# Patient Record
Sex: FEMALE | Race: WHITE | NOT HISPANIC OR LATINO | Employment: UNEMPLOYED | URBAN - METROPOLITAN AREA
[De-identification: names, ages, dates, MRNs, and addresses within clinical notes are randomized per-mention and may not be internally consistent; named-entity substitution may affect disease eponyms.]

---
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
## 2021-03-08 ENCOUNTER — OFFICE VISIT (OUTPATIENT)
Dept: URGENT CARE | Facility: CLINIC | Age: 21
End: 2021-03-08
Payer: MEDICAID

## 2021-03-08 VITALS
HEART RATE: 82 BPM | TEMPERATURE: 97 F | OXYGEN SATURATION: 99 % | RESPIRATION RATE: 16 BRPM | BODY MASS INDEX: 41.11 KG/M2 | DIASTOLIC BLOOD PRESSURE: 82 MMHG | SYSTOLIC BLOOD PRESSURE: 128 MMHG | WEIGHT: 232 LBS | HEIGHT: 63 IN

## 2021-03-08 DIAGNOSIS — M79.671 RIGHT FOOT PAIN: Primary | ICD-10-CM

## 2021-03-08 PROCEDURE — 73630 XR FOOT COMPLETE 3 VIEW RIGHT: ICD-10-PCS | Mod: RT,S$GLB,, | Performed by: RADIOLOGY

## 2021-03-08 PROCEDURE — 99214 PR OFFICE/OUTPT VISIT, EST, LEVL IV, 30-39 MIN: ICD-10-PCS | Mod: S$GLB,,, | Performed by: INTERNAL MEDICINE

## 2021-03-08 PROCEDURE — 73630 X-RAY EXAM OF FOOT: CPT | Mod: RT,S$GLB,, | Performed by: RADIOLOGY

## 2021-03-08 PROCEDURE — 99214 OFFICE O/P EST MOD 30 MIN: CPT | Mod: S$GLB,,, | Performed by: INTERNAL MEDICINE

## 2021-03-08 RX ORDER — DICLOFENAC SODIUM 10 MG/G
2 GEL TOPICAL 3 TIMES DAILY
Qty: 100 G | Refills: 0 | Status: SHIPPED | OUTPATIENT
Start: 2021-03-08 | End: 2021-03-16 | Stop reason: SDUPTHER

## 2021-03-08 RX ORDER — NAPROXEN 500 MG/1
500 TABLET ORAL 2 TIMES DAILY WITH MEALS
Qty: 14 TABLET | Refills: 0 | Status: SHIPPED | OUTPATIENT
Start: 2021-03-08 | End: 2021-03-15

## 2021-03-08 RX ORDER — KETOROLAC TROMETHAMINE 30 MG/ML
30 INJECTION, SOLUTION INTRAMUSCULAR; INTRAVENOUS
Status: COMPLETED | OUTPATIENT
Start: 2021-03-08 | End: 2021-03-08

## 2021-03-08 RX ADMIN — KETOROLAC TROMETHAMINE 30 MG: 30 INJECTION, SOLUTION INTRAMUSCULAR; INTRAVENOUS at 03:03

## 2021-07-03 ENCOUNTER — HOSPITAL ENCOUNTER (OUTPATIENT)
Facility: HOSPITAL | Age: 21
Discharge: HOME | End: 2021-07-04
Attending: EMERGENCY MEDICINE | Admitting: SURGERY
Payer: MEDICAID

## 2021-07-03 DIAGNOSIS — R11.10 VOMITING: ICD-10-CM

## 2021-07-03 DIAGNOSIS — K80.00 CALCULUS OF GALLBLADDER WITH ACUTE CHOLECYSTITIS WITHOUT OBSTRUCTION: Primary | ICD-10-CM

## 2021-07-03 DIAGNOSIS — R10.9 ABDOMINAL PAIN: ICD-10-CM

## 2021-07-03 LAB
ALBUMIN SERPL BCP-MCNC: 4.1 G/DL (ref 3.5–5.2)
ALP SERPL-CCNC: 82 U/L (ref 55–135)
ALT SERPL W/O P-5'-P-CCNC: 34 U/L (ref 10–44)
ANION GAP SERPL CALC-SCNC: 17 MMOL/L (ref 8–16)
AST SERPL-CCNC: 21 U/L (ref 10–40)
B-HCG UR QL: NEGATIVE
BACTERIA #/AREA URNS AUTO: ABNORMAL /HPF
BASOPHILS # BLD AUTO: 0.04 K/UL (ref 0–0.2)
BASOPHILS NFR BLD: 0.3 % (ref 0–1.9)
BILIRUB SERPL-MCNC: 0.4 MG/DL (ref 0.1–1)
BILIRUB UR QL STRIP: NEGATIVE
BUN SERPL-MCNC: 9 MG/DL (ref 6–20)
CALCIUM SERPL-MCNC: 9.6 MG/DL (ref 8.7–10.5)
CHLORIDE SERPL-SCNC: 108 MMOL/L (ref 95–110)
CLARITY UR REFRACT.AUTO: ABNORMAL
CO2 SERPL-SCNC: 15 MMOL/L (ref 23–29)
COLOR UR AUTO: YELLOW
CREAT SERPL-MCNC: 0.8 MG/DL (ref 0.5–1.4)
CTP QC/QA: YES
DIFFERENTIAL METHOD: ABNORMAL
EOSINOPHIL # BLD AUTO: 0.1 K/UL (ref 0–0.5)
EOSINOPHIL NFR BLD: 0.7 % (ref 0–8)
ERYTHROCYTE [DISTWIDTH] IN BLOOD BY AUTOMATED COUNT: 12.2 % (ref 11.5–14.5)
EST. GFR  (AFRICAN AMERICAN): >60 ML/MIN/1.73 M^2
EST. GFR  (NON AFRICAN AMERICAN): >60 ML/MIN/1.73 M^2
GLUCOSE SERPL-MCNC: 146 MG/DL (ref 70–110)
GLUCOSE UR QL STRIP: NEGATIVE
HCT VFR BLD AUTO: 40.2 % (ref 37–48.5)
HGB BLD-MCNC: 13.8 G/DL (ref 12–16)
HGB UR QL STRIP: ABNORMAL
HYALINE CASTS UR QL AUTO: 0 /LPF
IMM GRANULOCYTES # BLD AUTO: 0.09 K/UL (ref 0–0.04)
IMM GRANULOCYTES NFR BLD AUTO: 0.7 % (ref 0–0.5)
KETONES UR QL STRIP: ABNORMAL
LEUKOCYTE ESTERASE UR QL STRIP: ABNORMAL
LIPASE SERPL-CCNC: 13 U/L (ref 4–60)
LYMPHOCYTES # BLD AUTO: 3.7 K/UL (ref 1–4.8)
LYMPHOCYTES NFR BLD: 26.9 % (ref 18–48)
MCH RBC QN AUTO: 29.2 PG (ref 27–31)
MCHC RBC AUTO-ENTMCNC: 34.3 G/DL (ref 32–36)
MCV RBC AUTO: 85 FL (ref 82–98)
MICROSCOPIC COMMENT: ABNORMAL
MONOCYTES # BLD AUTO: 0.5 K/UL (ref 0.3–1)
MONOCYTES NFR BLD: 3.3 % (ref 4–15)
NEUTROPHILS # BLD AUTO: 9.4 K/UL (ref 1.8–7.7)
NEUTROPHILS NFR BLD: 68.1 % (ref 38–73)
NITRITE UR QL STRIP: NEGATIVE
NRBC BLD-RTO: 0 /100 WBC
PH UR STRIP: 6 [PH] (ref 5–8)
PLATELET # BLD AUTO: 283 K/UL (ref 150–450)
PMV BLD AUTO: 10.5 FL (ref 9.2–12.9)
POTASSIUM SERPL-SCNC: 3.6 MMOL/L (ref 3.5–5.1)
PROT SERPL-MCNC: 7.5 G/DL (ref 6–8.4)
PROT UR QL STRIP: ABNORMAL
RBC # BLD AUTO: 4.72 M/UL (ref 4–5.4)
RBC #/AREA URNS AUTO: 2 /HPF (ref 0–4)
SODIUM SERPL-SCNC: 140 MMOL/L (ref 136–145)
SP GR UR STRIP: 1.02 (ref 1–1.03)
SQUAMOUS #/AREA URNS AUTO: 30 /HPF
URN SPEC COLLECT METH UR: ABNORMAL
WBC # BLD AUTO: 13.71 K/UL (ref 3.9–12.7)
WBC #/AREA URNS AUTO: 6 /HPF (ref 0–5)

## 2021-07-03 PROCEDURE — 80053 COMPREHEN METABOLIC PANEL: CPT | Performed by: PHYSICIAN ASSISTANT

## 2021-07-03 PROCEDURE — 25000003 PHARM REV CODE 250: Performed by: PHYSICIAN ASSISTANT

## 2021-07-03 PROCEDURE — 93010 EKG 12-LEAD: ICD-10-PCS | Mod: ,,, | Performed by: INTERNAL MEDICINE

## 2021-07-03 PROCEDURE — 96361 HYDRATE IV INFUSION ADD-ON: CPT

## 2021-07-03 PROCEDURE — 81025 URINE PREGNANCY TEST: CPT | Performed by: PHYSICIAN ASSISTANT

## 2021-07-03 PROCEDURE — 96376 TX/PRO/DX INJ SAME DRUG ADON: CPT

## 2021-07-03 PROCEDURE — 96375 TX/PRO/DX INJ NEW DRUG ADDON: CPT

## 2021-07-03 PROCEDURE — 85025 COMPLETE CBC W/AUTO DIFF WBC: CPT | Performed by: PHYSICIAN ASSISTANT

## 2021-07-03 PROCEDURE — 87389 HIV-1 AG W/HIV-1&-2 AB AG IA: CPT | Performed by: PHYSICIAN ASSISTANT

## 2021-07-03 PROCEDURE — 86803 HEPATITIS C AB TEST: CPT | Performed by: PHYSICIAN ASSISTANT

## 2021-07-03 PROCEDURE — 96372 THER/PROPH/DIAG INJ SC/IM: CPT | Mod: 59

## 2021-07-03 PROCEDURE — 83690 ASSAY OF LIPASE: CPT | Performed by: PHYSICIAN ASSISTANT

## 2021-07-03 PROCEDURE — 99285 EMERGENCY DEPT VISIT HI MDM: CPT | Mod: 25

## 2021-07-03 PROCEDURE — 81001 URINALYSIS AUTO W/SCOPE: CPT | Performed by: PHYSICIAN ASSISTANT

## 2021-07-03 PROCEDURE — 99285 PR EMERGENCY DEPT VISIT,LEVEL V: ICD-10-PCS | Mod: ,,, | Performed by: EMERGENCY MEDICINE

## 2021-07-03 PROCEDURE — 93010 ELECTROCARDIOGRAM REPORT: CPT | Mod: ,,, | Performed by: INTERNAL MEDICINE

## 2021-07-03 PROCEDURE — 25500020 PHARM REV CODE 255: Performed by: PHYSICIAN ASSISTANT

## 2021-07-03 PROCEDURE — 63600175 PHARM REV CODE 636 W HCPCS: Performed by: PHYSICIAN ASSISTANT

## 2021-07-03 PROCEDURE — 99285 EMERGENCY DEPT VISIT HI MDM: CPT | Mod: ,,, | Performed by: EMERGENCY MEDICINE

## 2021-07-03 PROCEDURE — 93005 ELECTROCARDIOGRAM TRACING: CPT

## 2021-07-03 PROCEDURE — 96374 THER/PROPH/DIAG INJ IV PUSH: CPT | Mod: 59

## 2021-07-03 RX ORDER — DROPERIDOL 2.5 MG/ML
0.62 INJECTION, SOLUTION INTRAMUSCULAR; INTRAVENOUS
Status: COMPLETED | OUTPATIENT
Start: 2021-07-03 | End: 2021-07-03

## 2021-07-03 RX ORDER — METOCLOPRAMIDE HYDROCHLORIDE 5 MG/ML
10 INJECTION INTRAMUSCULAR; INTRAVENOUS
Status: COMPLETED | OUTPATIENT
Start: 2021-07-03 | End: 2021-07-03

## 2021-07-03 RX ORDER — DROPERIDOL 2.5 MG/ML
1.25 INJECTION, SOLUTION INTRAMUSCULAR; INTRAVENOUS
Status: DISCONTINUED | OUTPATIENT
Start: 2021-07-03 | End: 2021-07-03

## 2021-07-03 RX ORDER — DICYCLOMINE HYDROCHLORIDE 10 MG/ML
20 INJECTION INTRAMUSCULAR
Status: COMPLETED | OUTPATIENT
Start: 2021-07-03 | End: 2021-07-03

## 2021-07-03 RX ORDER — LORAZEPAM 2 MG/ML
1 INJECTION INTRAMUSCULAR
Status: COMPLETED | OUTPATIENT
Start: 2021-07-03 | End: 2021-07-03

## 2021-07-03 RX ORDER — KETOROLAC TROMETHAMINE 30 MG/ML
15 INJECTION, SOLUTION INTRAMUSCULAR; INTRAVENOUS
Status: COMPLETED | OUTPATIENT
Start: 2021-07-03 | End: 2021-07-03

## 2021-07-03 RX ADMIN — SODIUM CHLORIDE 1000 ML: 0.9 INJECTION, SOLUTION INTRAVENOUS at 01:07

## 2021-07-03 RX ADMIN — KETOROLAC TROMETHAMINE 15 MG: 30 INJECTION, SOLUTION INTRAMUSCULAR; INTRAVENOUS at 04:07

## 2021-07-03 RX ADMIN — DROPERIDOL 0.62 MG: 2.5 INJECTION, SOLUTION INTRAMUSCULAR; INTRAVENOUS at 06:07

## 2021-07-03 RX ADMIN — DROPERIDOL 0.62 MG: 2.5 INJECTION, SOLUTION INTRAMUSCULAR; INTRAVENOUS at 01:07

## 2021-07-03 RX ADMIN — LORAZEPAM 1 MG: 2 INJECTION INTRAMUSCULAR; INTRAVENOUS at 02:07

## 2021-07-03 RX ADMIN — METOCLOPRAMIDE 10 MG: 5 INJECTION, SOLUTION INTRAMUSCULAR; INTRAVENOUS at 09:07

## 2021-07-03 RX ADMIN — SODIUM CHLORIDE 1000 ML: 0.9 INJECTION, SOLUTION INTRAVENOUS at 05:07

## 2021-07-03 RX ADMIN — IOHEXOL 100 ML: 350 INJECTION, SOLUTION INTRAVENOUS at 08:07

## 2021-07-03 RX ADMIN — DICYCLOMINE HYDROCHLORIDE 20 MG: 20 INJECTION, SOLUTION INTRAMUSCULAR at 09:07

## 2021-07-04 ENCOUNTER — ANESTHESIA (OUTPATIENT)
Dept: SURGERY | Facility: HOSPITAL | Age: 21
End: 2021-07-04
Payer: MEDICAID

## 2021-07-04 ENCOUNTER — ANESTHESIA EVENT (OUTPATIENT)
Dept: SURGERY | Facility: HOSPITAL | Age: 21
End: 2021-07-04
Payer: MEDICAID

## 2021-07-04 VITALS
DIASTOLIC BLOOD PRESSURE: 75 MMHG | BODY MASS INDEX: 38.09 KG/M2 | TEMPERATURE: 98 F | SYSTOLIC BLOOD PRESSURE: 130 MMHG | HEIGHT: 63 IN | WEIGHT: 215 LBS | OXYGEN SATURATION: 95 % | HEART RATE: 68 BPM | RESPIRATION RATE: 20 BRPM

## 2021-07-04 PROBLEM — R11.10 VOMITING: Status: ACTIVE | Noted: 2021-07-04

## 2021-07-04 PROBLEM — K81.0 ACUTE CHOLECYSTITIS: Status: ACTIVE | Noted: 2021-07-04

## 2021-07-04 PROBLEM — K80.00 CHOLELITHIASIS WITH ACUTE CHOLECYSTITIS: Status: ACTIVE | Noted: 2021-07-04

## 2021-07-04 LAB
ALBUMIN SERPL BCP-MCNC: 4.1 G/DL (ref 3.5–5.2)
ALP SERPL-CCNC: 77 U/L (ref 55–135)
ALT SERPL W/O P-5'-P-CCNC: 28 U/L (ref 10–44)
ANION GAP SERPL CALC-SCNC: 11 MMOL/L (ref 8–16)
AST SERPL-CCNC: 18 U/L (ref 10–40)
BASOPHILS # BLD AUTO: 0.03 K/UL (ref 0–0.2)
BASOPHILS NFR BLD: 0.2 % (ref 0–1.9)
BILIRUB SERPL-MCNC: 0.5 MG/DL (ref 0.1–1)
BUN SERPL-MCNC: 8 MG/DL (ref 6–20)
CALCIUM SERPL-MCNC: 9.4 MG/DL (ref 8.7–10.5)
CHLORIDE SERPL-SCNC: 106 MMOL/L (ref 95–110)
CO2 SERPL-SCNC: 23 MMOL/L (ref 23–29)
CREAT SERPL-MCNC: 0.6 MG/DL (ref 0.5–1.4)
CTP QC/QA: YES
DIFFERENTIAL METHOD: ABNORMAL
EOSINOPHIL # BLD AUTO: 0 K/UL (ref 0–0.5)
EOSINOPHIL NFR BLD: 0.2 % (ref 0–8)
ERYTHROCYTE [DISTWIDTH] IN BLOOD BY AUTOMATED COUNT: 12.4 % (ref 11.5–14.5)
EST. GFR  (AFRICAN AMERICAN): >60 ML/MIN/1.73 M^2
EST. GFR  (NON AFRICAN AMERICAN): >60 ML/MIN/1.73 M^2
GLUCOSE SERPL-MCNC: 91 MG/DL (ref 70–110)
HCT VFR BLD AUTO: 40.9 % (ref 37–48.5)
HGB BLD-MCNC: 13.2 G/DL (ref 12–16)
IMM GRANULOCYTES # BLD AUTO: 0.06 K/UL (ref 0–0.04)
IMM GRANULOCYTES NFR BLD AUTO: 0.5 % (ref 0–0.5)
LYMPHOCYTES # BLD AUTO: 2.9 K/UL (ref 1–4.8)
LYMPHOCYTES NFR BLD: 24.2 % (ref 18–48)
MAGNESIUM SERPL-MCNC: 2 MG/DL (ref 1.6–2.6)
MCH RBC QN AUTO: 28.4 PG (ref 27–31)
MCHC RBC AUTO-ENTMCNC: 32.3 G/DL (ref 32–36)
MCV RBC AUTO: 88 FL (ref 82–98)
MONOCYTES # BLD AUTO: 0.6 K/UL (ref 0.3–1)
MONOCYTES NFR BLD: 4.7 % (ref 4–15)
NEUTROPHILS # BLD AUTO: 8.5 K/UL (ref 1.8–7.7)
NEUTROPHILS NFR BLD: 70.2 % (ref 38–73)
NRBC BLD-RTO: 0 /100 WBC
PHOSPHATE SERPL-MCNC: 2.3 MG/DL (ref 2.7–4.5)
PLATELET # BLD AUTO: 264 K/UL (ref 150–450)
PMV BLD AUTO: 10.6 FL (ref 9.2–12.9)
POTASSIUM SERPL-SCNC: 3.4 MMOL/L (ref 3.5–5.1)
PROT SERPL-MCNC: 7.6 G/DL (ref 6–8.4)
RBC # BLD AUTO: 4.65 M/UL (ref 4–5.4)
SARS-COV-2 RDRP RESP QL NAA+PROBE: NEGATIVE
SODIUM SERPL-SCNC: 140 MMOL/L (ref 136–145)
WBC # BLD AUTO: 12.13 K/UL (ref 3.9–12.7)

## 2021-07-04 PROCEDURE — 83735 ASSAY OF MAGNESIUM: CPT | Performed by: STUDENT IN AN ORGANIZED HEALTH CARE EDUCATION/TRAINING PROGRAM

## 2021-07-04 PROCEDURE — 88304 TISSUE EXAM BY PATHOLOGIST: CPT | Performed by: PATHOLOGY

## 2021-07-04 PROCEDURE — 25000003 PHARM REV CODE 250: Performed by: STUDENT IN AN ORGANIZED HEALTH CARE EDUCATION/TRAINING PROGRAM

## 2021-07-04 PROCEDURE — 36000708 HC OR TIME LEV III 1ST 15 MIN: Performed by: SURGERY

## 2021-07-04 PROCEDURE — 63600175 PHARM REV CODE 636 W HCPCS: Performed by: STUDENT IN AN ORGANIZED HEALTH CARE EDUCATION/TRAINING PROGRAM

## 2021-07-04 PROCEDURE — 25000003 PHARM REV CODE 250: Performed by: NURSE ANESTHETIST, CERTIFIED REGISTERED

## 2021-07-04 PROCEDURE — 63600175 PHARM REV CODE 636 W HCPCS: Performed by: ANESTHESIOLOGY

## 2021-07-04 PROCEDURE — 36000709 HC OR TIME LEV III EA ADD 15 MIN: Performed by: SURGERY

## 2021-07-04 PROCEDURE — 00790 ANES IPER UPR ABD NOS: CPT | Performed by: SURGERY

## 2021-07-04 PROCEDURE — 47562 PR LAP,CHOLECYSTECTOMY: ICD-10-PCS | Mod: ,,, | Performed by: SURGERY

## 2021-07-04 PROCEDURE — 96376 TX/PRO/DX INJ SAME DRUG ADON: CPT

## 2021-07-04 PROCEDURE — 37000008 HC ANESTHESIA 1ST 15 MINUTES: Performed by: SURGERY

## 2021-07-04 PROCEDURE — D9220A PRA ANESTHESIA: Mod: CRNA,,, | Performed by: NURSE ANESTHETIST, CERTIFIED REGISTERED

## 2021-07-04 PROCEDURE — 99219 PR INITIAL OBSERVATION CARE,LEVL II: ICD-10-PCS | Mod: 57,,, | Performed by: SURGERY

## 2021-07-04 PROCEDURE — 88304 TISSUE EXAM BY PATHOLOGIST: CPT | Mod: 26,,, | Performed by: PATHOLOGY

## 2021-07-04 PROCEDURE — 85025 COMPLETE CBC W/AUTO DIFF WBC: CPT | Performed by: STUDENT IN AN ORGANIZED HEALTH CARE EDUCATION/TRAINING PROGRAM

## 2021-07-04 PROCEDURE — 25000003 PHARM REV CODE 250: Performed by: SURGERY

## 2021-07-04 PROCEDURE — 47562 LAPAROSCOPIC CHOLECYSTECTOMY: CPT | Mod: ,,, | Performed by: SURGERY

## 2021-07-04 PROCEDURE — D9220A PRA ANESTHESIA: ICD-10-PCS | Mod: ANES,,, | Performed by: ANESTHESIOLOGY

## 2021-07-04 PROCEDURE — G0378 HOSPITAL OBSERVATION PER HR: HCPCS

## 2021-07-04 PROCEDURE — 96375 TX/PRO/DX INJ NEW DRUG ADDON: CPT

## 2021-07-04 PROCEDURE — 80053 COMPREHEN METABOLIC PANEL: CPT | Performed by: STUDENT IN AN ORGANIZED HEALTH CARE EDUCATION/TRAINING PROGRAM

## 2021-07-04 PROCEDURE — 36415 COLL VENOUS BLD VENIPUNCTURE: CPT | Performed by: STUDENT IN AN ORGANIZED HEALTH CARE EDUCATION/TRAINING PROGRAM

## 2021-07-04 PROCEDURE — U0002 COVID-19 LAB TEST NON-CDC: HCPCS | Performed by: PHYSICIAN ASSISTANT

## 2021-07-04 PROCEDURE — 96375 TX/PRO/DX INJ NEW DRUG ADDON: CPT | Mod: 59

## 2021-07-04 PROCEDURE — 88304 PR  SURG PATH,LEVEL III: ICD-10-PCS | Mod: 26,,, | Performed by: PATHOLOGY

## 2021-07-04 PROCEDURE — 27201423 OPTIME MED/SURG SUP & DEVICES STERILE SUPPLY: Performed by: SURGERY

## 2021-07-04 PROCEDURE — 99219 PR INITIAL OBSERVATION CARE,LEVL II: CPT | Mod: 57,,, | Performed by: SURGERY

## 2021-07-04 PROCEDURE — 71000033 HC RECOVERY, INTIAL HOUR: Performed by: SURGERY

## 2021-07-04 PROCEDURE — D9220A PRA ANESTHESIA: ICD-10-PCS | Mod: CRNA,,, | Performed by: NURSE ANESTHETIST, CERTIFIED REGISTERED

## 2021-07-04 PROCEDURE — 37000009 HC ANESTHESIA EA ADD 15 MINS: Performed by: SURGERY

## 2021-07-04 PROCEDURE — 63600175 PHARM REV CODE 636 W HCPCS: Performed by: NURSE ANESTHETIST, CERTIFIED REGISTERED

## 2021-07-04 PROCEDURE — D9220A PRA ANESTHESIA: Mod: ANES,,, | Performed by: ANESTHESIOLOGY

## 2021-07-04 PROCEDURE — 63600175 PHARM REV CODE 636 W HCPCS: Performed by: PHYSICIAN ASSISTANT

## 2021-07-04 PROCEDURE — 25000242 PHARM REV CODE 250 ALT 637 W/ HCPCS: Performed by: NURSE ANESTHETIST, CERTIFIED REGISTERED

## 2021-07-04 PROCEDURE — 84100 ASSAY OF PHOSPHORUS: CPT | Performed by: STUDENT IN AN ORGANIZED HEALTH CARE EDUCATION/TRAINING PROGRAM

## 2021-07-04 RX ORDER — ALBUTEROL SULFATE 90 UG/1
AEROSOL, METERED RESPIRATORY (INHALATION)
Status: DISCONTINUED | OUTPATIENT
Start: 2021-07-04 | End: 2021-07-04

## 2021-07-04 RX ORDER — BUPIVACAINE HYDROCHLORIDE 5 MG/ML
INJECTION, SOLUTION EPIDURAL; INTRACAUDAL
Status: DISCONTINUED | OUTPATIENT
Start: 2021-07-04 | End: 2021-07-04 | Stop reason: HOSPADM

## 2021-07-04 RX ORDER — MIDAZOLAM HYDROCHLORIDE 1 MG/ML
INJECTION, SOLUTION INTRAMUSCULAR; INTRAVENOUS
Status: DISCONTINUED | OUTPATIENT
Start: 2021-07-04 | End: 2021-07-04

## 2021-07-04 RX ORDER — LIDOCAINE HYDROCHLORIDE 20 MG/ML
INJECTION, SOLUTION EPIDURAL; INFILTRATION; INTRACAUDAL; PERINEURAL
Status: DISCONTINUED | OUTPATIENT
Start: 2021-07-04 | End: 2021-07-04

## 2021-07-04 RX ORDER — ROCURONIUM BROMIDE 10 MG/ML
INJECTION, SOLUTION INTRAVENOUS
Status: DISCONTINUED | OUTPATIENT
Start: 2021-07-04 | End: 2021-07-04

## 2021-07-04 RX ORDER — DEXMEDETOMIDINE HYDROCHLORIDE 100 UG/ML
INJECTION, SOLUTION INTRAVENOUS
Status: DISCONTINUED | OUTPATIENT
Start: 2021-07-04 | End: 2021-07-04

## 2021-07-04 RX ORDER — ONDANSETRON 2 MG/ML
INJECTION INTRAMUSCULAR; INTRAVENOUS
Status: DISCONTINUED | OUTPATIENT
Start: 2021-07-04 | End: 2021-07-04

## 2021-07-04 RX ORDER — SODIUM CHLORIDE 0.9 % (FLUSH) 0.9 %
10 SYRINGE (ML) INJECTION
Status: DISCONTINUED | OUTPATIENT
Start: 2021-07-04 | End: 2021-07-04 | Stop reason: HOSPADM

## 2021-07-04 RX ORDER — ONDANSETRON 4 MG/1
4 TABLET, FILM COATED ORAL EVERY 8 HOURS PRN
Qty: 10 TABLET | Refills: 0 | Status: SHIPPED | OUTPATIENT
Start: 2021-07-04 | End: 2021-08-19 | Stop reason: SDUPTHER

## 2021-07-04 RX ORDER — CEFAZOLIN SODIUM 1 G/3ML
INJECTION, POWDER, FOR SOLUTION INTRAMUSCULAR; INTRAVENOUS
Status: DISCONTINUED | OUTPATIENT
Start: 2021-07-04 | End: 2021-07-04

## 2021-07-04 RX ORDER — ONDANSETRON 8 MG/1
8 TABLET, ORALLY DISINTEGRATING ORAL EVERY 8 HOURS PRN
Status: DISCONTINUED | OUTPATIENT
Start: 2021-07-04 | End: 2021-07-04 | Stop reason: HOSPADM

## 2021-07-04 RX ORDER — PROPOFOL 10 MG/ML
VIAL (ML) INTRAVENOUS
Status: DISCONTINUED | OUTPATIENT
Start: 2021-07-04 | End: 2021-07-04

## 2021-07-04 RX ORDER — ACETAMINOPHEN 10 MG/ML
INJECTION, SOLUTION INTRAVENOUS
Status: DISCONTINUED | OUTPATIENT
Start: 2021-07-04 | End: 2021-07-04

## 2021-07-04 RX ORDER — MORPHINE SULFATE 4 MG/ML
4 INJECTION, SOLUTION INTRAMUSCULAR; INTRAVENOUS
Status: COMPLETED | OUTPATIENT
Start: 2021-07-04 | End: 2021-07-04

## 2021-07-04 RX ORDER — ENOXAPARIN SODIUM 100 MG/ML
40 INJECTION SUBCUTANEOUS EVERY 24 HOURS
Status: DISCONTINUED | OUTPATIENT
Start: 2021-07-04 | End: 2021-07-04 | Stop reason: HOSPADM

## 2021-07-04 RX ORDER — HYDROMORPHONE HYDROCHLORIDE 1 MG/ML
0.2 INJECTION, SOLUTION INTRAMUSCULAR; INTRAVENOUS; SUBCUTANEOUS EVERY 5 MIN PRN
Status: DISCONTINUED | OUTPATIENT
Start: 2021-07-04 | End: 2021-07-04 | Stop reason: HOSPADM

## 2021-07-04 RX ORDER — SODIUM CHLORIDE 0.9 % (FLUSH) 0.9 %
3 SYRINGE (ML) INJECTION
Status: DISCONTINUED | OUTPATIENT
Start: 2021-07-04 | End: 2021-07-04 | Stop reason: HOSPADM

## 2021-07-04 RX ORDER — ONDANSETRON 2 MG/ML
4 INJECTION INTRAMUSCULAR; INTRAVENOUS ONCE AS NEEDED
Status: DISCONTINUED | OUTPATIENT
Start: 2021-07-04 | End: 2021-07-04 | Stop reason: HOSPADM

## 2021-07-04 RX ORDER — OXYCODONE AND ACETAMINOPHEN 5; 325 MG/1; MG/1
1 TABLET ORAL EVERY 4 HOURS PRN
Status: DISCONTINUED | OUTPATIENT
Start: 2021-07-04 | End: 2021-07-04 | Stop reason: HOSPADM

## 2021-07-04 RX ORDER — FENTANYL CITRATE 50 UG/ML
25 INJECTION, SOLUTION INTRAMUSCULAR; INTRAVENOUS EVERY 5 MIN PRN
Status: COMPLETED | OUTPATIENT
Start: 2021-07-04 | End: 2021-07-04

## 2021-07-04 RX ORDER — OXYCODONE AND ACETAMINOPHEN 5; 325 MG/1; MG/1
1 TABLET ORAL EVERY 4 HOURS PRN
Qty: 25 TABLET | Refills: 0 | Status: SHIPPED | OUTPATIENT
Start: 2021-07-04 | End: 2021-07-19

## 2021-07-04 RX ORDER — HALOPERIDOL 5 MG/ML
0.5 INJECTION INTRAMUSCULAR EVERY 10 MIN PRN
Status: DISCONTINUED | OUTPATIENT
Start: 2021-07-04 | End: 2021-07-04 | Stop reason: HOSPADM

## 2021-07-04 RX ORDER — OXYCODONE AND ACETAMINOPHEN 10; 325 MG/1; MG/1
1 TABLET ORAL EVERY 4 HOURS PRN
Status: DISCONTINUED | OUTPATIENT
Start: 2021-07-04 | End: 2021-07-04 | Stop reason: HOSPADM

## 2021-07-04 RX ORDER — DEXAMETHASONE SODIUM PHOSPHATE 4 MG/ML
INJECTION, SOLUTION INTRA-ARTICULAR; INTRALESIONAL; INTRAMUSCULAR; INTRAVENOUS; SOFT TISSUE
Status: DISCONTINUED | OUTPATIENT
Start: 2021-07-04 | End: 2021-07-04

## 2021-07-04 RX ORDER — LIDOCAINE HYDROCHLORIDE 10 MG/ML
1 INJECTION, SOLUTION EPIDURAL; INFILTRATION; INTRACAUDAL; PERINEURAL ONCE
Status: DISCONTINUED | OUTPATIENT
Start: 2021-07-04 | End: 2021-07-04 | Stop reason: HOSPADM

## 2021-07-04 RX ORDER — HYDROMORPHONE HYDROCHLORIDE 1 MG/ML
0.5 INJECTION, SOLUTION INTRAMUSCULAR; INTRAVENOUS; SUBCUTANEOUS EVERY 4 HOURS PRN
Status: DISCONTINUED | OUTPATIENT
Start: 2021-07-04 | End: 2021-07-04

## 2021-07-04 RX ORDER — TALC
6 POWDER (GRAM) TOPICAL NIGHTLY PRN
Status: DISCONTINUED | OUTPATIENT
Start: 2021-07-04 | End: 2021-07-04

## 2021-07-04 RX ORDER — ACETAMINOPHEN 325 MG/1
650 TABLET ORAL EVERY 8 HOURS PRN
Status: DISCONTINUED | OUTPATIENT
Start: 2021-07-04 | End: 2021-07-04 | Stop reason: HOSPADM

## 2021-07-04 RX ORDER — FENTANYL CITRATE 50 UG/ML
INJECTION, SOLUTION INTRAMUSCULAR; INTRAVENOUS
Status: DISCONTINUED | OUTPATIENT
Start: 2021-07-04 | End: 2021-07-04

## 2021-07-04 RX ORDER — OXYCODONE AND ACETAMINOPHEN 5; 325 MG/1; MG/1
1 TABLET ORAL EVERY 4 HOURS PRN
Qty: 25 TABLET | Refills: 0 | Status: SHIPPED | OUTPATIENT
Start: 2021-07-04 | End: 2021-07-04 | Stop reason: SDUPTHER

## 2021-07-04 RX ORDER — SODIUM CHLORIDE, SODIUM LACTATE, POTASSIUM CHLORIDE, CALCIUM CHLORIDE 600; 310; 30; 20 MG/100ML; MG/100ML; MG/100ML; MG/100ML
INJECTION, SOLUTION INTRAVENOUS CONTINUOUS
Status: DISCONTINUED | OUTPATIENT
Start: 2021-07-04 | End: 2021-07-04

## 2021-07-04 RX ORDER — DIPHENHYDRAMINE HYDROCHLORIDE 50 MG/ML
25 INJECTION INTRAMUSCULAR; INTRAVENOUS EVERY 6 HOURS PRN
Status: DISCONTINUED | OUTPATIENT
Start: 2021-07-04 | End: 2021-07-04 | Stop reason: HOSPADM

## 2021-07-04 RX ADMIN — DEXMEDETOMIDINE HYDROCHLORIDE 8 MCG: 100 INJECTION, SOLUTION INTRAVENOUS at 12:07

## 2021-07-04 RX ADMIN — GLYCOPYRROLATE 0.1 MG: 0.2 INJECTION, SOLUTION INTRAMUSCULAR; INTRAVITREAL at 12:07

## 2021-07-04 RX ADMIN — SUGAMMADEX 200 MG: 100 INJECTION, SOLUTION INTRAVENOUS at 12:07

## 2021-07-04 RX ADMIN — MORPHINE SULFATE 4 MG: 4 INJECTION INTRAVENOUS at 12:07

## 2021-07-04 RX ADMIN — ONDANSETRON 4 MG: 2 INJECTION INTRAMUSCULAR; INTRAVENOUS at 12:07

## 2021-07-04 RX ADMIN — FENTANYL CITRATE 25 MCG: 50 INJECTION, SOLUTION INTRAMUSCULAR; INTRAVENOUS at 01:07

## 2021-07-04 RX ADMIN — OXYCODONE AND ACETAMINOPHEN 1 TABLET: 10; 325 TABLET ORAL at 12:07

## 2021-07-04 RX ADMIN — LIDOCAINE HYDROCHLORIDE 100 MG: 20 INJECTION, SOLUTION EPIDURAL; INFILTRATION; INTRACAUDAL at 11:07

## 2021-07-04 RX ADMIN — ROCURONIUM BROMIDE 40 MG: 10 INJECTION INTRAVENOUS at 11:07

## 2021-07-04 RX ADMIN — HYDROMORPHONE HYDROCHLORIDE 0.5 MG: 1 INJECTION, SOLUTION INTRAMUSCULAR; INTRAVENOUS; SUBCUTANEOUS at 09:07

## 2021-07-04 RX ADMIN — ONDANSETRON 8 MG: 8 TABLET, ORALLY DISINTEGRATING ORAL at 05:07

## 2021-07-04 RX ADMIN — FENTANYL CITRATE 25 MCG: 50 INJECTION, SOLUTION INTRAMUSCULAR; INTRAVENOUS at 12:07

## 2021-07-04 RX ADMIN — MIDAZOLAM 2 MG: 1 INJECTION INTRAMUSCULAR; INTRAVENOUS at 11:07

## 2021-07-04 RX ADMIN — PROPOFOL 200 MG: 10 INJECTION, EMULSION INTRAVENOUS at 11:07

## 2021-07-04 RX ADMIN — FENTANYL CITRATE 25 MCG: 50 INJECTION INTRAMUSCULAR; INTRAVENOUS at 12:07

## 2021-07-04 RX ADMIN — FENTANYL CITRATE 50 MCG: 50 INJECTION INTRAMUSCULAR; INTRAVENOUS at 11:07

## 2021-07-04 RX ADMIN — OXYCODONE AND ACETAMINOPHEN 1 TABLET: 10; 325 TABLET ORAL at 05:07

## 2021-07-04 RX ADMIN — ALBUTEROL SULFATE 4 PUFF: 108 INHALANT RESPIRATORY (INHALATION) at 12:07

## 2021-07-04 RX ADMIN — HYDROMORPHONE HYDROCHLORIDE 0.5 MG: 1 INJECTION, SOLUTION INTRAMUSCULAR; INTRAVENOUS; SUBCUTANEOUS at 05:07

## 2021-07-04 RX ADMIN — SODIUM CHLORIDE: 9 INJECTION, SOLUTION INTRAVENOUS at 11:07

## 2021-07-04 RX ADMIN — DEXAMETHASONE SODIUM PHOSPHATE 4 MG: 4 INJECTION INTRA-ARTICULAR; INTRALESIONAL; INTRAMUSCULAR; INTRAVENOUS; SOFT TISSUE at 12:07

## 2021-07-04 RX ADMIN — SODIUM CHLORIDE, SODIUM LACTATE, POTASSIUM CHLORIDE, AND CALCIUM CHLORIDE: .6; .31; .03; .02 INJECTION, SOLUTION INTRAVENOUS at 05:07

## 2021-07-04 RX ADMIN — CEFAZOLIN 2 G: 330 INJECTION, POWDER, FOR SOLUTION INTRAMUSCULAR; INTRAVENOUS at 12:07

## 2021-07-04 RX ADMIN — ACETAMINOPHEN 1000 MG: 10 INJECTION INTRAVENOUS at 12:07

## 2021-07-05 ENCOUNTER — NURSE TRIAGE (OUTPATIENT)
Dept: ADMINISTRATIVE | Facility: CLINIC | Age: 21
End: 2021-07-05

## 2021-07-05 LAB
HCV AB SERPL QL IA: NEGATIVE
HIV 1+2 AB+HIV1 P24 AG SERPL QL IA: NEGATIVE

## 2021-07-09 ENCOUNTER — TELEPHONE (OUTPATIENT)
Dept: SURGERY | Facility: CLINIC | Age: 21
End: 2021-07-09

## 2021-07-12 LAB
FINAL PATHOLOGIC DIAGNOSIS: NORMAL
Lab: NORMAL

## 2021-07-19 ENCOUNTER — OFFICE VISIT (OUTPATIENT)
Dept: SURGERY | Facility: CLINIC | Age: 21
End: 2021-07-19
Payer: MEDICAID

## 2021-07-19 VITALS
TEMPERATURE: 99 F | OXYGEN SATURATION: 99 % | WEIGHT: 221.13 LBS | BODY MASS INDEX: 39.18 KG/M2 | SYSTOLIC BLOOD PRESSURE: 132 MMHG | DIASTOLIC BLOOD PRESSURE: 78 MMHG | HEIGHT: 63 IN | HEART RATE: 115 BPM

## 2021-07-19 DIAGNOSIS — K80.00 CALCULUS OF GALLBLADDER WITH ACUTE CHOLECYSTITIS WITHOUT OBSTRUCTION: Primary | ICD-10-CM

## 2021-07-19 PROCEDURE — 99999 PR PBB SHADOW E&M-EST. PATIENT-LVL IV: ICD-10-PCS | Mod: PBBFAC,,, | Performed by: SURGERY

## 2021-07-19 PROCEDURE — 99024 PR POST-OP FOLLOW-UP VISIT: ICD-10-PCS | Mod: ,,, | Performed by: SURGERY

## 2021-07-19 PROCEDURE — 99999 PR PBB SHADOW E&M-EST. PATIENT-LVL IV: CPT | Mod: PBBFAC,,, | Performed by: SURGERY

## 2021-07-19 PROCEDURE — 99214 OFFICE O/P EST MOD 30 MIN: CPT | Mod: PBBFAC | Performed by: SURGERY

## 2021-07-19 PROCEDURE — 99024 POSTOP FOLLOW-UP VISIT: CPT | Mod: ,,, | Performed by: SURGERY

## 2021-07-19 RX ORDER — OXYCODONE AND ACETAMINOPHEN 5; 325 MG/1; MG/1
1 TABLET ORAL EVERY 4 HOURS PRN
Qty: 10 TABLET | Refills: 0 | Status: SHIPPED | OUTPATIENT
Start: 2021-07-19 | End: 2022-09-06

## 2021-07-26 ENCOUNTER — OFFICE VISIT (OUTPATIENT)
Dept: SURGERY | Facility: CLINIC | Age: 21
End: 2021-07-26
Payer: MEDICAID

## 2021-07-26 ENCOUNTER — TELEPHONE (OUTPATIENT)
Dept: SURGERY | Facility: CLINIC | Age: 21
End: 2021-07-26

## 2021-07-26 VITALS
HEIGHT: 63 IN | DIASTOLIC BLOOD PRESSURE: 86 MMHG | BODY MASS INDEX: 39.02 KG/M2 | OXYGEN SATURATION: 96 % | SYSTOLIC BLOOD PRESSURE: 125 MMHG | HEART RATE: 115 BPM | TEMPERATURE: 99 F | WEIGHT: 220.25 LBS

## 2021-07-26 DIAGNOSIS — K80.01 CALCULUS OF GALLBLADDER WITH ACUTE CHOLECYSTITIS AND OBSTRUCTION: Primary | ICD-10-CM

## 2021-07-26 PROCEDURE — 99214 OFFICE O/P EST MOD 30 MIN: CPT | Mod: PBBFAC | Performed by: SURGERY

## 2021-07-26 PROCEDURE — 99999 PR PBB SHADOW E&M-EST. PATIENT-LVL IV: ICD-10-PCS | Mod: PBBFAC,,, | Performed by: SURGERY

## 2021-07-26 PROCEDURE — 99999 PR PBB SHADOW E&M-EST. PATIENT-LVL IV: CPT | Mod: PBBFAC,,, | Performed by: SURGERY

## 2021-07-26 PROCEDURE — 99024 POSTOP FOLLOW-UP VISIT: CPT | Mod: ,,, | Performed by: SURGERY

## 2021-07-26 PROCEDURE — 99024 PR POST-OP FOLLOW-UP VISIT: ICD-10-PCS | Mod: ,,, | Performed by: SURGERY

## 2021-07-26 RX ORDER — CYCLOBENZAPRINE HCL 5 MG
5 TABLET ORAL 3 TIMES DAILY PRN
Qty: 30 TABLET | Refills: 0 | Status: SHIPPED | OUTPATIENT
Start: 2021-07-26 | End: 2021-08-19 | Stop reason: SDUPTHER

## 2022-04-29 ENCOUNTER — HOSPITAL ENCOUNTER (EMERGENCY)
Facility: HOSPITAL | Age: 22
Discharge: HOME | End: 2022-04-29
Attending: EMERGENCY MEDICINE
Payer: MEDICAID

## 2022-04-29 VITALS
TEMPERATURE: 98 F | HEIGHT: 63 IN | OXYGEN SATURATION: 98 % | SYSTOLIC BLOOD PRESSURE: 128 MMHG | RESPIRATION RATE: 20 BRPM | HEART RATE: 100 BPM | WEIGHT: 250 LBS | BODY MASS INDEX: 44.3 KG/M2 | DIASTOLIC BLOOD PRESSURE: 81 MMHG

## 2022-04-29 DIAGNOSIS — R19.7 BLOODY DIARRHEA: Primary | ICD-10-CM

## 2022-04-29 DIAGNOSIS — R00.0 TACHYCARDIA: ICD-10-CM

## 2022-04-29 LAB
ALBUMIN SERPL BCP-MCNC: 4.3 G/DL (ref 3.5–5.2)
ALP SERPL-CCNC: 77 U/L (ref 55–135)
ALT SERPL W/O P-5'-P-CCNC: 50 U/L (ref 10–44)
ANION GAP SERPL CALC-SCNC: 13 MMOL/L (ref 8–16)
AST SERPL-CCNC: 29 U/L (ref 10–40)
B-HCG UR QL: NEGATIVE
BASOPHILS # BLD AUTO: 0.04 K/UL (ref 0–0.2)
BASOPHILS NFR BLD: 0.3 % (ref 0–1.9)
BILIRUB SERPL-MCNC: 0.5 MG/DL (ref 0.1–1)
BUN SERPL-MCNC: 10 MG/DL (ref 6–20)
BUN SERPL-MCNC: 10 MG/DL (ref 6–30)
CALCIUM SERPL-MCNC: 9.7 MG/DL (ref 8.7–10.5)
CHLORIDE SERPL-SCNC: 104 MMOL/L (ref 95–110)
CHLORIDE SERPL-SCNC: 104 MMOL/L (ref 95–110)
CO2 SERPL-SCNC: 24 MMOL/L (ref 23–29)
CREAT SERPL-MCNC: 0.6 MG/DL (ref 0.5–1.4)
CREAT SERPL-MCNC: 0.7 MG/DL (ref 0.5–1.4)
CTP QC/QA: YES
DIFFERENTIAL METHOD: ABNORMAL
EOSINOPHIL # BLD AUTO: 0.2 K/UL (ref 0–0.5)
EOSINOPHIL NFR BLD: 1.9 % (ref 0–8)
ERYTHROCYTE [DISTWIDTH] IN BLOOD BY AUTOMATED COUNT: 12.1 % (ref 11.5–14.5)
EST. GFR  (AFRICAN AMERICAN): >60 ML/MIN/1.73 M^2
EST. GFR  (NON AFRICAN AMERICAN): >60 ML/MIN/1.73 M^2
GLUCOSE SERPL-MCNC: 87 MG/DL (ref 70–110)
GLUCOSE SERPL-MCNC: 91 MG/DL (ref 70–110)
HCT VFR BLD AUTO: 42.1 % (ref 37–48.5)
HCT VFR BLD CALC: 43 %PCV (ref 36–54)
HGB BLD-MCNC: 14.2 G/DL (ref 12–16)
IMM GRANULOCYTES # BLD AUTO: 0.03 K/UL (ref 0–0.04)
IMM GRANULOCYTES NFR BLD AUTO: 0.2 % (ref 0–0.5)
LIPASE SERPL-CCNC: 15 U/L (ref 4–60)
LYMPHOCYTES # BLD AUTO: 5.1 K/UL (ref 1–4.8)
LYMPHOCYTES NFR BLD: 41.1 % (ref 18–48)
MCH RBC QN AUTO: 29.8 PG (ref 27–31)
MCHC RBC AUTO-ENTMCNC: 33.7 G/DL (ref 32–36)
MCV RBC AUTO: 88 FL (ref 82–98)
MONOCYTES # BLD AUTO: 0.6 K/UL (ref 0.3–1)
MONOCYTES NFR BLD: 4.8 % (ref 4–15)
NEUTROPHILS # BLD AUTO: 6.4 K/UL (ref 1.8–7.7)
NEUTROPHILS NFR BLD: 51.7 % (ref 38–73)
NRBC BLD-RTO: 0 /100 WBC
PLATELET # BLD AUTO: 285 K/UL (ref 150–450)
PMV BLD AUTO: 10.8 FL (ref 9.2–12.9)
POC IONIZED CALCIUM: 1.14 MMOL/L (ref 1.06–1.42)
POC TCO2 (MEASURED): 24 MMOL/L (ref 23–29)
POTASSIUM BLD-SCNC: 3.9 MMOL/L (ref 3.5–5.1)
POTASSIUM SERPL-SCNC: 3.9 MMOL/L (ref 3.5–5.1)
PROT SERPL-MCNC: 7.8 G/DL (ref 6–8.4)
RBC # BLD AUTO: 4.76 M/UL (ref 4–5.4)
SAMPLE: NORMAL
SODIUM BLD-SCNC: 140 MMOL/L (ref 136–145)
SODIUM SERPL-SCNC: 141 MMOL/L (ref 136–145)
WBC # BLD AUTO: 12.33 K/UL (ref 3.9–12.7)

## 2022-04-29 PROCEDURE — 93005 ELECTROCARDIOGRAM TRACING: CPT

## 2022-04-29 PROCEDURE — 99284 EMERGENCY DEPT VISIT MOD MDM: CPT | Mod: 25

## 2022-04-29 PROCEDURE — 83690 ASSAY OF LIPASE: CPT | Performed by: EMERGENCY MEDICINE

## 2022-04-29 PROCEDURE — 96361 HYDRATE IV INFUSION ADD-ON: CPT

## 2022-04-29 PROCEDURE — 63600175 PHARM REV CODE 636 W HCPCS: Performed by: EMERGENCY MEDICINE

## 2022-04-29 PROCEDURE — 93010 EKG 12-LEAD: ICD-10-PCS | Mod: ,,, | Performed by: INTERNAL MEDICINE

## 2022-04-29 PROCEDURE — 85025 COMPLETE CBC W/AUTO DIFF WBC: CPT | Performed by: EMERGENCY MEDICINE

## 2022-04-29 PROCEDURE — 99284 PR EMERGENCY DEPT VISIT,LEVEL IV: ICD-10-PCS | Mod: ,,, | Performed by: EMERGENCY MEDICINE

## 2022-04-29 PROCEDURE — 96374 THER/PROPH/DIAG INJ IV PUSH: CPT

## 2022-04-29 PROCEDURE — 99284 EMERGENCY DEPT VISIT MOD MDM: CPT | Mod: ,,, | Performed by: EMERGENCY MEDICINE

## 2022-04-29 PROCEDURE — 81025 URINE PREGNANCY TEST: CPT | Performed by: EMERGENCY MEDICINE

## 2022-04-29 PROCEDURE — 25000003 PHARM REV CODE 250: Performed by: EMERGENCY MEDICINE

## 2022-04-29 PROCEDURE — 80053 COMPREHEN METABOLIC PANEL: CPT | Performed by: EMERGENCY MEDICINE

## 2022-04-29 PROCEDURE — 93010 ELECTROCARDIOGRAM REPORT: CPT | Mod: ,,, | Performed by: INTERNAL MEDICINE

## 2022-04-29 RX ORDER — DICYCLOMINE HYDROCHLORIDE 20 MG/1
20 TABLET ORAL 2 TIMES DAILY PRN
Qty: 15 TABLET | Refills: 0 | Status: SHIPPED | OUTPATIENT
Start: 2022-04-29 | End: 2022-05-29

## 2022-04-29 RX ORDER — CIPROFLOXACIN 500 MG/1
500 TABLET ORAL
Status: COMPLETED | OUTPATIENT
Start: 2022-04-29 | End: 2022-04-29

## 2022-04-29 RX ORDER — METRONIDAZOLE 500 MG/1
500 TABLET ORAL EVERY 8 HOURS
Status: DISCONTINUED | OUTPATIENT
Start: 2022-04-29 | End: 2022-04-29

## 2022-04-29 RX ORDER — ONDANSETRON 2 MG/ML
4 INJECTION INTRAMUSCULAR; INTRAVENOUS
Status: COMPLETED | OUTPATIENT
Start: 2022-04-29 | End: 2022-04-29

## 2022-04-29 RX ORDER — METRONIDAZOLE 500 MG/1
500 TABLET ORAL EVERY 12 HOURS
Qty: 14 TABLET | Refills: 0 | Status: SHIPPED | OUTPATIENT
Start: 2022-04-29 | End: 2022-05-06

## 2022-04-29 RX ORDER — METRONIDAZOLE 500 MG/1
500 TABLET ORAL
Status: COMPLETED | OUTPATIENT
Start: 2022-04-29 | End: 2022-04-29

## 2022-04-29 RX ORDER — CIPROFLOXACIN 500 MG/1
500 TABLET ORAL 2 TIMES DAILY
Qty: 14 TABLET | Refills: 0 | Status: SHIPPED | OUTPATIENT
Start: 2022-04-29 | End: 2022-05-06

## 2022-04-29 RX ADMIN — CIPROFLOXACIN 500 MG: 500 TABLET, FILM COATED ORAL at 02:04

## 2022-04-29 RX ADMIN — METRONIDAZOLE 500 MG: 500 TABLET ORAL at 03:04

## 2022-04-29 RX ADMIN — ONDANSETRON 4 MG: 2 INJECTION INTRAMUSCULAR; INTRAVENOUS at 03:04

## 2022-04-29 RX ADMIN — SODIUM CHLORIDE 1000 ML: 0.9 INJECTION, SOLUTION INTRAVENOUS at 01:04

## 2022-04-29 NOTE — DISCHARGE INSTRUCTIONS
Your labs, physical exam did not show any signs of immediately dangerous medical conditions.    It is possible that the blood in the stool is from inflammation and / or bacterial infection.    You were given a course of antibiotics for the next 7 days to treat possible bacterial infection.    If you develop significantly worsening bleeding, dizziness, weakness, trouble breathing, palpitations, stronger worsening abdominal pain, vomiting blood, or any other new, worsening worrisome symptoms please return to the emergency department for re-evaluation.    Please follow-up with your primary care doctor within 1 week for continued management of your symptoms.    Your also referred to the GI doctors as you may need a follow-up exam and or colonoscopy for the bleeding.    At home you can take Tylenol and/or Bentyl for symptom control.  You can take your home Zofran and/or Phenergan as needed for nausea control.

## 2022-04-29 NOTE — ED TRIAGE NOTES
"Davida Parsons, an 21 y.o. female presents to the ED from work with boyfriend.  C/o three episodes of diarrhea/bright red blood at work between 1800 and 0000 last night.  The third episode produced "clots".        Chief Complaint   Patient presents with    Rectal Bleeding     States 3 episodes of bright red rectal bleeding in diarrhea, states lower abdominal cramping that feels like period cramps. Denies weakness, dizziness.      Review of patient's allergies indicates:   Allergen Reactions    Latex, natural rubber Rash    Sulfa (sulfonamide antibiotics) Hives     Past Medical History:   Diagnosis Date    Mental disorder     anxiety and depression    Ovarian cyst     Trauma     patient states she has both desceased parents and suffers from depression and anxiety     "

## 2022-04-29 NOTE — ED PROVIDER NOTES
Encounter Date: 4/29/2022       History     Chief Complaint   Patient presents with    Rectal Bleeding     States 3 episodes of bright red rectal bleeding in diarrhea, states lower abdominal cramping that feels like period cramps. Denies weakness, dizziness.      HPI   21-year-old female with past medical history of PTSD, cholecystectomy, hypothyroidism, coming in with bloody stool that started this evening.  This morning she had an episode of nonbloody diarrhea with some mild abdominal cramping throughout the day.  She subsequently developed blood mixed with stool tonight.  She had 2 more bloody bowel movements 1 was just blood and then some small blood clots after that.  Continued with some some minor abdominal cramping.  No fevers, some nausea but states she has baseline nausea with her PTSD.  No vomiting.  No previous similar symptoms.  Not on blood thinners.  No rash.  No sick contacts with vomiting or diarrhea, no travel.  She does work at [employer] and did have [employer] for lunch.  There is no rectal pain while defecating but there is some rectal soreness after fact.    Review of patient's allergies indicates:   Allergen Reactions    Latex, natural rubber Rash    Sulfa (sulfonamide antibiotics) Hives     Past Medical History:   Diagnosis Date    Mental disorder     anxiety and depression    Ovarian cyst     Trauma     patient states she has both desceased parents and suffers from depression and anxiety     Past Surgical History:   Procedure Laterality Date    LAPAROSCOPIC CHOLECYSTECTOMY N/A 7/4/2021    Procedure: CHOLECYSTECTOMY, LAPAROSCOPIC;  Surgeon: Mauricio An MD;  Location: [facility] OR [address] [place] [address];  Service: General;  Laterality: N/A;    TONSILLECTOMY       Family History   Problem Relation Age of Onset    Cancer Father         skin    Hypertension Maternal Grandmother     Diabetes Paternal Grandfather     Amblyopia Neg Hx     Blindness Neg Hx     Cataracts Neg Hx     Glaucoma Neg Hx      Macular degeneration Neg Hx     Retinal detachment Neg Hx      Social History     Tobacco Use    Smoking status: Current Every Day Smoker     Packs/day: 0.50     Types: Cigarettes    Smokeless tobacco: Current User   Substance Use Topics    Alcohol use: Yes     Alcohol/week: 0.0 standard drinks     Comment: occationally    Drug use: Yes     Types: Marijuana     Comment: Ectasy     Review of Systems    Constitutional:  No Fever, No Chills,   Eyes: No Vision Changes  ENT/Mouth: No sore throat, No rhinorrhea  Cardiovascular:  No Chest Pain, No Palpitations  Respiratory:  No Cough, No SOB  Gastrointestinal:  No Nausea, No Vomiting, diarrhea with bloody diarrhea, positive abdominal cramping,  Genitourinary:  No  pain, No dysuria   Musculoskeletal:  No Arthralgias, No Back Pain, No Neck Pain, No recent trauma.  Skin:  No skin Lesions  Neuro:  No Weakness, No Numbness, No Paresthesias, No Dizziness, No Headache        Physical Exam     Initial Vitals [04/29/22 0036]   BP Pulse Resp Temp SpO2   (!) 179/107 (!) 137 20 98.3 °F (36.8 °C) 99 %      MAP       --         Physical Exam    Physical Exam:  CONSTITUTIONAL: Well developed, well nourished, in no acute distress.  HENT: Normocephalic, atraumatic    EYES: Sclerae anicteric   NECK: Supple, no thyroid enlargement  CARDIOVASCULAR:  Tachycardic and regular, without any murmurs, gallops, rubs.  RESPIRATORY: Speaking in full sentences. Breathing comfortably. Auscultation of the lungs revealed normal breath sounds b/l, no wheezing, no rales, no rhonchi.  ABDOMEN: Soft and nontender, no masses, no rebound or guarding   NEUROLOGIC: Alert, interacting normally. No facial droop.   MSK: Moving all four extremities.  Skin: Warm and dry. No visible rash on exposed areas of skin.    Psych:  Anxious appearing          ED Course   Procedures  Labs Reviewed   CBC W/ AUTO DIFFERENTIAL - Abnormal; Notable for the following components:       Result Value    Lymph # 5.1 (*)     All  other components within normal limits   COMPREHENSIVE METABOLIC PANEL - Abnormal; Notable for the following components:    ALT 50 (*)     All other components within normal limits   LIPASE   POCT URINE PREGNANCY   ISTAT PROCEDURE     EKG Readings: (Independently Interpreted)   Sinus tachycardia rate of 129 with no ischemic changes, some motion artifact, normal axis, intervals are unremarkable.     ECG Results          EKG 12-lead (Final result)  Result time 04/29/22 07:46:47    Final result by Interface, Lab In [facility] (04/29/22 07:46:47)                 Narrative:    Test Reason : R00.0,    Vent. Rate : 129 BPM     Atrial Rate : 129 BPM     P-R Int : 148 ms          QRS Dur : 094 ms      QT Int : 300 ms       P-R-T Axes : 066 064 037 degrees     QTc Int : 439 ms    Sinus tachycardia  Incomplete right bundle branch block  Borderline Abnormal ECG  When compared with ECG of 03-JUL-2021 13:30,  Vent. rate has increased BY  60 BPM  Incomplete right bundle branch block is now Present  Confirmed by Km RIDER MD ([id number]) on 4/29/2022 7:46:39 AM    Referred By: [email or web address]   SELF           Confirmed By:Km RIDER MD                            Imaging Results    None          Medications   sodium chloride 0.9% bolus 1,000 mL (0 mLs Intravenous Stopped 4/29/22 0252)   ciprofloxacin HCl tablet 500 mg (500 mg Oral Given 4/29/22 0252)   metroNIDAZOLE tablet 500 mg (500 mg Oral Given 4/29/22 0352)   ondansetron injection 4 mg (4 mg Intravenous Given 4/29/22 0353)     Medical Decision Making:   History:   Old Medical Records: I decided to obtain old medical records.  Old Records Summarized: records from clinic visits.  Independently Interpreted Test(s):   I have ordered and independently interpreted EKG Reading(s) - see prior notes  Clinical Tests:   Lab Tests: Reviewed and Ordered  Radiological Study: Ordered and Reviewed  Medical Tests: Reviewed and Ordered    21-year-old female with past history as noted coming in with 3 episodes  of bloody diarrhea in the context of having some initial nonbloody diarrhea.  On exam no acute distress, her abdomen is entirely soft nontender.  She is nontoxic.    Differential includes infectious diarrhea versus inflammatory diarrhea.  Low suspicion for dangerous colitis given her lack of abdominal tenderness.  Low suspicion for 0157:H7 E coli as she is well-appearing and nontoxic.    Will undertake labs look for any metabolic hematologic abnormalities, significant anemia.  Given IV fluids for tachycardia.  Pending rectal exam.             ED Course as of 04/29/22 1803 Fri Apr 29, 2022   0214 Rectal exam with no hemorrhoids or fissures seen.  Internal exam with empty vault with the secretions being mildly guaiac positive. [BA]   0214 I-STAT is benign, CBC with no increased W BC, no left shift.  Hemoglobin is normal.  Still getting IV fluids. [BA]      ED Course User Index  [BA] Van Carlton MD             Update:  Rest of labs are unremarkable.  She continues to be well-appearing and nontoxic.  Heart rate down to 100. Blood pressure stable.  She did have another bowel movement emergency department which was examined by me.  It is for brown diarrhea mixed in with what appears to be bright red blood.    Repeat abdominal exam continues to be benign and nontender.  Will cover with Cipro Flagyl possible infectious diarrhea.    Notified that she is hemodynamically stable, benign abdomen, normal hemoglobin, no indication for admission at this time.  Referral placed for GI but also recommended she follow-up with her primary care doctor.  ED return precautions for fever, worsening abdominal pain, dizziness, shortness of breath, increasing blood loss, or any other new, worsening worrisome symptoms.    Findings of ED work up explained to patient. Patient agrees with discharge plan and verbalizes understanding of return precautions.         Clinical Impression:   Final diagnoses:  [R00.0] Tachycardia  [R19.7] Bloody  diarrhea (Primary)          ED Disposition Condition    Discharge Stable        ED Prescriptions     Medication Sig Dispense Start Date End Date Auth. Provider    ciprofloxacin HCl (CIPRO) 500 MG tablet Take 1 tablet (500 mg total) by mouth 2 (two) times daily. for 7 days 14 tablet 4/29/2022 5/6/2022 Van Carlton MD    metroNIDAZOLE (FLAGYL) 500 MG tablet Take 1 tablet (500 mg total) by mouth every 12 (twelve) hours. for 7 days 14 tablet 4/29/2022 5/6/2022 Van Carlton MD    dicyclomine (BENTYL) 20 mg tablet Take 1 tablet (20 mg total) by mouth 2 (two) times daily as needed (abdominal cramping.). 15 tablet 4/29/2022 5/29/2022 Van Carlton MD        Follow-up Information     Follow up With Specialties Details Why Contact Info    Elbert Oliveira MD Family Medicine   [address] [place] [address]  [phone]             Van Carlton MD  04/29/22 8969

## 2022-04-29 NOTE — ED NOTES
I-STAT Chem-8+ Results:   Value Reference Range   Sodium 140 136-145 mmol/L   Potassium  3.9 3.5-5.1 mmol/L   Chloride 104  mmol/L   Ionized Calcium 1.14 1.06-1.42 mmol/L   CO2 (measured) 24 23-29 mmol/L   Glucose 91  mg/dL   BUN 10 6-30 mg/dL   Creatinine 0.6 0.5-1.4 mg/dL   Hematocrit 43 36-54%

## 2022-09-06 ENCOUNTER — HOSPITAL ENCOUNTER (EMERGENCY)
Facility: HOSPITAL | Age: 22
Discharge: HOME | End: 2022-09-06
Attending: EMERGENCY MEDICINE
Payer: MEDICAID

## 2022-09-06 VITALS
BODY MASS INDEX: 42.05 KG/M2 | DIASTOLIC BLOOD PRESSURE: 78 MMHG | SYSTOLIC BLOOD PRESSURE: 130 MMHG | WEIGHT: 245 LBS | HEART RATE: 88 BPM | OXYGEN SATURATION: 97 % | RESPIRATION RATE: 16 BRPM | TEMPERATURE: 98 F

## 2022-09-06 DIAGNOSIS — E86.0 DEHYDRATION: Primary | ICD-10-CM

## 2022-09-06 DIAGNOSIS — N92.0 MENORRHAGIA WITH REGULAR CYCLE: ICD-10-CM

## 2022-09-06 DIAGNOSIS — R55 SYNCOPE: ICD-10-CM

## 2022-09-06 LAB
ALBUMIN SERPL BCP-MCNC: 4 G/DL (ref 3.5–5.2)
ALP SERPL-CCNC: 82 U/L (ref 55–135)
ALT SERPL W/O P-5'-P-CCNC: 48 U/L (ref 10–44)
ANION GAP SERPL CALC-SCNC: 13 MMOL/L (ref 8–16)
AST SERPL-CCNC: 32 U/L (ref 10–40)
B-HCG UR QL: NEGATIVE
BASOPHILS # BLD AUTO: 0.03 K/UL (ref 0–0.2)
BASOPHILS NFR BLD: 0.3 % (ref 0–1.9)
BILIRUB SERPL-MCNC: 0.7 MG/DL (ref 0.1–1)
BILIRUB UR QL STRIP: NEGATIVE
BUN SERPL-MCNC: 9 MG/DL (ref 6–20)
CALCIUM SERPL-MCNC: 9.4 MG/DL (ref 8.7–10.5)
CHLORIDE SERPL-SCNC: 106 MMOL/L (ref 95–110)
CLARITY UR: ABNORMAL
CO2 SERPL-SCNC: 20 MMOL/L (ref 23–29)
COLOR UR: ABNORMAL
CREAT SERPL-MCNC: 0.7 MG/DL (ref 0.5–1.4)
DIFFERENTIAL METHOD: NORMAL
EOSINOPHIL # BLD AUTO: 0.1 K/UL (ref 0–0.5)
EOSINOPHIL NFR BLD: 1.3 % (ref 0–8)
ERYTHROCYTE [DISTWIDTH] IN BLOOD BY AUTOMATED COUNT: 12.2 % (ref 11.5–14.5)
EST. GFR  (NO RACE VARIABLE): >60 ML/MIN/1.73 M^2
GLUCOSE SERPL-MCNC: 91 MG/DL (ref 70–110)
GLUCOSE UR QL STRIP: NEGATIVE
HCT VFR BLD AUTO: 43.2 % (ref 37–48.5)
HGB BLD-MCNC: 15.1 G/DL (ref 12–16)
HGB UR QL STRIP: ABNORMAL
IMM GRANULOCYTES # BLD AUTO: 0.03 K/UL (ref 0–0.04)
IMM GRANULOCYTES NFR BLD AUTO: 0.3 % (ref 0–0.5)
KETONES UR QL STRIP: ABNORMAL
LEUKOCYTE ESTERASE UR QL STRIP: NEGATIVE
LYMPHOCYTES # BLD AUTO: 3.1 K/UL (ref 1–4.8)
LYMPHOCYTES NFR BLD: 30.3 % (ref 18–48)
MCH RBC QN AUTO: 30.4 PG (ref 27–31)
MCHC RBC AUTO-ENTMCNC: 35 G/DL (ref 32–36)
MCV RBC AUTO: 87 FL (ref 82–98)
MICROSCOPIC COMMENT: ABNORMAL
MONOCYTES # BLD AUTO: 0.5 K/UL (ref 0.3–1)
MONOCYTES NFR BLD: 4.8 % (ref 4–15)
NEUTROPHILS # BLD AUTO: 6.4 K/UL (ref 1.8–7.7)
NEUTROPHILS NFR BLD: 63 % (ref 38–73)
NITRITE UR QL STRIP: NEGATIVE
NRBC BLD-RTO: 0 /100 WBC
PH UR STRIP: 7 [PH] (ref 5–8)
PLATELET # BLD AUTO: 263 K/UL (ref 150–450)
PMV BLD AUTO: 10.5 FL (ref 9.2–12.9)
POCT GLUCOSE: 89 MG/DL (ref 70–110)
POTASSIUM SERPL-SCNC: 4.5 MMOL/L (ref 3.5–5.1)
PROT SERPL-MCNC: 7.2 G/DL (ref 6–8.4)
PROT UR QL STRIP: ABNORMAL
RBC # BLD AUTO: 4.97 M/UL (ref 4–5.4)
RBC #/AREA URNS HPF: >100 /HPF (ref 0–4)
SODIUM SERPL-SCNC: 139 MMOL/L (ref 136–145)
SP GR UR STRIP: 1.02 (ref 1–1.03)
SQUAMOUS #/AREA URNS HPF: 2 /HPF
TROPONIN I SERPL DL<=0.01 NG/ML-MCNC: <0.006 NG/ML (ref 0–0.03)
URN SPEC COLLECT METH UR: ABNORMAL
UROBILINOGEN UR STRIP-ACNC: NEGATIVE EU/DL
WBC # BLD AUTO: 10.09 K/UL (ref 3.9–12.7)
WBC #/AREA URNS HPF: 3 /HPF (ref 0–5)

## 2022-09-06 PROCEDURE — 93010 ELECTROCARDIOGRAM REPORT: CPT | Mod: ,,, | Performed by: INTERNAL MEDICINE

## 2022-09-06 PROCEDURE — 96361 HYDRATE IV INFUSION ADD-ON: CPT

## 2022-09-06 PROCEDURE — 80053 COMPREHEN METABOLIC PANEL: CPT | Performed by: PHYSICIAN ASSISTANT

## 2022-09-06 PROCEDURE — 96376 TX/PRO/DX INJ SAME DRUG ADON: CPT

## 2022-09-06 PROCEDURE — 81000 URINALYSIS NONAUTO W/SCOPE: CPT | Performed by: PHYSICIAN ASSISTANT

## 2022-09-06 PROCEDURE — 96374 THER/PROPH/DIAG INJ IV PUSH: CPT

## 2022-09-06 PROCEDURE — 85025 COMPLETE CBC W/AUTO DIFF WBC: CPT | Performed by: PHYSICIAN ASSISTANT

## 2022-09-06 PROCEDURE — 93005 ELECTROCARDIOGRAM TRACING: CPT

## 2022-09-06 PROCEDURE — 96375 TX/PRO/DX INJ NEW DRUG ADDON: CPT

## 2022-09-06 PROCEDURE — 63600175 PHARM REV CODE 636 W HCPCS: Performed by: EMERGENCY MEDICINE

## 2022-09-06 PROCEDURE — 84484 ASSAY OF TROPONIN QUANT: CPT | Performed by: PHYSICIAN ASSISTANT

## 2022-09-06 PROCEDURE — 99285 EMERGENCY DEPT VISIT HI MDM: CPT | Mod: 25

## 2022-09-06 PROCEDURE — 81025 URINE PREGNANCY TEST: CPT | Performed by: EMERGENCY MEDICINE

## 2022-09-06 PROCEDURE — 93010 EKG 12-LEAD: ICD-10-PCS | Mod: ,,, | Performed by: INTERNAL MEDICINE

## 2022-09-06 RX ORDER — ONDANSETRON 2 MG/ML
4 INJECTION INTRAMUSCULAR; INTRAVENOUS
Status: COMPLETED | OUTPATIENT
Start: 2022-09-06 | End: 2022-09-06

## 2022-09-06 RX ORDER — KETOROLAC TROMETHAMINE 30 MG/ML
15 INJECTION, SOLUTION INTRAMUSCULAR; INTRAVENOUS
Status: COMPLETED | OUTPATIENT
Start: 2022-09-06 | End: 2022-09-06

## 2022-09-06 RX ORDER — DIPHENHYDRAMINE HYDROCHLORIDE 50 MG/ML
25 INJECTION INTRAMUSCULAR; INTRAVENOUS
Status: COMPLETED | OUTPATIENT
Start: 2022-09-06 | End: 2022-09-06

## 2022-09-06 RX ORDER — FLUCONAZOLE 150 MG/1
150 TABLET ORAL
COMMUNITY
Start: 2022-04-27 | End: 2022-09-06

## 2022-09-06 RX ORDER — METOCLOPRAMIDE HYDROCHLORIDE 5 MG/ML
10 INJECTION INTRAMUSCULAR; INTRAVENOUS
Status: COMPLETED | OUTPATIENT
Start: 2022-09-06 | End: 2022-09-06

## 2022-09-06 RX ADMIN — ONDANSETRON 4 MG: 2 INJECTION INTRAMUSCULAR; INTRAVENOUS at 06:09

## 2022-09-06 RX ADMIN — METOCLOPRAMIDE 10 MG: 5 INJECTION, SOLUTION INTRAMUSCULAR; INTRAVENOUS at 11:09

## 2022-09-06 RX ADMIN — SODIUM CHLORIDE, SODIUM LACTATE, POTASSIUM CHLORIDE, AND CALCIUM CHLORIDE 1000 ML: .6; .31; .03; .02 INJECTION, SOLUTION INTRAVENOUS at 07:09

## 2022-09-06 RX ADMIN — ONDANSETRON 4 MG: 2 INJECTION INTRAMUSCULAR; INTRAVENOUS at 08:09

## 2022-09-06 RX ADMIN — DIPHENHYDRAMINE HYDROCHLORIDE 25 MG: 50 INJECTION, SOLUTION INTRAMUSCULAR; INTRAVENOUS at 11:09

## 2022-09-06 RX ADMIN — KETOROLAC TROMETHAMINE 15 MG: 30 INJECTION, SOLUTION INTRAMUSCULAR; INTRAVENOUS at 08:09

## 2022-09-06 NOTE — FIRST PROVIDER EVALUATION
" Emergency Department TeleTriage Encounter Note      CHIEF COMPLAINT    Chief Complaint   Patient presents with    Loss of Consciousness     Pt reports 5/6 syncopal episodes, x 3 days pt states hit head during syncopal episode 2 days ago, denies cp/sob, + HA x 2 days, + currently on menstrual cycle, reports heavier then normal bleeding      Emesis     Intermittent emesis x 2 days, pt walks with steady gait, pt has hx of PTSD and complains of " brain fog"  VAN neg in triage, walks with steady gait        VITAL SIGNS   Initial Vitals [09/06/22 1723]   BP Pulse Resp Temp SpO2   135/80 103 18 98.8 °F (37.1 °C) 98 %      MAP       --            ALLERGIES    Review of patient's allergies indicates:   Allergen Reactions    Latex, natural rubber Rash    Sulfa (sulfonamide antibiotics) Hives       PROVIDER TRIAGE NOTE  Patient presents to the ER with complaint of syncopal episode while in the shower 3 days ago.  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  She had 2 syncopal episodes last night while talking with her .  She is having vomiting x 2 days, 3 total episodes.  Patient reports headache starting 2 days ago and does reports hitting her head prior to headache beginning.  She has some improvement with tylenol.  Headache is rated 6/10.  She is having menstrual cramping.   Patient in no acute distress, VSS, will order labs, give IV fluids, pending ED provider evaluation.  Will defer need for head CT to examining provider     Initial orders will be placed and care will be transferred to an alternate provider when patient is roomed for a full evaluation. Any additional orders and the final disposition will be determined by that provider.         ORDERS  Labs Reviewed - No data to display    ED Orders (720h ago, onward)      None              Virtual Visit Note: The provider triage portion of this emergency department evaluation and documentation was performed via [employer], a " HIPAA-compliant telemedicine application, in concert with a tele-presenter in the room. A face to face patient evaluation with one of my colleagues will occur once the patient is placed in an emergency department room.      DISCLAIMER: This note was prepared with [employer] voice recognition transcription software. Garbled syntax, mangled pronouns, and other bizarre constructions may be attributed to that software system.

## 2022-09-06 NOTE — Clinical Note
"Davida Ovalle" Issa was seen and treated in our emergency department on 9/6/2022.  She may return to work on 09/08/2022.       If you have any questions or concerns, please don't hesitate to call.       RN    "

## 2022-09-07 NOTE — PHARMACY MED REC
"  [facility]           Pharmacy  Admission Medication History     The home medication history was taken by Ninfa Lainez.      Medication history obtained from Medications listed below were obtained from: Patient/family    Based on information gathered for medication list, you may go to "Admission" then "Reconcile Home Medications" tabs to review and/or act upon those items.     The home medication list has been updated by the Pharmacy department.   Please read ALL comments highlighted in yellow.   Please address this information as you see fit.    Feel free to contact us if you have any questions or require assistance.    The medications listed below were removed from the home medication list.  Please reorder if appropriate:    Patient reports NOT TAKING the following medication(s):  Zpak 250mg  Zyrtec 10mg  Voltaren 1% gel  Flonase nasal  Percocet 5-325mg  Phenergan 25mg  Inderal 20mg  Zoloft 50mg  Diflucan 150mg      Current Facility-Administered Medications on File Prior to Encounter   Medication Dose Route Frequency Provider Last Rate Last Admin    etonogestreL subdermal device 68 mg  68 mg   Robert Guo MD   68 mg at 08/07/20 1600     Current Outpatient Medications on File Prior to Encounter   Medication Sig Dispense Refill    albuterol (PROVENTIL/VENTOLIN HFA) 90 mcg/actuation inhaler INHALE 1 PUFF EVERY 4-6 HOURS (Patient taking differently: 1 puff every 4 to 6 hours as needed.) 18 g 0    cyclobenzaprine (FLEXERIL) 10 MG tablet Take 1 tablet (10 mg total) by mouth nightly. 30 tablet 0    diazePAM (VALIUM) 2 MG tablet Take 1 tablet (2 mg total) by mouth daily as needed for Anxiety. 30 tablet 0    levothyroxine (TIROSINT) 50 mcg Cap Take 50 mcg by mouth before breakfast. 30 capsule 2    loratadine (CLARITIN) 10 mg tablet TAKE 1 TABLET BY MOUTH EVERY DAY (Patient taking differently: Take 10 mg by mouth once daily.) 30 tablet 0    ondansetron (ZOFRAN-ODT) 4 MG TbDL TAKE 1 TABLET " BY MOUTH EVERY 4 HOURS AS NEEDED FOR NAUSEA (Patient taking differently: Take 4 mg by mouth every 4 (four) hours as needed (nausea).) 20 tablet 1    citalopram (CELEXA) 20 MG tablet Take 1 tablet (20 mg total) by mouth once daily. (Patient not taking: Reported on 9/6/2022) 30 tablet 1    FLUoxetine 10 MG capsule Take 10 mg by mouth once daily.      [DISCONTINUED] azithromycin (Z-GENEVIEVE) 250 MG tablet Take 1 tablet (250 mg total) by mouth once daily. 1 pack, take as directed 6 tablet 0    [DISCONTINUED] cetirizine (ZYRTEC) 10 MG tablet Take 1 tablet (10 mg total) by mouth once daily. 30 tablet 0    [DISCONTINUED] diclofenac sodium (VOLTAREN) 1 % Gel Apply 2 g topically 3 (three) times daily. 100 g 0    [DISCONTINUED] fluconazole (DIFLUCAN) 150 MG Tab Take 150 mg by mouth.      [DISCONTINUED] fluticasone propionate (FLONASE) 50 mcg/actuation nasal spray INHALE 1-2 SPRAYS IN EACH NOSTRIL DAILY 16 mL 1    [DISCONTINUED] ondansetron (ZOFRAN) 4 MG tablet Take 1 tablet (4 mg total) by mouth every 8 (eight) hours as needed for Nausea. 30 tablet 0    [DISCONTINUED] oxyCODONE-acetaminophen (PERCOCET) 5-325 mg per tablet Take 1 tablet by mouth every 4 (four) hours as needed for Pain. 10 tablet 0    [DISCONTINUED] promethazine (PHENERGAN) 25 MG tablet Take 1 tablet (25 mg total) by mouth every 6 (six) hours as needed for Nausea. (Patient not taking: Reported on 3/8/2021) 15 tablet 0    [DISCONTINUED] promethazine (PHENERGAN) 25 MG tablet Take 1 tablet (25 mg total) by mouth every 12 (twelve) hours as needed for Nausea. (Patient not taking: Reported on 5/7/2020) 30 tablet 1    [DISCONTINUED] propranoloL (INDERAL) 20 MG tablet Take 1 tablet (20 mg total) by mouth 2 (two) times daily. (Patient not taking: Reported on 5/7/2020) 60 tablet 0    [DISCONTINUED] sertraline (ZOLOFT) 50 MG tablet Take 1 tablet (50 mg total) by mouth once daily. (Patient not taking: Reported on 2/28/2019) 30 tablet 2       Please address this information as  you see fit.  Feel free to contact us if you have any questions or require assistance.    Ninfa Lainez  [phone]                .

## 2022-09-07 NOTE — ED PROVIDER NOTES
"Encounter Date: 9/6/2022       History     Chief Complaint   Patient presents with    Loss of Consciousness     Pt reports 5/6 syncopal episodes, x 3 days pt states hit head during syncopal episode 2 days ago, denies cp/sob, + HA x 2 days, + currently on menstrual cycle, reports heavier then normal bleeding      Emesis     Intermittent emesis x 2 days, pt walks with steady gait, pt has hx of PTSD and complains of " brain fog"  VAN neg in triage, walks with steady gait      Patient is a 22-year-old female who presents to the emergency department with recurrent syncopal episodes for the past 5 days.  She states she had a syncopal episode in the shower Saturday and awakened before she the ground.  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  On Monday night she had 2 more episodes of syncope and today this morning she had another episode of syncope.  It did not injure herself in any the other episodes of syncope.  The patient states she is on Nexplanon birth control for the past 5 years and normally has a menses of about 2 days of light spotting but she started having heavy vaginal bleeding on Friday.  She has some lower abdominal cramping along with the menses.  She states 2 of the episodes were witnessed by her  and there was no mention of seizure activity.    Review of patient's allergies indicates:   Allergen Reactions    Latex, natural rubber Rash    Sulfa (sulfonamide antibiotics) Hives     Past Medical History:   Diagnosis Date    Mental disorder     anxiety and depression    Ovarian cyst     Trauma     patient states she has both desceased parents and suffers from depression and anxiety     Past Surgical History:   Procedure Laterality Date    LAPAROSCOPIC CHOLECYSTECTOMY N/A 7/4/2021    Procedure: CHOLECYSTECTOMY, LAPAROSCOPIC;  Surgeon: Mauricio An MD;  Location: [facility] OR [address] [place] [address];  Service: General;  Laterality: N/A;    TONSILLECTOMY "       Family History   Problem Relation Age of Onset    Cancer Father         skin    Hypertension Maternal Grandmother     Diabetes Paternal Grandfather     Amblyopia Neg Hx     Blindness Neg Hx     Cataracts Neg Hx     Glaucoma Neg Hx     Macular degeneration Neg Hx     Retinal detachment Neg Hx      Social History     Tobacco Use    Smoking status: Every Day     Packs/day: 0.50     Types: Cigarettes    Smokeless tobacco: Current   Substance Use Topics    Alcohol use: Yes     Alcohol/week: 0.0 standard drinks     Comment: occationally    Drug use: Yes     Types: Marijuana     Comment: Ectasy     Review of Systems   Constitutional:  Negative for activity change, appetite change, fatigue and fever.   HENT:  Negative for sore throat.    Respiratory:  Negative for shortness of breath.    Cardiovascular:  Negative for chest pain.   Gastrointestinal:  Negative for nausea.   Genitourinary:  Negative for dysuria.   Musculoskeletal:  Negative for back pain.   Skin:  Negative for rash.   Neurological:  Negative for weakness.   Hematological:  Does not bruise/bleed easily.     Physical Exam     Initial Vitals [09/06/22 1723]   BP Pulse Resp Temp SpO2   135/80 103 18 98.8 °F (37.1 °C) 98 %      MAP       --         Physical Exam    Nursing note and vitals reviewed.  Constitutional: She appears well-developed and well-nourished.   HENT:   Head: Normocephalic and atraumatic.   Mouth/Throat: Oropharynx is clear and moist.   Eyes: Conjunctivae and EOM are normal. Pupils are equal, round, and reactive to light.   Neck: Neck supple.   Normal range of motion.  Cardiovascular:  Normal rate, regular rhythm, normal heart sounds and intact distal pulses.     Exam reveals no gallop and no friction rub.       No murmur heard.  Pulmonary/Chest: Breath sounds normal.   Abdominal: Abdomen is soft. Bowel sounds are normal. She exhibits no distension. There is no abdominal tenderness. There is no rebound and no guarding.   Musculoskeletal:          General: No tenderness or edema. Normal range of motion.      Cervical back: Normal range of motion and neck supple.     Lymphadenopathy:     She has no cervical adenopathy.   Neurological: She is alert and oriented to person, place, and time. She has normal strength and normal reflexes. GCS score is 15. GCS eye subscore is 4. GCS verbal subscore is 5. GCS motor subscore is 6.   Skin: Skin is warm and dry.   Psychiatric: She has a normal mood and affect. Her behavior is normal. Judgment and thought content normal.       ED Course   Procedures  Labs Reviewed   COMPREHENSIVE METABOLIC PANEL - Abnormal; Notable for the following components:       Result Value    CO2 20 (*)     ALT 48 (*)     All other components within normal limits   URINALYSIS, REFLEX TO URINE CULTURE - Abnormal; Notable for the following components:    Color, UA Orange (*)     Appearance, UA Hazy (*)     Protein, UA Trace (*)     Ketones, UA 2+ (*)     Occult Blood UA 3+ (*)     All other components within normal limits    Narrative:     Specimen Source->Urine   URINALYSIS MICROSCOPIC - Abnormal; Notable for the following components:    RBC, UA >100 (*)     All other components within normal limits    Narrative:     Specimen Source->Urine   CBC W/ AUTO DIFFERENTIAL   TROPONIN I   PREGNANCY TEST, URINE RAPID    Narrative:     Specimen Source->Urine   SARS-COV-2 RDRP GENE   POCT GLUCOSE   POCT GLUCOSE MONITORING CONTINUOUS          Imaging Results              CT Head Without Contrast (Final result)  Result time 09/06/22 21:05:32      Final result by Jaime Valle MD (09/06/22 21:05:32)                   Impression:      No acute intracranial process.      Electronically signed by: Jaime Valle  Date:    09/06/2022  Time:    21:05               Narrative:    EXAMINATION:  CT HEAD WITHOUT CONTRAST    CLINICAL HISTORY:  Syncope, recurrent;    TECHNIQUE:  Low dose axial CT images obtained throughout the head without intravenous contrast.  Sagittal and coronal reconstructions were performed.    COMPARISON:  03/26/2020    FINDINGS:  Intracranial compartment:    Ventricles and sulci are normal in size for age without evidence of hydrocephalus.  Cavum septum pellucidum.    No extra-axial blood or fluid collections.    The brain parenchyma appears normal. No parenchymal mass, hemorrhage, edema or major vascular distribution infarct.    Skull/extracranial contents (limited evaluation): No fracture. Mastoid air cells and paranasal sinuses are essentially clear.    No significant change.                                       Medications   diphenhydrAMINE injection 25 mg (has no administration in time range)   metoclopramide HCl injection 10 mg (has no administration in time range)   ondansetron injection 4 mg (4 mg Intravenous Given 9/6/22 1822)   lactated ringers bolus 1,000 mL (0 mLs Intravenous Stopped 9/6/22 2137)   ondansetron injection 4 mg (4 mg Intravenous Given 9/6/22 2054)   ketorolac injection 15 mg (15 mg Intravenous Given 9/6/22 2054)                 ED Course as of 09/06/22 2322   Tue Sep 06, 2022   2253 Although the orthostatic vital signs never showed hypotension or tachycardia, the HR did increase by 30 points from lying to standing. The patient was given a liter of LR. She will be discharged at this time.  She has had heavy bleeding this menses and I feel that she was likely dehydrated.  She is feeling better and will be discharged. [ST]   2321 The patient states she still has a HA and would like something else. She will be given reglan and benadryl and discharged. [ST]      ED Course User Index  [ST] Sobia Hernandez MD             Clinical Impression:   Final diagnoses:  [R55] Syncope  [E86.0] Dehydration (Primary)  [N92.0] Menorrhagia with regular cycle        ED Disposition Condition    Discharge Stable          ED Prescriptions    None       Follow-up Information       Follow up With Specialties Details Why Contact Info    Elbert VELÁSQUEZ  MD [facility] Schedule an appointment as soon as possible for a visit   [address] [facility] [id number] [phone]               Sobia Hernandez MD  09/06/22 [id number]       Sobia Hernandez MD  09/06/22 1869

## 2022-09-12 ENCOUNTER — HOSPITAL ENCOUNTER (EMERGENCY)
Facility: HOSPITAL | Age: 22
Discharge: HOME | End: 2022-09-13
Attending: EMERGENCY MEDICINE
Payer: MEDICAID

## 2022-09-12 DIAGNOSIS — R52 PAIN: ICD-10-CM

## 2022-09-12 DIAGNOSIS — R10.30 LOWER ABDOMINAL PAIN: Primary | ICD-10-CM

## 2022-09-12 LAB
ALBUMIN SERPL BCP-MCNC: 4.3 G/DL (ref 3.5–5.2)
ALP SERPL-CCNC: 79 U/L (ref 55–135)
ALT SERPL W/O P-5'-P-CCNC: 68 U/L (ref 10–44)
ANION GAP SERPL CALC-SCNC: 13 MMOL/L (ref 8–16)
AST SERPL-CCNC: 47 U/L (ref 10–40)
B-HCG UR QL: NEGATIVE
BACTERIA #/AREA URNS HPF: NORMAL /HPF
BASOPHILS # BLD AUTO: 0.03 K/UL (ref 0–0.2)
BASOPHILS NFR BLD: 0.3 % (ref 0–1.9)
BILIRUB SERPL-MCNC: 0.5 MG/DL (ref 0.1–1)
BILIRUB UR QL STRIP: NEGATIVE
BUN SERPL-MCNC: 11 MG/DL (ref 6–20)
CALCIUM SERPL-MCNC: 9.2 MG/DL (ref 8.7–10.5)
CHLORIDE SERPL-SCNC: 105 MMOL/L (ref 95–110)
CLARITY UR: CLEAR
CO2 SERPL-SCNC: 21 MMOL/L (ref 23–29)
COLOR UR: YELLOW
CREAT SERPL-MCNC: 0.8 MG/DL (ref 0.5–1.4)
CTP QC/QA: YES
DIFFERENTIAL METHOD: NORMAL
EOSINOPHIL # BLD AUTO: 0.1 K/UL (ref 0–0.5)
EOSINOPHIL NFR BLD: 1.2 % (ref 0–8)
ERYTHROCYTE [DISTWIDTH] IN BLOOD BY AUTOMATED COUNT: 12.1 % (ref 11.5–14.5)
EST. GFR  (NO RACE VARIABLE): >60 ML/MIN/1.73 M^2
GLUCOSE SERPL-MCNC: 96 MG/DL (ref 70–110)
GLUCOSE UR QL STRIP: NEGATIVE
HCT VFR BLD AUTO: 41.3 % (ref 37–48.5)
HGB BLD-MCNC: 13.9 G/DL (ref 12–16)
HGB UR QL STRIP: NEGATIVE
HYALINE CASTS #/AREA URNS LPF: 0 /LPF
IMM GRANULOCYTES # BLD AUTO: 0.03 K/UL (ref 0–0.04)
IMM GRANULOCYTES NFR BLD AUTO: 0.3 % (ref 0–0.5)
KETONES UR QL STRIP: ABNORMAL
LEUKOCYTE ESTERASE UR QL STRIP: NEGATIVE
LIPASE SERPL-CCNC: 15 U/L (ref 4–60)
LYMPHOCYTES # BLD AUTO: 4 K/UL (ref 1–4.8)
LYMPHOCYTES NFR BLD: 38.2 % (ref 18–48)
MCH RBC QN AUTO: 29.4 PG (ref 27–31)
MCHC RBC AUTO-ENTMCNC: 33.7 G/DL (ref 32–36)
MCV RBC AUTO: 87 FL (ref 82–98)
MICROSCOPIC COMMENT: NORMAL
MONOCYTES # BLD AUTO: 0.6 K/UL (ref 0.3–1)
MONOCYTES NFR BLD: 5.6 % (ref 4–15)
NEUTROPHILS # BLD AUTO: 5.7 K/UL (ref 1.8–7.7)
NEUTROPHILS NFR BLD: 54.4 % (ref 38–73)
NITRITE UR QL STRIP: NEGATIVE
NRBC BLD-RTO: 0 /100 WBC
PH UR STRIP: 6 [PH] (ref 5–8)
PLATELET # BLD AUTO: 260 K/UL (ref 150–450)
PMV BLD AUTO: 10.6 FL (ref 9.2–12.9)
POTASSIUM SERPL-SCNC: 5.9 MMOL/L (ref 3.5–5.1)
PROT SERPL-MCNC: 7.8 G/DL (ref 6–8.4)
PROT UR QL STRIP: ABNORMAL
RBC # BLD AUTO: 4.73 M/UL (ref 4–5.4)
RBC #/AREA URNS HPF: 1 /HPF (ref 0–4)
SODIUM SERPL-SCNC: 139 MMOL/L (ref 136–145)
SP GR UR STRIP: >1.03 (ref 1–1.03)
SQUAMOUS #/AREA URNS HPF: 5 /HPF
URN SPEC COLLECT METH UR: ABNORMAL
UROBILINOGEN UR STRIP-ACNC: ABNORMAL EU/DL
WBC # BLD AUTO: 10.53 K/UL (ref 3.9–12.7)
WBC #/AREA URNS HPF: 2 /HPF (ref 0–5)

## 2022-09-12 PROCEDURE — 80053 COMPREHEN METABOLIC PANEL: CPT | Performed by: PHYSICIAN ASSISTANT

## 2022-09-12 PROCEDURE — 83690 ASSAY OF LIPASE: CPT | Performed by: PHYSICIAN ASSISTANT

## 2022-09-12 PROCEDURE — 25000003 PHARM REV CODE 250: Performed by: PHYSICIAN ASSISTANT

## 2022-09-12 PROCEDURE — 85025 COMPLETE CBC W/AUTO DIFF WBC: CPT | Performed by: PHYSICIAN ASSISTANT

## 2022-09-12 PROCEDURE — 25500020 PHARM REV CODE 255: Performed by: EMERGENCY MEDICINE

## 2022-09-12 PROCEDURE — 96374 THER/PROPH/DIAG INJ IV PUSH: CPT

## 2022-09-12 PROCEDURE — 81025 URINE PREGNANCY TEST: CPT | Performed by: PHYSICIAN ASSISTANT

## 2022-09-12 PROCEDURE — 96375 TX/PRO/DX INJ NEW DRUG ADDON: CPT

## 2022-09-12 PROCEDURE — 81000 URINALYSIS NONAUTO W/SCOPE: CPT | Performed by: PHYSICIAN ASSISTANT

## 2022-09-12 PROCEDURE — 63600175 PHARM REV CODE 636 W HCPCS: Performed by: EMERGENCY MEDICINE

## 2022-09-12 PROCEDURE — 99285 EMERGENCY DEPT VISIT HI MDM: CPT | Mod: 25

## 2022-09-12 PROCEDURE — 96376 TX/PRO/DX INJ SAME DRUG ADON: CPT

## 2022-09-12 PROCEDURE — 63600175 PHARM REV CODE 636 W HCPCS: Performed by: PHYSICIAN ASSISTANT

## 2022-09-12 PROCEDURE — 96361 HYDRATE IV INFUSION ADD-ON: CPT

## 2022-09-12 RX ORDER — MORPHINE SULFATE 4 MG/ML
4 INJECTION, SOLUTION INTRAMUSCULAR; INTRAVENOUS
Status: COMPLETED | OUTPATIENT
Start: 2022-09-12 | End: 2022-09-12

## 2022-09-12 RX ORDER — KETOROLAC TROMETHAMINE 30 MG/ML
15 INJECTION, SOLUTION INTRAMUSCULAR; INTRAVENOUS
Status: DISCONTINUED | OUTPATIENT
Start: 2022-09-12 | End: 2022-09-12

## 2022-09-12 RX ORDER — METOCLOPRAMIDE HYDROCHLORIDE 5 MG/ML
5 INJECTION INTRAMUSCULAR; INTRAVENOUS
Status: COMPLETED | OUTPATIENT
Start: 2022-09-13 | End: 2022-09-13

## 2022-09-12 RX ORDER — ONDANSETRON 2 MG/ML
4 INJECTION INTRAMUSCULAR; INTRAVENOUS
Status: COMPLETED | OUTPATIENT
Start: 2022-09-12 | End: 2022-09-12

## 2022-09-12 RX ADMIN — MORPHINE SULFATE 4 MG: 4 INJECTION INTRAVENOUS at 08:09

## 2022-09-12 RX ADMIN — IOHEXOL 100 ML: 350 INJECTION, SOLUTION INTRAVENOUS at 10:09

## 2022-09-12 RX ADMIN — ONDANSETRON 4 MG: 2 INJECTION INTRAMUSCULAR; INTRAVENOUS at 08:09

## 2022-09-12 RX ADMIN — SODIUM CHLORIDE 1000 ML: 0.9 INJECTION, SOLUTION INTRAVENOUS at 08:09

## 2022-09-12 RX ADMIN — MORPHINE SULFATE 4 MG: 4 INJECTION INTRAVENOUS at 10:09

## 2022-09-12 NOTE — Clinical Note
"Davida Learyserenity Parsons was seen and treated in our emergency department on 9/12/2022.  She may return to work on 09/14/2022.       If you have any questions or concerns, please don't hesitate to call.      Josette Gabriel RN RN    "

## 2022-09-13 VITALS
BODY MASS INDEX: 40.76 KG/M2 | HEART RATE: 88 BPM | WEIGHT: 237.44 LBS | OXYGEN SATURATION: 98 % | RESPIRATION RATE: 16 BRPM | DIASTOLIC BLOOD PRESSURE: 84 MMHG | TEMPERATURE: 98 F | SYSTOLIC BLOOD PRESSURE: 128 MMHG

## 2022-09-13 PROCEDURE — 63600175 PHARM REV CODE 636 W HCPCS: Performed by: EMERGENCY MEDICINE

## 2022-09-13 PROCEDURE — 25000003 PHARM REV CODE 250: Performed by: EMERGENCY MEDICINE

## 2022-09-13 RX ORDER — ONDANSETRON 4 MG/1
4 TABLET, ORALLY DISINTEGRATING ORAL
Status: COMPLETED | OUTPATIENT
Start: 2022-09-13 | End: 2022-09-13

## 2022-09-13 RX ORDER — OXYCODONE HYDROCHLORIDE 5 MG/1
5 TABLET ORAL
Status: COMPLETED | OUTPATIENT
Start: 2022-09-13 | End: 2022-09-13

## 2022-09-13 RX ORDER — ONDANSETRON 4 MG/1
4 TABLET, FILM COATED ORAL EVERY 6 HOURS
Qty: 12 TABLET | Refills: 0 | Status: SHIPPED | OUTPATIENT
Start: 2022-09-13 | End: 2022-09-22 | Stop reason: SDUPTHER

## 2022-09-13 RX ADMIN — OXYCODONE HYDROCHLORIDE 5 MG: 5 TABLET ORAL at 01:09

## 2022-09-13 RX ADMIN — ONDANSETRON 4 MG: 4 TABLET, ORALLY DISINTEGRATING ORAL at 01:09

## 2022-09-13 RX ADMIN — METOCLOPRAMIDE 5 MG: 5 INJECTION, SOLUTION INTRAMUSCULAR; INTRAVENOUS at 12:09

## 2022-09-13 NOTE — ED NOTES
Patient received for discharge.  Patient is awake, alert, and oriented x 4.  Speech clear and appropriate.  RR regular and non labored.  Skin W/D/P.  Given written and verbal discharge instruction, with verbal understanding.  Patient given the opportunity to ask questions, with answers to meet needs.  No complaints or noted concerns.  Ambulatory with steady gait through ED to [facility].

## 2022-09-13 NOTE — FIRST PROVIDER EVALUATION
" Emergency Department TeleTriage Encounter Note      CHIEF COMPLAINT    Chief Complaint   Patient presents with    Abdominal Pain     Patient reports intermittent RLQ cramping pain that started yesterday. Patient states "comes in waves."  Pain does not move. N/V x 2 episodes. Diarrhea last 2 days. Was seen at , [facility]. Was told possible appendicitis. Denies fever. Denies dysuria but states urine has been dark.       VITAL SIGNS   Initial Vitals [09/12/22 1915]   BP Pulse Resp Temp SpO2   (!) 142/73 (!) 115 17 98.4 °F (36.9 °C) 98 %      MAP       --            ALLERGIES    Review of patient's allergies indicates:   Allergen Reactions    Latex, natural rubber Rash    Sulfa (sulfonamide antibiotics) Hives       PROVIDER TRIAGE NOTE  This is a teletriage evaluation of a 22 y.o. female presenting to the ED complaining of right lower quadrant abdominal pain and vomiting.  Saw urgent care prior to coming to ED who sent her here to rule out appendicitis.     Initial orders will be placed and care will be transferred to an alternate provider when patient is roomed for a full evaluation. Any additional orders and the final disposition will be determined by that provider.         ORDERS  Labs Reviewed   CBC W/ AUTO DIFFERENTIAL   COMPREHENSIVE METABOLIC PANEL   LIPASE   URINALYSIS, REFLEX TO URINE CULTURE   POCT URINE PREGNANCY       ED Orders (720h ago, onward)      Start Ordered     Status Ordering Provider    09/12/22 1945 09/12/22 1930  sodium chloride 0.9% bolus 1,000 mL  ED 1 Time         Ordered BARI LARKIN    09/12/22 1945 09/12/22 1930  ondansetron injection 4 mg  ED 1 Time         Ordered BARI LARKIN    09/12/22 1945 09/12/22 1930  ketorolac injection 15 mg  ED 1 Time         Ordered BARI LARKIN    09/12/22 1930 09/12/22 1930  Urinalysis, Reflex to Urine Culture Urine, Clean Catch  STAT         Ordered BARI LARKIN    09/12/22 1930 09/12/22 1930  POCT " urine pregnancy  Once         Ordered BARI LARKIN    09/12/22 1930 09/12/22 1930  CT Abdomen Pelvis With Contrast  1 time imaging         Ordered BARI LARKIN    09/12/22 1929 09/12/22 1930  Saline lock IV  Once         Ordered BARI LARKIN    09/12/22 1929 09/12/22 1930  CBC auto differential  STAT         Ordered BARI LARKIN    09/12/22 1929 09/12/22 1930  Comprehensive metabolic panel  STAT         Ordered BARI LARKIN    09/12/22 1929 09/12/22 1930  Lipase  STAT         Ordered BARI LARKIN              Virtual Visit Note: The provider triage portion of this emergency department evaluation and documentation was performed via [employer], a HIPAA-compliant telemedicine application, in concert with a tele-presenter in the room. A face to face patient evaluation with one of my colleagues will occur once the patient is placed in an emergency department room.      DISCLAIMER: This note was prepared with [employer] voice recognition transcription software. Garbled syntax, mangled pronouns, and other bizarre constructions may be attributed to that software system.

## 2022-09-13 NOTE — ED NOTES
Patient received in ER after pt had unresolved RLQ abdominal pain. Pain began yesterday, NVD began last two days. Pt reports difficulty keeping foods and liquids down, states no exact time as to when throwing up afterwards occurs. Pt is afebrile upon initial assessment. Pt states she still has her appendix, see note below regarding RLQ pain assessment. Pt aaox4 and speaks in complete sentences. Vss, pt resting in bed. IV site CDI, flushes without resistance. Pt denies pain with lying flat.      Fiance at bedside.      Pain:  Rated 7/10 at rest, pain 10/10 described as sharp when pressing on RLQ and confirms pain when releasing at the site.      Psychosocial:  Patient is calm and cooperative.  Patients insight and judgement are appropriate to situation.  Appears clean, well maintained, with clothing appropriate to environment.  No evidence of delusions, hallucinations, or psychosis.     Neuro:  Eyes open spontaneously.  Awake, alert, oriented x 4.  Speech clear and appropriate.  Tolerating saliva secretions well.  Able to follow commands, demonstrating ability to actively and appropriately communicate within context of current conversation.  Symmetrical facial muscles.  Moving all extremities well with no noted weakness.  Adequate muscle tone present.    Movement is purposeful.       Airway:  Bilateral chest rise and fall.  RR regular and non-labored.  Air entry patent and clear x 5 lobes of the lungs.  No crepitus or subcutaneous emphysema noted on palpation.       Circulatory:  Skin warm, dry, and pink.  Apical and radial pulses strong and regular.  Capillary refill/skin blanching less than 3 seconds to distal of 4 extremities.     Abdomen:  Abdomen soft and non-distended, overweight.  Positive normo-active bowel sounds x 4 quadrants.       Urinary:  Patient denies pain, frequency, or urgency.  Voids independently.  Reports urine appears tee/yellow in color.     Extremities:  No redness, heat, swelling,  deformity, or pain.     Skin:  Intact with no bruising/discolorations noted.

## 2022-09-13 NOTE — DISCHARGE INSTRUCTIONS
Your labs, urine studies, CT scan, and ultrasound do not show any signs of infection or anything that is an emergent cause of your pain. You can take Tylenol 1 gram every 8 hours, and ibuprofen 800 mg every 8 hours; this means you can take pain medicine once every 4 hours.  Please see your primary doctor for follow up.

## 2022-09-13 NOTE — ED NOTES
Pt reports some relief of pain.   States pain 4/10 in RLQ  Pt states she has had hx of cysts on ovaries in the past when she was 15

## 2022-09-13 NOTE — ED PROVIDER NOTES
"Encounter Date: 9/12/2022       History     Chief Complaint   Patient presents with    Abdominal Pain     Patient reports intermittent RLQ cramping pain that started yesterday. Patient states "comes in waves."  Pain does not move. N/V x 2 episodes. Diarrhea last 2 days. Was seen at , [facility]. Was told possible appendicitis. Denies fever. Denies dysuria but states urine has been dark.     HPI  22f with no reported pmh presenting with abdominal pain x 2 days in the right lower quadrant  associated with nausea, vomiting  worse with:  Movement  improved with:  Nothing noted  Denies sick contacts  Denies eating any new or spoiled foods  Endorses history of abdominal surgeries - lap tran 2021  Denies f/c/CP/SOB/urinary sxs/diarrhea    Review of patient's allergies indicates:   Allergen Reactions    Latex, natural rubber Rash    Sulfa (sulfonamide antibiotics) Hives     Past Medical History:   Diagnosis Date    Mental disorder     anxiety and depression    Ovarian cyst     Trauma     patient states she has both desceased parents and suffers from depression and anxiety     Past Surgical History:   Procedure Laterality Date    LAPAROSCOPIC CHOLECYSTECTOMY N/A 7/4/2021    Procedure: CHOLECYSTECTOMY, LAPAROSCOPIC;  Surgeon: Mauricio An MD;  Location: [facility] OR [address] [place] [address];  Service: General;  Laterality: N/A;    TONSILLECTOMY       Family History   Problem Relation Age of Onset    Cancer Father         skin    Hypertension Maternal Grandmother     Diabetes Paternal Grandfather     Amblyopia Neg Hx     Blindness Neg Hx     Cataracts Neg Hx     Glaucoma Neg Hx     Macular degeneration Neg Hx     Retinal detachment Neg Hx      Social History     Tobacco Use    Smoking status: Every Day     Packs/day: 0.50     Types: Cigarettes    Smokeless tobacco: Current   Substance Use Topics    Alcohol use: Yes     Alcohol/week: 0.0 standard drinks     Comment: occationally    Drug use: Yes     Types: Marijuana     Comment: Ectasy     Review " of Systems   Constitutional:  Negative for appetite change, chills, diaphoresis and fever.   HENT:  Negative for congestion, nosebleeds, sore throat, trouble swallowing and voice change.    Eyes:  Negative for photophobia, pain, redness and itching.   Respiratory:  Negative for cough, chest tightness and shortness of breath.    Cardiovascular:  Negative for chest pain and leg swelling.   Gastrointestinal:  Positive for abdominal pain, nausea and vomiting. Negative for constipation and diarrhea.   Genitourinary:  Negative for decreased urine volume, difficulty urinating, dysuria and frequency.   Musculoskeletal:  Negative for gait problem.   Skin:  Negative for color change, rash and wound.   Neurological:  Negative for dizziness, facial asymmetry, speech difficulty and headaches.   Psychiatric/Behavioral:  Negative for agitation, confusion and suicidal ideas.    All other systems reviewed and are negative.    Physical Exam     Initial Vitals [09/12/22 1915]   BP Pulse Resp Temp SpO2   (!) 142/73 (!) 115 17 98.4 °F (36.9 °C) 98 %      MAP       --         Physical Exam    Nursing note and vitals reviewed.  Constitutional:   EXAM  General: Awake, alert and oriented. No acute distress.     Head: normocephalic and atraumatic     Eyes: Conjunctivae are clear without exudates or hemorrhage. Sclera is non-icteric. EOM are intact. Eyelids are normal in appearance without swelling or lesions.     Ears: The external ear and ear canal are non-tender and without swelling. The canal is clear without discharge. Hearing intact.     Nose: Nares are patent bilaterally.     Neck: The neck is supple. Trachea is midline. Full ROM.     Cardiac:  Tachycardic rate.     Respiratory: No signs of respiratory distress. No audible wheezes.     Abdominal: Non-distended.  Right lower quadrant pain with palpation.  Guarding.  Non peritoneal.     Extremities: Upper and lower extremities are atraumatic in appearance without tenderness or deformity.  No swelling or erythema. Full range of motion.     Skin: Appropriate color for ethnicity.     Neurological: The patient is awake, alert and oriented to person, place, and time with normal speech.     Psychiatric: Appropriate mood and affect.     In light of current/ongoing global covid-19 pandemic, all my encounters w pt were with full ppe including but not limited to gown, gloves, n95, eye protection OR from >6 ft away.           ED Course   Procedures  Labs Reviewed   COMPREHENSIVE METABOLIC PANEL - Abnormal; Notable for the following components:       Result Value    Potassium 5.9 (*)     CO2 21 (*)     AST 47 (*)     ALT 68 (*)     All other components within normal limits   URINALYSIS, REFLEX TO URINE CULTURE - Abnormal; Notable for the following components:    Specific Gravity, UA >1.030 (*)     Protein, UA 1+ (*)     Ketones, UA Trace (*)     Urobilinogen, UA 2.0-3.0 (*)     All other components within normal limits    Narrative:     Specimen Source->Urine   CBC W/ AUTO DIFFERENTIAL   LIPASE   URINALYSIS MICROSCOPIC    Narrative:     Specimen Source->Urine   POCT URINE PREGNANCY          Imaging Results              US Pelvis Comp with Transvag NON-OB (xpd) (Final result)  Result time 09/13/22 01:08:35   Procedure changed from US Transvaginal Non OB     Final result by Drew Hernandez DO (09/13/22 01:08:35)                   Impression:      No sonographic abnormality.      Electronically signed by: Drew Hernandez  Date:    09/13/2022  Time:    01:08               Narrative:    EXAMINATION:  US PELVIS COMP WITH TRANSVAG NON-OB (XPD)    CLINICAL HISTORY:  Pain; Pain, unspecified    TECHNIQUE:  Transabdominal sonography of the pelvis was performed, followed by transvaginal sonography to better evaluate the uterus and ovaries.    COMPARISON:  None.    FINDINGS:  Uterus:    Size: 6.9 x 2.7 x 7.9 cm    Masses: None    Endometrium: Normal in this pre menopausal patient, measuring 4 mm.    Right ovary:    Size: 3.6  x 2.9 x 3.7 cm    Appearance: There is a dominant follicle measuring 1.9 x 1.4 x 2.1 cm.    Vascular flow: Normal.    Left ovary:    Size: 2.0 x 3.3 x 1.7 cm    Appearance: Normal    Vascular Flow: Normal.    Free Fluid:    There is mild free fluid in the right adnexa, likely physiologic.                                       CT Abdomen Pelvis With Contrast (Final result)  Result time 09/12/22 22:43:34      Final result by Drew Hernandez DO (09/12/22 22:43:34)                   Impression:      1. No acute abnormality of the abdomen or pelvis.  2. Mild nonspecific patchy ground-glass attenuation in the right lower lobe which may represent atelectasis, aspiration, versus an early infectious process.  3. Hepatomegaly and hepatic steatosis.      Electronically signed by: Drew Hernandez  Date:    09/12/2022  Time:    22:43               Narrative:    EXAMINATION:  CT ABDOMEN PELVIS WITH CONTRAST    CLINICAL HISTORY:  RLQ abdominal pain (Age >= 14y);    TECHNIQUE:  Axial CT images with sagittal and coronal reformats were obtained of the abdomen and pelvis from the hemidiaphragms through the symphysis pubis after the administration of 100mL Omnipaque 350.    COMPARISON:  CT of the abdomen and pelvis from 07/03/2021.    FINDINGS:  Lung Bases: There is mild patchy ground-glass attenuation in the right lower lobe.    Heart: Heart size is normal.  No pericardial effusion.    Liver: The liver is enlarged.  No focal hepatic lesions are seen.  There is diffuse hepatic steatosis.  The portal vasculature is patent.    Biliary tract: No intrahepatic or extrahepatic biliary ductal dilatation.    Gallbladder: Surgically absent.    Pancreas: Normal. No pancreatic ductal dilatation.    Spleen: Normal size without focal lesion.    Adrenals: Unremarkable.    Kidneys and urinary collecting systems: Normal.  No hydronephrosis or urolithiasis.    Lymph nodes: None enlarged.    Stomach and bowel: The stomach is normal.  Loops of small and  large bowel are normal in caliber without evidence for inflammation or obstruction.  The appendix is normal.    Peritoneum and mesentery: No ascites or free intraperitoneal air.  No abdominal fluid collection.    Vasculature: No aneurysm or significant atherosclerosis.    Urinary bladder: No wall thickening.    Reproductive organs: The uterus and adnexae are unremarkable.    Body wall: No abnormality.    Musculoskeletal: No aggressive osseous lesion.                                       Medications   sodium chloride 0.9% bolus 1,000 mL (0 mLs Intravenous Stopped 9/12/22 2151)   ondansetron injection 4 mg (4 mg Intravenous Given 9/12/22 2046)   morphine injection 4 mg (4 mg Intravenous Given 9/12/22 2048)   iohexoL (OMNIPAQUE 350) injection 100 mL (100 mLs Intravenous Given 9/12/22 2211)   morphine injection 4 mg (4 mg Intravenous Given 9/12/22 2205)   metoclopramide HCl injection 5 mg (5 mg Intravenous Given 9/13/22 0001)   oxyCODONE immediate release tablet 5 mg (5 mg Oral Given 9/13/22 0107)     Medical Decision Making:   Clinical Tests:   Lab Tests: Ordered and Reviewed  Radiological Study: Ordered and Reviewed  ED Management:  Patient in significant amount of pain, requiring morphine for pain control.  CT abdomen pelvis does not show any acute abnormalities except possible atelectasis versus infection in the right lung.  However, this is no ordinary the location of the patient's pain.  Will obtain transvaginal ultrasound to evaluate for ovarian pathology.  No leukocytosis or evidence of urinary tract infection. US is wnl. Strict return precautions discussed with patient expressing understanding of instructions, and all questions answered.                           Clinical Impression:   Final diagnoses:  [R52] Pain  [R52] Pain - RLQ pain, vomiting  [R10.30] Lower abdominal pain (Primary)      ED Disposition Condition    Discharge Stable          ED Prescriptions    None       Follow-up Information       Follow  up With Specialties Details Why Contact Info    Elbert Oliveira MD Family Medicine   [address] [facility] [address] [phone]               Ayana Andrea MD  09/13/22 0120

## 2022-10-10 ENCOUNTER — HOSPITAL ENCOUNTER (EMERGENCY)
Facility: HOSPITAL | Age: 22
Discharge: HOME | End: 2022-10-11
Attending: EMERGENCY MEDICINE
Payer: MEDICAID

## 2022-10-10 DIAGNOSIS — R11.2 NAUSEA VOMITING AND DIARRHEA: ICD-10-CM

## 2022-10-10 DIAGNOSIS — R19.7 NAUSEA VOMITING AND DIARRHEA: ICD-10-CM

## 2022-10-10 DIAGNOSIS — R10.13 EPIGASTRIC PAIN: Primary | ICD-10-CM

## 2022-10-10 DIAGNOSIS — R10.9 ABDOMINAL PAIN: ICD-10-CM

## 2022-10-10 PROCEDURE — 96375 TX/PRO/DX INJ NEW DRUG ADDON: CPT

## 2022-10-10 PROCEDURE — 99284 EMERGENCY DEPT VISIT MOD MDM: CPT | Mod: 25

## 2022-10-10 PROCEDURE — 96361 HYDRATE IV INFUSION ADD-ON: CPT

## 2022-10-10 PROCEDURE — 96374 THER/PROPH/DIAG INJ IV PUSH: CPT

## 2022-10-10 PROCEDURE — 81025 URINE PREGNANCY TEST: CPT | Performed by: PHYSICIAN ASSISTANT

## 2022-10-10 RX ORDER — PROCHLORPERAZINE EDISYLATE 5 MG/ML
10 INJECTION INTRAMUSCULAR; INTRAVENOUS
Status: COMPLETED | OUTPATIENT
Start: 2022-10-11 | End: 2022-10-11

## 2022-10-10 RX ORDER — ONDANSETRON 2 MG/ML
4 INJECTION INTRAMUSCULAR; INTRAVENOUS
Status: DISCONTINUED | OUTPATIENT
Start: 2022-10-10 | End: 2022-10-10

## 2022-10-11 VITALS
TEMPERATURE: 99 F | HEART RATE: 89 BPM | BODY MASS INDEX: 41.45 KG/M2 | OXYGEN SATURATION: 98 % | SYSTOLIC BLOOD PRESSURE: 130 MMHG | HEIGHT: 63 IN | RESPIRATION RATE: 19 BRPM | WEIGHT: 233.94 LBS | DIASTOLIC BLOOD PRESSURE: 72 MMHG

## 2022-10-11 LAB
ALBUMIN SERPL BCP-MCNC: 3.9 G/DL (ref 3.5–5.2)
ALP SERPL-CCNC: 79 U/L (ref 55–135)
ALT SERPL W/O P-5'-P-CCNC: 44 U/L (ref 10–44)
ANION GAP SERPL CALC-SCNC: 7 MMOL/L (ref 8–16)
AST SERPL-CCNC: 26 U/L (ref 10–40)
B-HCG UR QL: NEGATIVE
BASOPHILS # BLD AUTO: 0.01 K/UL (ref 0–0.2)
BASOPHILS NFR BLD: 0.1 % (ref 0–1.9)
BILIRUB SERPL-MCNC: 0.7 MG/DL (ref 0.1–1)
BILIRUB UR QL STRIP: NEGATIVE
BUN SERPL-MCNC: 9 MG/DL (ref 6–20)
CALCIUM SERPL-MCNC: 9.2 MG/DL (ref 8.7–10.5)
CHLORIDE SERPL-SCNC: 104 MMOL/L (ref 95–110)
CLARITY UR: ABNORMAL
CO2 SERPL-SCNC: 26 MMOL/L (ref 23–29)
COLOR UR: YELLOW
CREAT SERPL-MCNC: 0.7 MG/DL (ref 0.5–1.4)
CTP QC/QA: YES
DIFFERENTIAL METHOD: NORMAL
EOSINOPHIL # BLD AUTO: 0.1 K/UL (ref 0–0.5)
EOSINOPHIL NFR BLD: 0.8 % (ref 0–8)
ERYTHROCYTE [DISTWIDTH] IN BLOOD BY AUTOMATED COUNT: 12.1 % (ref 11.5–14.5)
EST. GFR  (NO RACE VARIABLE): >60 ML/MIN/1.73 M^2
GLUCOSE SERPL-MCNC: 94 MG/DL (ref 70–110)
GLUCOSE UR QL STRIP: NEGATIVE
HCT VFR BLD AUTO: 43.2 % (ref 37–48.5)
HGB BLD-MCNC: 14.3 G/DL (ref 12–16)
HGB UR QL STRIP: NEGATIVE
IMM GRANULOCYTES # BLD AUTO: 0.03 K/UL (ref 0–0.04)
IMM GRANULOCYTES NFR BLD AUTO: 0.4 % (ref 0–0.5)
KETONES UR QL STRIP: NEGATIVE
LEUKOCYTE ESTERASE UR QL STRIP: NEGATIVE
LIPASE SERPL-CCNC: 8 U/L (ref 4–60)
LYMPHOCYTES # BLD AUTO: 2 K/UL (ref 1–4.8)
LYMPHOCYTES NFR BLD: 24.2 % (ref 18–48)
MCH RBC QN AUTO: 29.1 PG (ref 27–31)
MCHC RBC AUTO-ENTMCNC: 33.1 G/DL (ref 32–36)
MCV RBC AUTO: 88 FL (ref 82–98)
MONOCYTES # BLD AUTO: 0.4 K/UL (ref 0.3–1)
MONOCYTES NFR BLD: 4.5 % (ref 4–15)
NEUTROPHILS # BLD AUTO: 5.9 K/UL (ref 1.8–7.7)
NEUTROPHILS NFR BLD: 70 % (ref 38–73)
NITRITE UR QL STRIP: NEGATIVE
NRBC BLD-RTO: 0 /100 WBC
PH UR STRIP: 6 [PH] (ref 5–8)
PLATELET # BLD AUTO: 245 K/UL (ref 150–450)
PMV BLD AUTO: 10.7 FL (ref 9.2–12.9)
POTASSIUM SERPL-SCNC: 3.5 MMOL/L (ref 3.5–5.1)
PROT SERPL-MCNC: 7.4 G/DL (ref 6–8.4)
PROT UR QL STRIP: ABNORMAL
RBC # BLD AUTO: 4.92 M/UL (ref 4–5.4)
SODIUM SERPL-SCNC: 137 MMOL/L (ref 136–145)
SP GR UR STRIP: 1.02 (ref 1–1.03)
URN SPEC COLLECT METH UR: ABNORMAL
UROBILINOGEN UR STRIP-ACNC: NEGATIVE EU/DL
WBC # BLD AUTO: 8.42 K/UL (ref 3.9–12.7)

## 2022-10-11 PROCEDURE — 83690 ASSAY OF LIPASE: CPT | Performed by: PHYSICIAN ASSISTANT

## 2022-10-11 PROCEDURE — 63600175 PHARM REV CODE 636 W HCPCS: Performed by: EMERGENCY MEDICINE

## 2022-10-11 PROCEDURE — 25000003 PHARM REV CODE 250: Performed by: EMERGENCY MEDICINE

## 2022-10-11 PROCEDURE — 80053 COMPREHEN METABOLIC PANEL: CPT | Performed by: PHYSICIAN ASSISTANT

## 2022-10-11 PROCEDURE — 81003 URINALYSIS AUTO W/O SCOPE: CPT | Performed by: PHYSICIAN ASSISTANT

## 2022-10-11 PROCEDURE — 85025 COMPLETE CBC W/AUTO DIFF WBC: CPT | Performed by: PHYSICIAN ASSISTANT

## 2022-10-11 RX ORDER — KETOROLAC TROMETHAMINE 30 MG/ML
15 INJECTION, SOLUTION INTRAMUSCULAR; INTRAVENOUS
Status: COMPLETED | OUTPATIENT
Start: 2022-10-11 | End: 2022-10-11

## 2022-10-11 RX ORDER — LIDOCAINE HYDROCHLORIDE 20 MG/ML
15 SOLUTION OROPHARYNGEAL ONCE
Status: COMPLETED | OUTPATIENT
Start: 2022-10-11 | End: 2022-10-11

## 2022-10-11 RX ORDER — MAG HYDROX/ALUMINUM HYD/SIMETH 200-200-20
30 SUSPENSION, ORAL (FINAL DOSE FORM) ORAL ONCE
Status: COMPLETED | OUTPATIENT
Start: 2022-10-11 | End: 2022-10-11

## 2022-10-11 RX ORDER — SUCRALFATE 1 G/10ML
1 SUSPENSION ORAL EVERY 6 HOURS PRN
Qty: 414 ML | Refills: 0 | Status: SHIPPED | OUTPATIENT
Start: 2022-10-11 | End: 2022-10-25 | Stop reason: SDUPTHER

## 2022-10-11 RX ORDER — METOCLOPRAMIDE 10 MG/1
10 TABLET ORAL EVERY 6 HOURS PRN
Qty: 14 TABLET | Refills: 0 | Status: SHIPPED | OUTPATIENT
Start: 2022-10-11 | End: 2022-11-23 | Stop reason: ALTCHOICE

## 2022-10-11 RX ADMIN — LIDOCAINE HYDROCHLORIDE 15 ML: 20 SOLUTION ORAL; TOPICAL at 01:10

## 2022-10-11 RX ADMIN — ALUMINUM HYDROXIDE, MAGNESIUM HYDROXIDE, AND SIMETHICONE 30 ML: 200; 200; 20 SUSPENSION ORAL at 01:10

## 2022-10-11 RX ADMIN — PROCHLORPERAZINE EDISYLATE 10 MG: 5 INJECTION INTRAMUSCULAR; INTRAVENOUS at 12:10

## 2022-10-11 RX ADMIN — KETOROLAC TROMETHAMINE 15 MG: 30 INJECTION, SOLUTION INTRAMUSCULAR at 01:10

## 2022-10-11 RX ADMIN — SODIUM CHLORIDE 1000 ML: 0.9 INJECTION, SOLUTION INTRAVENOUS at 12:10

## 2022-10-11 NOTE — DISCHARGE INSTRUCTIONS
Please make sure you are drinking plenty of fluids.  Please call your primary care physician in the morning for a follow-up appointment for further evaluation and management.  Please return with any new or worsening symptoms.

## 2022-10-11 NOTE — FIRST PROVIDER EVALUATION
Emergency Department TeleTriage Encounter Note      CHIEF COMPLAINT    Chief Complaint   Patient presents with    Abdominal Pain    Nausea    Vomiting     Pt c/o epigastric pain/n/v x one day.        VITAL SIGNS   Initial Vitals [10/10/22 2126]   BP Pulse Resp Temp SpO2   126/71 (!) 115 20 98 °F (36.7 °C) 96 %      MAP       --            ALLERGIES    Review of patient's allergies indicates:   Allergen Reactions    Latex, natural rubber Rash    Sulfa (sulfonamide antibiotics) Hives       PROVIDER TRIAGE NOTE  22-year-old female presenting with nausea vomiting and abdominal pain that began yesterday.  Appears well on exam, slightly tachycardic, otherwise no acute distress noted.      ORDERS  Labs Reviewed   CBC W/ AUTO DIFFERENTIAL   COMPREHENSIVE METABOLIC PANEL   LIPASE   URINALYSIS, REFLEX TO URINE CULTURE   POCT URINE PREGNANCY       ED Orders (720h ago, onward)      Start Ordered     Status Ordering Provider    10/10/22 2345 10/10/22 2341  lactated ringers bolus 1,000 mL  ED 1 Time         Ordered THAIS HESS    10/10/22 2345 10/10/22 2341  ondansetron injection 4 mg  ED 1 Time         Ordered THAIS HESS    10/10/22 2342 10/10/22 2341  Saline lock IV  Once         Ordered THAIS HESS    10/10/22 2342 10/10/22 2341  CBC Auto Differential  STAT         Ordered THAIS HESS.    10/10/22 2342 10/10/22 2341  Comprehensive Metabolic Panel  STAT         Ordered THAIS HESS.    10/10/22 2342 10/10/22 2341  Lipase  STAT         Ordered THAIS HESS    10/10/22 2342 10/10/22 2341  Urinalysis, Reflex to Urine Culture  STAT         Ordered THAIS HESS.    10/10/22 2342 10/10/22 2341  POCT urine pregnancy  Once         Ordered THAIS HESS              Virtual Visit Note: The provider triage portion of this emergency department evaluation and documentation was performed via [employer], a HIPAA-compliant telemedicine application, in concert with a tele-presenter in the room. NIKOLE  face to face patient evaluation with one of my colleagues will occur once the patient is placed in an emergency department room.      DISCLAIMER: This note was prepared with [employer] voice recognition transcription software. Garbled syntax, mangled pronouns, and other bizarre constructions may be attributed to that software system.

## 2022-10-11 NOTE — ED PROVIDER NOTES
Encounter Date: 10/10/2022       History     Chief Complaint   Patient presents with    Abdominal Pain    Nausea    Vomiting     Pt c/o epigastric pain/n/v x one day.      22-year-old female presents emergency department complaining of nausea, vomiting, epigastric abdominal pain.  Onset yesterday.  Notes nausea with several episodes of nonbloody, nonbilious emesis today.  States she has been unable to tolerate anything this afternoon and this evening.  Notes several loose bowel movements today as well, nonbloody.  Notes a constant, aching and throbbing and burning epigastric pain.  Worse with vomiting.  No alleviating factors reported.  No other symptoms reported.  Denies any headache, lightheadedness, dizziness, sore throat, cough, congestion, chest pain, shortness of breath, fever.    Review of patient's allergies indicates:   Allergen Reactions    Latex, natural rubber Rash    Sulfa (sulfonamide antibiotics) Hives     Past Medical History:   Diagnosis Date    Mental disorder     anxiety and depression    Ovarian cyst     Trauma     patient states she has both desceased parents and suffers from depression and anxiety     Past Surgical History:   Procedure Laterality Date    LAPAROSCOPIC CHOLECYSTECTOMY N/A 7/4/2021    Procedure: CHOLECYSTECTOMY, LAPAROSCOPIC;  Surgeon: Mauricio An MD;  Location: [facility] OR [address] [place] [address];  Service: General;  Laterality: N/A;    TONSILLECTOMY       Family History   Problem Relation Age of Onset    Cancer Father         skin    Hypertension Maternal Grandmother     Diabetes Paternal Grandfather     Amblyopia Neg Hx     Blindness Neg Hx     Cataracts Neg Hx     Glaucoma Neg Hx     Macular degeneration Neg Hx     Retinal detachment Neg Hx      Social History     Tobacco Use    Smoking status: Every Day     Packs/day: 0.50     Types: Cigarettes    Smokeless tobacco: Current   Substance Use Topics    Alcohol use: Yes     Alcohol/week: 0.0 standard drinks     Comment: occationally    Drug  use: Yes     Types: Marijuana     Comment: Ectasy     Review of Systems   Constitutional:  Negative for chills, fatigue and fever.   HENT:  Negative for congestion and sore throat.    Eyes:  Negative for photophobia and visual disturbance.   Respiratory:  Negative for cough and shortness of breath.    Cardiovascular:  Negative for chest pain and palpitations.   Gastrointestinal:  Positive for abdominal pain, diarrhea and vomiting.   Musculoskeletal:  Negative for back pain, neck pain and neck stiffness.   Neurological:  Negative for light-headedness, numbness and headaches.     Physical Exam     Initial Vitals [10/10/22 2126]   BP Pulse Resp Temp SpO2   126/71 (!) 115 20 98 °F (36.7 °C) 96 %      MAP       --         Physical Exam    Nursing note and vitals reviewed.  Constitutional: She appears well-developed and well-nourished. No distress.   HENT:   Head: Normocephalic and atraumatic.   Eyes: Conjunctivae and EOM are normal. Pupils are equal, round, and reactive to light.   Neck: Neck supple. No tracheal deviation present.   Normal range of motion.  Cardiovascular:  Normal rate and intact distal pulses.           Pulmonary/Chest: No respiratory distress.   Abdominal: Abdomen is soft. She exhibits no distension. There is abdominal tenderness (Epigastric). There is no rebound and no guarding.   Musculoskeletal:         General: No tenderness or edema. Normal range of motion.      Cervical back: Normal range of motion and neck supple.     Neurological: She is alert and oriented to person, place, and time. She has normal strength. No cranial nerve deficit. GCS score is 15. GCS eye subscore is 4. GCS verbal subscore is 5. GCS motor subscore is 6.   Skin: Skin is warm and dry.       ED Course   Procedures  Labs Reviewed   COMPREHENSIVE METABOLIC PANEL - Abnormal; Notable for the following components:       Result Value    Anion Gap 7 (*)     All other components within normal limits   CBC W/ AUTO DIFFERENTIAL   LIPASE    URINALYSIS, REFLEX TO URINE CULTURE   POCT URINE PREGNANCY              X-Rays:   Independently Interpreted Readings:   Other Readings:  Imaging interpreted by radiologist and visualized by me:  Imaging Results              X-Ray Abdomen Flat And Erect (Final result)  Result time 10/11/22 01:24:50      Final result by Drew Hernandez DO (10/11/22 01:24:50)                   Impression:      No acute abnormality.      Electronically signed by: Drew Hernandez  Date:    10/11/2022  Time:    01:24               Narrative:    EXAMINATION:  XR ABDOMEN FLAT AND ERECT    CLINICAL HISTORY:  Unspecified abdominal pain    TECHNIQUE:  Flat and erect AP views of the abdomen were performed.    COMPARISON:  None    FINDINGS:  There is a normal, nonobstructive bowel gas pattern.  There is no free air.  There is no abnormal calcification. The visualized osseous structures are unremarkable. The visualized lung bases are clear.  There are right upper quadrant surgical clips.                                      Medications   prochlorperazine injection Soln 10 mg (10 mg Intravenous Given 10/11/22 0000)   sodium chloride 0.9% bolus 1,000 mL (0 mLs Intravenous Stopped 10/11/22 0100)   ketorolac injection 15 mg (15 mg Intravenous Given 10/11/22 0126)   aluminum-magnesium hydroxide-simethicone 200-200-20 mg/5 mL suspension 30 mL (30 mLs Oral Given 10/11/22 0126)     And   LIDOcaine HCl 2% oral solution 15 mL (15 mLs Oral Given 10/11/22 0126)     Medical Decision Making:   Initial Assessment:   22-year-old female presents emergency department complaining of epigastric pain, nausea, vomiting, diarrhea  Differential Diagnosis:   Diverticulitis, cholecystitis, pancreatitis, appendicitis, obstruction, constipation UTI, pyelonephritis, kidney stone, gastroenteritis, pregnancy, threatened versus inevitable versus missed miscarriage, ectopic  Independently Interpreted Test(s):   I have ordered and independently interpreted X-rays - see prior  notes.  Clinical Tests:   Lab Tests: Reviewed       <> Summary of Lab: Benign  ED Management:  Patient given IV fluid, Compazine, Toradol, GI cocktail.  On re-evaluation, vital signs remained stable, patient tolerating p.o. and feeling much better.  Informed her of results as well as plan to discharge with prescriptions for Carafate and Reglan, instructed on home management, follow up with primary care physician, strict return precautions given.                          Clinical Impression:   Final diagnoses:  [R10.9] Abdominal pain  [R10.13] Epigastric pain (Primary)  [R11.2, R19.7] Nausea vomiting and diarrhea      ED Disposition Condition    Discharge Stable          ED Prescriptions       Medication Sig Dispense Start Date End Date Auth. Provider    metoclopramide HCl (REGLAN) 10 MG tablet Take 1 tablet (10 mg total) by mouth every 6 (six) hours as needed (Nausea). 14 tablet 10/11/2022 -- Anderson Short MD    sucralfate (CARAFATE) 100 mg/mL suspension Take 10 mLs (1 g total) by mouth every 6 (six) hours as needed (Abdominal pain). 414 mL 10/11/2022 -- Anderson Short MD          Follow-up Information       Follow up With Specialties Details Why Contact Info    Elbert Oliveira MD Family Medicine Schedule an appointment as soon as possible for a visit   [address] [place] [id number] [phone]               Anderson Short MD  10/11/22 4671

## 2022-11-23 ENCOUNTER — OFFICE VISIT (OUTPATIENT)
Dept: URGENT CARE | Facility: CLINIC | Age: 22
End: 2022-11-23
Payer: MEDICAID

## 2022-11-23 VITALS
OXYGEN SATURATION: 98 % | RESPIRATION RATE: 18 BRPM | DIASTOLIC BLOOD PRESSURE: 68 MMHG | BODY MASS INDEX: 41.99 KG/M2 | SYSTOLIC BLOOD PRESSURE: 110 MMHG | HEIGHT: 63 IN | TEMPERATURE: 98 F | WEIGHT: 237 LBS | HEART RATE: 80 BPM

## 2022-11-23 DIAGNOSIS — R11.0 NAUSEA: Primary | ICD-10-CM

## 2022-11-23 PROCEDURE — 1160F PR REVIEW ALL MEDS BY PRESCRIBER/CLIN PHARMACIST DOCUMENTED: ICD-10-PCS | Mod: CPTII,S$GLB,,

## 2022-11-23 PROCEDURE — 3078F PR MOST RECENT DIASTOLIC BLOOD PRESSURE < 80 MM HG: ICD-10-PCS | Mod: CPTII,S$GLB,,

## 2022-11-23 PROCEDURE — 1159F PR MEDICATION LIST DOCUMENTED IN MEDICAL RECORD: ICD-10-PCS | Mod: CPTII,S$GLB,,

## 2022-11-23 PROCEDURE — 3078F DIAST BP <80 MM HG: CPT | Mod: CPTII,S$GLB,,

## 2022-11-23 PROCEDURE — 3074F SYST BP LT 130 MM HG: CPT | Mod: CPTII,S$GLB,,

## 2022-11-23 PROCEDURE — 3074F PR MOST RECENT SYSTOLIC BLOOD PRESSURE < 130 MM HG: ICD-10-PCS | Mod: CPTII,S$GLB,,

## 2022-11-23 PROCEDURE — 3008F PR BODY MASS INDEX (BMI) DOCUMENTED: ICD-10-PCS | Mod: CPTII,S$GLB,,

## 2022-11-23 PROCEDURE — 99213 PR OFFICE/OUTPT VISIT, EST, LEVL III, 20-29 MIN: ICD-10-PCS | Mod: S$GLB,,,

## 2022-11-23 PROCEDURE — 1160F RVW MEDS BY RX/DR IN RCRD: CPT | Mod: CPTII,S$GLB,,

## 2022-11-23 PROCEDURE — 1159F MED LIST DOCD IN RCRD: CPT | Mod: CPTII,S$GLB,,

## 2022-11-23 PROCEDURE — 99213 OFFICE O/P EST LOW 20 MIN: CPT | Mod: S$GLB,,,

## 2022-11-23 PROCEDURE — 3008F BODY MASS INDEX DOCD: CPT | Mod: CPTII,S$GLB,,

## 2022-11-23 RX ORDER — ONDANSETRON 4 MG/1
4 TABLET, FILM COATED ORAL EVERY 6 HOURS PRN
Qty: 10 TABLET | Refills: 0 | Status: SHIPPED | OUTPATIENT
Start: 2022-11-23 | End: 2023-07-11 | Stop reason: CLARIF

## 2022-11-23 NOTE — PROGRESS NOTES
"Subjective:       Patient ID: Davida Parsons is a 22 y.o. female.    Vitals:  height is 5' 3" (1.6 m) and weight is 107.5 kg (237 lb). Her temperature is 98.1 °F (36.7 °C). Her blood pressure is 110/68 and her pulse is 80. Her respiration is 18 and oxygen saturation is 98%.     Chief Complaint: Medication Refill    Pt states she has a hx of nausea for PTSD/anxiety she suffers with that causes nausea episodes. She reports that she is out of her zofran 4mg that she takes for nausea and her doctor is out of town. She reports that her PCP refilled all other medications but forgot to fill zofran before he went out of town. Patient denies fever, body aches or chills, denies wheezing or shortness of breath, denies vomiting, diarrhea or abdominal pain, denies chest pain or dizziness.      Medication Refill  This is a new problem. The current episode started in the past 7 days. The problem occurs constantly. The problem has been gradually worsening. Associated symptoms include nausea. Associated symptoms comments: Nausea  .     Gastrointestinal:  Positive for nausea.     Objective:      Physical Exam   Constitutional: She is oriented to person, place, and time. She appears well-developed.   HENT:   Head: Normocephalic and atraumatic.   Ears:   Right Ear: External ear normal.   Left Ear: External ear normal.   Nose: Nose normal.   Mouth/Throat: Mucous membranes are normal.   Eyes: Conjunctivae and lids are normal.   Neck: Trachea normal. Neck supple.   Cardiovascular: Normal rate, regular rhythm and normal heart sounds.   Pulmonary/Chest: Effort normal and breath sounds normal. No respiratory distress.   Abdominal: Normal appearance and bowel sounds are normal. She exhibits no distension and no mass. Soft. There is no abdominal tenderness.   Musculoskeletal: Normal range of motion.         General: Normal range of motion.   Neurological: She is alert and oriented to person, place, and time. She has normal strength.   Skin: " Skin is warm, dry, intact, not diaphoretic and not pale.   Psychiatric: Her speech is normal and behavior is normal. Judgment and thought content normal.   Nursing note and vitals reviewed.      Assessment:       1. Nausea          Plan:         Nausea  -     ondansetron (ZOFRAN) 4 MG tablet; Take 1 tablet (4 mg total) by mouth every 6 (six) hours as needed for Nausea (nausea).  Dispense: 10 tablet; Refill: 0       Medical Decision Making:   Urgent Care Management:  Prescriptions sent to pharmacy for nausea. Discussed if she needs further refills she needs to F/U with PCP.

## 2022-11-23 NOTE — PROGRESS NOTES
Subjective:       Patient ID: Davida Parsons is a 22 y.o. female.    Chief Complaint: No chief complaint on file.    HPI  ROS     Objective:      Physical Exam    Assessment:       No diagnosis found.    Plan:                   No follow-ups on file.

## 2022-12-10 PROCEDURE — 99284 EMERGENCY DEPT VISIT MOD MDM: CPT

## 2022-12-11 ENCOUNTER — HOSPITAL ENCOUNTER (EMERGENCY)
Facility: HOSPITAL | Age: 22
Discharge: HOME | End: 2022-12-11
Attending: STUDENT IN AN ORGANIZED HEALTH CARE EDUCATION/TRAINING PROGRAM
Payer: MEDICAID

## 2022-12-11 VITALS
BODY MASS INDEX: 42.51 KG/M2 | SYSTOLIC BLOOD PRESSURE: 128 MMHG | RESPIRATION RATE: 17 BRPM | DIASTOLIC BLOOD PRESSURE: 82 MMHG | WEIGHT: 240 LBS | HEART RATE: 110 BPM | OXYGEN SATURATION: 100 % | TEMPERATURE: 98 F

## 2022-12-11 DIAGNOSIS — L03.316 CELLULITIS OF UMBILICUS: Primary | ICD-10-CM

## 2022-12-11 LAB
B-HCG UR QL: NEGATIVE
BILIRUB UR QL STRIP: NEGATIVE
CLARITY UR: CLEAR
COLOR UR: YELLOW
CTP QC/QA: YES
GLUCOSE UR QL STRIP: NEGATIVE
HGB UR QL STRIP: NEGATIVE
KETONES UR QL STRIP: ABNORMAL
LEUKOCYTE ESTERASE UR QL STRIP: NEGATIVE
NITRITE UR QL STRIP: NEGATIVE
PH UR STRIP: 6 [PH] (ref 5–8)
PROT UR QL STRIP: ABNORMAL
SP GR UR STRIP: 1.03 (ref 1–1.03)
URN SPEC COLLECT METH UR: ABNORMAL
UROBILINOGEN UR STRIP-ACNC: ABNORMAL EU/DL

## 2022-12-11 PROCEDURE — 81003 URINALYSIS AUTO W/O SCOPE: CPT | Performed by: STUDENT IN AN ORGANIZED HEALTH CARE EDUCATION/TRAINING PROGRAM

## 2022-12-11 PROCEDURE — 81025 URINE PREGNANCY TEST: CPT | Performed by: STUDENT IN AN ORGANIZED HEALTH CARE EDUCATION/TRAINING PROGRAM

## 2022-12-11 RX ORDER — CLINDAMYCIN HYDROCHLORIDE 150 MG/1
450 CAPSULE ORAL EVERY 8 HOURS
Qty: 45 CAPSULE | Refills: 0 | Status: SHIPPED | OUTPATIENT
Start: 2022-12-11 | End: 2022-12-16

## 2022-12-11 NOTE — DISCHARGE INSTRUCTIONS
Thank you for coming to our Emergency Department today. It is important to remember that some problems are difficult to diagnose and may not be found during your first visit. Be sure to follow up with your primary care doctor and review any labs/imaging that was performed with them. If you do not have a primary care doctor, you may contact the one listed on your discharge paperwork or you may also call the [facility] Appointment Desk at [phone] to schedule an appointment with one.     All medications may potentially have side effects and it is impossible to predict which medications may give you side effects. If you feel that you are having a negative effect of any medication you should immediately stop taking them and seek medical attention.    Return to the ER with any questions/concerns, new/concerning symptoms, worsening or failure to improve. Do not drive or make any important decisions for 24 hours if you have received any pain medications, sedatives or mood altering drugs during your ER visit.

## 2022-12-11 NOTE — ED PROVIDER NOTES
Encounter Date: 12/10/2022       History     Chief Complaint   Patient presents with    Abdominal Pain     Patient reports green discharge noticed inside belly button with foul smell. Has been seeing daily for 1 week when she goes to take shower. States discharge has only been inside belly button. Has not been enough to leak out. Abdominal pain located around umbilicus for 3-4 days. States she had gallbladder surgery in 2021. Denies fever.    Abscess     Reports vaginal abscess x 1 week     22-year-old female with past medical history of anxiety and depression presents with leakage of yellowish-green pus from her umbilicus for the past week.  Reports drainage as being foul-smelling.  She reports at times having minimal abdominal pain around the region.  Denies any prior episodes or trauma.  Denies any fevers or chills.  Denies any constipation or diarrhea.  Denies any pruritus.  Furthermore she wanted be evaluated for abscess has been draining on her left labia for the past week she denies any pain in the region and reports feeling nodule and wanted it evaluated.  Denies any vaginal discharge or vaginal bleeding.  Denies any dysuria or hematuria.  Denies any prior episodes.      Review of patient's allergies indicates:   Allergen Reactions    Latex, natural rubber Rash    Sulfa (sulfonamide antibiotics) Hives     Past Medical History:   Diagnosis Date    Mental disorder     anxiety and depression    Ovarian cyst     Trauma     patient states she has both desceased parents and suffers from depression and anxiety     Past Surgical History:   Procedure Laterality Date    LAPAROSCOPIC CHOLECYSTECTOMY N/A 7/4/2021    Procedure: CHOLECYSTECTOMY, LAPAROSCOPIC;  Surgeon: Mauricio An MD;  Location: [facility] OR [address] [place] [address];  Service: General;  Laterality: N/A;    TONSILLECTOMY       Family History   Problem Relation Age of Onset    Cancer Father         skin    Hypertension Maternal Grandmother     Diabetes Paternal Grandfather      Amblyopia Neg Hx     Blindness Neg Hx     Cataracts Neg Hx     Glaucoma Neg Hx     Macular degeneration Neg Hx     Retinal detachment Neg Hx      Social History     Tobacco Use    Smoking status: Every Day     Packs/day: 0.50     Types: Cigarettes    Smokeless tobacco: Current   Substance Use Topics    Alcohol use: Yes     Alcohol/week: 0.0 standard drinks     Comment: occationally    Drug use: Yes     Types: Marijuana     Comment: Ectasy     Review of Systems   Constitutional:  Negative for chills and fever.   HENT:  Negative for congestion and rhinorrhea.    Eyes:  Negative for pain.   Respiratory:  Negative for cough and shortness of breath.    Cardiovascular:  Negative for chest pain and leg swelling.   Gastrointestinal:  Positive for abdominal pain. Negative for nausea and vomiting.   Endocrine: Negative for polyuria.   Genitourinary:  Negative for dysuria and hematuria.   Musculoskeletal:  Negative for gait problem and neck pain.   Skin:  Negative for rash.   Allergic/Immunologic: Negative for immunocompromised state.   Neurological:  Negative for weakness and headaches.     Physical Exam     Initial Vitals [12/11/22 0016]   BP Pulse Resp Temp SpO2   128/82 110 17 98.3 °F (36.8 °C) 100 %      MAP       --         Physical Exam    Nursing note and vitals reviewed.  Constitutional: She appears well-developed and well-nourished. She is not diaphoretic. She is Obese . No distress.   HENT:   Head: Normocephalic and atraumatic.   Eyes: Conjunctivae and EOM are normal. Pupils are equal, round, and reactive to light.   Neck: No JVD present.   Normal range of motion.  Cardiovascular:  Normal rate and regular rhythm.           Pulmonary/Chest: Breath sounds normal. No respiratory distress.   Abdominal: Abdomen is soft. Bowel sounds are normal. She exhibits no distension. There is no abdominal tenderness. There is no rebound and no guarding.   Genitourinary:       Musculoskeletal:         General: No tenderness or  edema. Normal range of motion.      Cervical back: Normal range of motion.     Lymphadenopathy:     She has no cervical adenopathy.   Neurological: She is alert and oriented to person, place, and time.   Skin: Skin is warm. Capillary refill takes less than 2 seconds.   Psychiatric: She has a normal mood and affect.       ED Course   Procedures  Labs Reviewed   URINALYSIS - Abnormal; Notable for the following components:       Result Value    Protein, UA Trace (*)     Ketones, UA Trace (*)     Urobilinogen, UA 2.0-3.0 (*)     All other components within normal limits   POCT URINE PREGNANCY          Imaging Results    None          Medications - No data to display  Medical Decision Making:   Initial Assessment:   22-year-old female history of anxiety and depression presents with foul-smelling drainage for umbilicus and evaluation for possible abscess to the left labia.  Patient in no acute distress vitals within normal limits.  Abdomen without any erythema.  Attempted to assess umbilicus with Q-tip was not able to express any fluid or observe any erythema.  Small subcentimeter nodule in left upper labia likely for a previous abscess that has drained.  No surrounding erythema.  Given patient's description foul-smelling drainage from the umbilicus concern possible cellulitis will empirically treat with clindamycin and have patient follow-up with primary care doctor for reassessment.  Currently low suspicion for any intra-abdominal pathology as the cause of his symptoms.  And no systemic signs of infection.  Return precautions and anticipatory guidance given all questions answered.               This dictation has been generated using M-Modal Fluency Direct dictation; some phonetic errors may occur.          Clinical Impression:   Final diagnoses:  [L03.316] Cellulitis of umbilicus (Primary)      ED Disposition Condition    Discharge Stable          ED Prescriptions       Medication Sig Dispense Start Date End Date Auth.  Provider    clindamycin (CLEOCIN) 150 MG capsule Take 3 capsules (450 mg total) by mouth every 8 (eight) hours. for 5 days 45 capsule 12/11/2022 12/16/2022 Liss Leal MD          Follow-up Information       Follow up With Specialties Details Why Contact Info    Elbert Oliveira MD [facility] Medicine  For wound re-check [address] [facility] [address] [phone]      [facility] Emergency Dept Emergency Medicine  If symptoms worsen [address] [facility] [address] [phone]             Liss Leal MD  12/11/22 1952

## 2023-02-14 ENCOUNTER — HOSPITAL ENCOUNTER (EMERGENCY)
Facility: HOSPITAL | Age: 23
Discharge: HOME | End: 2023-02-14
Attending: EMERGENCY MEDICINE
Payer: MEDICAID

## 2023-02-14 VITALS
HEIGHT: 63 IN | HEART RATE: 110 BPM | SYSTOLIC BLOOD PRESSURE: 130 MMHG | RESPIRATION RATE: 20 BRPM | OXYGEN SATURATION: 98 % | DIASTOLIC BLOOD PRESSURE: 72 MMHG | BODY MASS INDEX: 41.24 KG/M2 | TEMPERATURE: 99 F

## 2023-02-14 DIAGNOSIS — R51.9 ACUTE NONINTRACTABLE HEADACHE, UNSPECIFIED HEADACHE TYPE: Primary | ICD-10-CM

## 2023-02-14 LAB
B-HCG UR QL: NEGATIVE
CTP QC/QA: YES

## 2023-02-14 PROCEDURE — 81025 URINE PREGNANCY TEST: CPT | Performed by: PHYSICIAN ASSISTANT

## 2023-02-14 PROCEDURE — 63600175 PHARM REV CODE 636 W HCPCS: Performed by: PHYSICIAN ASSISTANT

## 2023-02-14 PROCEDURE — 96375 TX/PRO/DX INJ NEW DRUG ADDON: CPT

## 2023-02-14 PROCEDURE — 99285 EMERGENCY DEPT VISIT HI MDM: CPT | Mod: 25

## 2023-02-14 PROCEDURE — 96361 HYDRATE IV INFUSION ADD-ON: CPT

## 2023-02-14 PROCEDURE — 25000003 PHARM REV CODE 250: Performed by: PHYSICIAN ASSISTANT

## 2023-02-14 PROCEDURE — 96374 THER/PROPH/DIAG INJ IV PUSH: CPT

## 2023-02-14 RX ORDER — BUTALBITAL, ACETAMINOPHEN AND CAFFEINE 50; 325; 40 MG/1; MG/1; MG/1
1 TABLET ORAL
Status: COMPLETED | OUTPATIENT
Start: 2023-02-14 | End: 2023-02-14

## 2023-02-14 RX ORDER — BUTALBITAL, ACETAMINOPHEN AND CAFFEINE 50; 325; 40 MG/1; MG/1; MG/1
1 TABLET ORAL EVERY 6 HOURS PRN
Qty: 30 TABLET | Refills: 0 | OUTPATIENT
Start: 2023-02-14 | End: 2023-03-19

## 2023-02-14 RX ORDER — IBUPROFEN 800 MG/1
800 TABLET ORAL EVERY 6 HOURS PRN
Qty: 20 TABLET | Refills: 0 | Status: SHIPPED | OUTPATIENT
Start: 2023-02-14 | End: 2023-04-13 | Stop reason: SDUPTHER

## 2023-02-14 RX ORDER — DIPHENHYDRAMINE HYDROCHLORIDE 50 MG/ML
50 INJECTION INTRAMUSCULAR; INTRAVENOUS
Status: COMPLETED | OUTPATIENT
Start: 2023-02-14 | End: 2023-02-14

## 2023-02-14 RX ORDER — METOCLOPRAMIDE HYDROCHLORIDE 5 MG/ML
10 INJECTION INTRAMUSCULAR; INTRAVENOUS
Status: COMPLETED | OUTPATIENT
Start: 2023-02-14 | End: 2023-02-14

## 2023-02-14 RX ORDER — KETOROLAC TROMETHAMINE 30 MG/ML
15 INJECTION, SOLUTION INTRAMUSCULAR; INTRAVENOUS
Status: COMPLETED | OUTPATIENT
Start: 2023-02-14 | End: 2023-02-14

## 2023-02-14 RX ADMIN — BUTALBITAL, ACETAMINOPHEN, AND CAFFEINE 1 TABLET: 50; 325; 40 TABLET ORAL at 03:02

## 2023-02-14 RX ADMIN — KETOROLAC TROMETHAMINE 15 MG: 30 INJECTION, SOLUTION INTRAMUSCULAR; INTRAVENOUS at 04:02

## 2023-02-14 RX ADMIN — DIPHENHYDRAMINE HYDROCHLORIDE 50 MG: 50 INJECTION INTRAMUSCULAR; INTRAVENOUS at 03:02

## 2023-02-14 RX ADMIN — METOCLOPRAMIDE 10 MG: 5 INJECTION, SOLUTION INTRAMUSCULAR; INTRAVENOUS at 03:02

## 2023-02-14 RX ADMIN — SODIUM CHLORIDE 1000 ML: 0.9 INJECTION, SOLUTION INTRAVENOUS at 03:02

## 2023-02-14 NOTE — DISCHARGE INSTRUCTIONS
Please follow-up with family doctor.  Stay hydrated at home.  Take medication as prescribed.  Return to the ER if your symptoms persist or worsen.

## 2023-02-14 NOTE — FIRST PROVIDER EVALUATION
Emergency Department TeleTriage Encounter Note      CHIEF COMPLAINT    Chief Complaint   Patient presents with    Headache     Right sided HA that began yesterday no relief from otc meds, denies any vision changes,+ nausea        VITAL SIGNS   Initial Vitals [02/14/23 1345]   BP Pulse Resp Temp SpO2   130/72 110 20 98.8 °F (37.1 °C) 98 %      MAP       --            ALLERGIES    Review of patient's allergies indicates:   Allergen Reactions    Latex, natural rubber Rash    Sulfa (sulfonamide antibiotics) Hives       PROVIDER TRIAGE NOTE  Patient reports a small intracranial hemorrhage in 2016 that resolved. She is presenting with a sudden onset severe right temporal headache that started yesterday. It has been waxing and waning, but has not resolved with OTC treatment. No head injury. No changes in vision, speech or hearing.       ORDERS  Labs Reviewed - No data to display    ED Orders (720h ago, onward)      None              Virtual Visit Note: The provider triage portion of this emergency department evaluation and documentation was performed via [employer], a HIPAA-compliant telemedicine application, in concert with a tele-presenter in the room. A face to face patient evaluation with one of my colleagues will occur once the patient is placed in an emergency department room.      DISCLAIMER: This note was prepared with [employer] voice recognition transcription software. Garbled syntax, mangled pronouns, and other bizarre constructions may be attributed to that software system.

## 2023-02-14 NOTE — Clinical Note
"Davida Learyia" Issa was seen and treated in our emergency department on 2/14/2023.  She may return to work on 02/15/2023.       If you have any questions or concerns, please don't hesitate to call.      Stormy Connors PA-C"
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
## 2023-02-15 ENCOUNTER — PATIENT OUTREACH (OUTPATIENT)
Dept: EMERGENCY MEDICINE | Facility: HOSPITAL | Age: 23
End: 2023-02-15
Payer: MEDICAID

## 2023-02-24 ENCOUNTER — HOSPITAL ENCOUNTER (EMERGENCY)
Facility: HOSPITAL | Age: 23
Discharge: HOME | End: 2023-02-24
Attending: STUDENT IN AN ORGANIZED HEALTH CARE EDUCATION/TRAINING PROGRAM
Payer: MEDICAID

## 2023-02-24 VITALS
RESPIRATION RATE: 20 BRPM | OXYGEN SATURATION: 97 % | TEMPERATURE: 99 F | DIASTOLIC BLOOD PRESSURE: 75 MMHG | HEART RATE: 103 BPM | SYSTOLIC BLOOD PRESSURE: 127 MMHG | BODY MASS INDEX: 37.2 KG/M2 | WEIGHT: 210 LBS

## 2023-02-24 DIAGNOSIS — R51.9 NEW ONSET OF HEADACHES: Primary | ICD-10-CM

## 2023-02-24 LAB
ALBUMIN SERPL BCP-MCNC: 4 G/DL (ref 3.5–5.2)
ALP SERPL-CCNC: 78 U/L (ref 55–135)
ALT SERPL W/O P-5'-P-CCNC: 34 U/L (ref 10–44)
ANION GAP SERPL CALC-SCNC: 10 MMOL/L (ref 8–16)
AST SERPL-CCNC: 21 U/L (ref 10–40)
B-HCG UR QL: NEGATIVE
BASOPHILS # BLD AUTO: 0.03 K/UL (ref 0–0.2)
BASOPHILS NFR BLD: 0.3 % (ref 0–1.9)
BILIRUB SERPL-MCNC: 0.2 MG/DL (ref 0.1–1)
BUN SERPL-MCNC: 7 MG/DL (ref 6–20)
CALCIUM SERPL-MCNC: 9.3 MG/DL (ref 8.7–10.5)
CHLORIDE SERPL-SCNC: 105 MMOL/L (ref 95–110)
CO2 SERPL-SCNC: 24 MMOL/L (ref 23–29)
CREAT SERPL-MCNC: 0.6 MG/DL (ref 0.5–1.4)
CTP QC/QA: YES
DIFFERENTIAL METHOD: NORMAL
EOSINOPHIL # BLD AUTO: 0.1 K/UL (ref 0–0.5)
EOSINOPHIL NFR BLD: 1.3 % (ref 0–8)
ERYTHROCYTE [DISTWIDTH] IN BLOOD BY AUTOMATED COUNT: 12.3 % (ref 11.5–14.5)
EST. GFR  (NO RACE VARIABLE): >60 ML/MIN/1.73 M^2
GLUCOSE SERPL-MCNC: 83 MG/DL (ref 70–110)
HCT VFR BLD AUTO: 41.1 % (ref 37–48.5)
HGB BLD-MCNC: 13.9 G/DL (ref 12–16)
IMM GRANULOCYTES # BLD AUTO: 0.03 K/UL (ref 0–0.04)
IMM GRANULOCYTES NFR BLD AUTO: 0.3 % (ref 0–0.5)
LYMPHOCYTES # BLD AUTO: 3.3 K/UL (ref 1–4.8)
LYMPHOCYTES NFR BLD: 31.4 % (ref 18–48)
MCH RBC QN AUTO: 29.1 PG (ref 27–31)
MCHC RBC AUTO-ENTMCNC: 33.8 G/DL (ref 32–36)
MCV RBC AUTO: 86 FL (ref 82–98)
MONOCYTES # BLD AUTO: 0.6 K/UL (ref 0.3–1)
MONOCYTES NFR BLD: 5.9 % (ref 4–15)
NEUTROPHILS # BLD AUTO: 6.4 K/UL (ref 1.8–7.7)
NEUTROPHILS NFR BLD: 60.8 % (ref 38–73)
NRBC BLD-RTO: 0 /100 WBC
PLATELET # BLD AUTO: 282 K/UL (ref 150–450)
PMV BLD AUTO: 10.4 FL (ref 9.2–12.9)
POTASSIUM SERPL-SCNC: 4 MMOL/L (ref 3.5–5.1)
PROT SERPL-MCNC: 7.3 G/DL (ref 6–8.4)
RBC # BLD AUTO: 4.78 M/UL (ref 4–5.4)
SODIUM SERPL-SCNC: 139 MMOL/L (ref 136–145)
WBC # BLD AUTO: 10.58 K/UL (ref 3.9–12.7)

## 2023-02-24 PROCEDURE — 96375 TX/PRO/DX INJ NEW DRUG ADDON: CPT

## 2023-02-24 PROCEDURE — 25000003 PHARM REV CODE 250

## 2023-02-24 PROCEDURE — 85025 COMPLETE CBC W/AUTO DIFF WBC: CPT | Performed by: STUDENT IN AN ORGANIZED HEALTH CARE EDUCATION/TRAINING PROGRAM

## 2023-02-24 PROCEDURE — 96374 THER/PROPH/DIAG INJ IV PUSH: CPT

## 2023-02-24 PROCEDURE — 63600175 PHARM REV CODE 636 W HCPCS

## 2023-02-24 PROCEDURE — 81025 URINE PREGNANCY TEST: CPT

## 2023-02-24 PROCEDURE — 99284 EMERGENCY DEPT VISIT MOD MDM: CPT | Mod: 25

## 2023-02-24 PROCEDURE — 80053 COMPREHEN METABOLIC PANEL: CPT | Performed by: STUDENT IN AN ORGANIZED HEALTH CARE EDUCATION/TRAINING PROGRAM

## 2023-02-24 RX ORDER — PROCHLORPERAZINE EDISYLATE 5 MG/ML
10 INJECTION INTRAMUSCULAR; INTRAVENOUS
Status: COMPLETED | OUTPATIENT
Start: 2023-02-24 | End: 2023-02-24

## 2023-02-24 RX ORDER — DIPHENHYDRAMINE HYDROCHLORIDE 50 MG/ML
25 INJECTION INTRAMUSCULAR; INTRAVENOUS
Status: COMPLETED | OUTPATIENT
Start: 2023-02-24 | End: 2023-02-24

## 2023-02-24 RX ORDER — SODIUM CHLORIDE 9 MG/ML
500 INJECTION, SOLUTION INTRAVENOUS
Status: COMPLETED | OUTPATIENT
Start: 2023-02-24 | End: 2023-02-24

## 2023-02-24 RX ADMIN — DIPHENHYDRAMINE HYDROCHLORIDE 25 MG: 50 INJECTION INTRAMUSCULAR; INTRAVENOUS at 01:02

## 2023-02-24 RX ADMIN — PROCHLORPERAZINE EDISYLATE 10 MG: 5 INJECTION INTRAMUSCULAR; INTRAVENOUS at 01:02

## 2023-02-24 RX ADMIN — SODIUM CHLORIDE 500 ML: 0.9 INJECTION, SOLUTION INTRAVENOUS at 01:02

## 2023-02-24 NOTE — Clinical Note
"Davida"Ahsan Parsons was seen and treated in our emergency department on 2/24/2023.  She may return to work on 02/27/2023.       If you have any questions or concerns, please don't hesitate to call.      Elaine Yusuf PA-C"
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
## 2023-02-24 NOTE — ED NOTES
Present to ED with headache and dizziness x 2 day. Report visual changes. + nausea. Denies vomiting. Denies sick contact. Denies photophobia. Took Fioricet with no relief. Denies HX of migraines.

## 2023-03-06 ENCOUNTER — OFFICE VISIT (OUTPATIENT)
Dept: OBSTETRICS AND GYNECOLOGY | Facility: CLINIC | Age: 23
End: 2023-03-06
Payer: MEDICAID

## 2023-03-06 VITALS — SYSTOLIC BLOOD PRESSURE: 122 MMHG | DIASTOLIC BLOOD PRESSURE: 84 MMHG

## 2023-03-06 DIAGNOSIS — Z97.5 BREAKTHROUGH BLEEDING ON NEXPLANON: Primary | ICD-10-CM

## 2023-03-06 DIAGNOSIS — Z11.3 SCREEN FOR STD (SEXUALLY TRANSMITTED DISEASE): ICD-10-CM

## 2023-03-06 DIAGNOSIS — N92.1 BREAKTHROUGH BLEEDING ON NEXPLANON: Primary | ICD-10-CM

## 2023-03-06 DIAGNOSIS — Z01.419 ROUTINE GYNECOLOGICAL EXAMINATION: ICD-10-CM

## 2023-03-06 PROCEDURE — 99213 OFFICE O/P EST LOW 20 MIN: CPT | Mod: PBBFAC | Performed by: OBSTETRICS & GYNECOLOGY

## 2023-03-06 PROCEDURE — 87591 N.GONORRHOEAE DNA AMP PROB: CPT | Performed by: OBSTETRICS & GYNECOLOGY

## 2023-03-06 PROCEDURE — 1159F MED LIST DOCD IN RCRD: CPT | Mod: CPTII,,, | Performed by: OBSTETRICS & GYNECOLOGY

## 2023-03-06 PROCEDURE — 99999 PR PBB SHADOW E&M-EST. PATIENT-LVL III: ICD-10-PCS | Mod: PBBFAC,,, | Performed by: OBSTETRICS & GYNECOLOGY

## 2023-03-06 PROCEDURE — 3079F DIAST BP 80-89 MM HG: CPT | Mod: CPTII,,, | Performed by: OBSTETRICS & GYNECOLOGY

## 2023-03-06 PROCEDURE — 3074F PR MOST RECENT SYSTOLIC BLOOD PRESSURE < 130 MM HG: ICD-10-PCS | Mod: CPTII,,, | Performed by: OBSTETRICS & GYNECOLOGY

## 2023-03-06 PROCEDURE — 1159F PR MEDICATION LIST DOCUMENTED IN MEDICAL RECORD: ICD-10-PCS | Mod: CPTII,,, | Performed by: OBSTETRICS & GYNECOLOGY

## 2023-03-06 PROCEDURE — 99395 PREV VISIT EST AGE 18-39: CPT | Mod: S$PBB,,, | Performed by: OBSTETRICS & GYNECOLOGY

## 2023-03-06 PROCEDURE — 3074F SYST BP LT 130 MM HG: CPT | Mod: CPTII,,, | Performed by: OBSTETRICS & GYNECOLOGY

## 2023-03-06 PROCEDURE — 3079F PR MOST RECENT DIASTOLIC BLOOD PRESSURE 80-89 MM HG: ICD-10-PCS | Mod: CPTII,,, | Performed by: OBSTETRICS & GYNECOLOGY

## 2023-03-06 PROCEDURE — 99395 PR PREVENTIVE VISIT,EST,18-39: ICD-10-PCS | Mod: S$PBB,,, | Performed by: OBSTETRICS & GYNECOLOGY

## 2023-03-06 PROCEDURE — 99999 PR PBB SHADOW E&M-EST. PATIENT-LVL III: CPT | Mod: PBBFAC,,, | Performed by: OBSTETRICS & GYNECOLOGY

## 2023-03-06 RX ORDER — ESTRADIOL 2 MG/1
2 TABLET ORAL DAILY
Qty: 21 TABLET | Refills: 0 | Status: SHIPPED | OUTPATIENT
Start: 2023-03-06 | End: 2023-06-20

## 2023-03-06 NOTE — PROGRESS NOTES
Subjective:       Patient ID: Davida Parsons is a 22 y.o. female.    Chief Complaint:  Vaginal Bleeding      History of Present Illness  HPI  Annual Exam-Premenopausal  Patient presents for annual exam. The patient has no complaints today. The patient is sexually active. GYN screening history: last pap: approximate date 2021 and was normal. The patient wears seatbelts: yes. The patient participates in regular exercise: no. Has the patient ever been transfused or tattooed?: yes. The patient reports that there is not domestic violence in her life.    Pt c/o 1 month spotting with her nexplanon.  Device placed 8/2020.    GYN & OB History  No LMP recorded. Patient has had an implant.   Date of Last Pap: No result found    OB History   No obstetric history on file.       Review of Systems  Review of Systems   Constitutional: Negative.    Respiratory: Negative.     Cardiovascular: Negative.    Gastrointestinal: Negative.    Endocrine: Negative.    Genitourinary: Negative.    Musculoskeletal: Negative.    Neurological: Negative.    Hematological: Negative.    Psychiatric/Behavioral: Negative.     Breast: negative.          Objective:    Physical Exam:   Constitutional: She is oriented to person, place, and time. She appears well-developed and well-nourished. No distress.    HENT:   Head: Normocephalic and atraumatic.       Pulmonary/Chest: Effort normal. No respiratory distress.        Abdominal: Soft. She exhibits no distension and no mass. There is no abdominal tenderness. There is no rebound and no guarding.     Genitourinary:    Vagina, uterus, right adnexa and left adnexa normal.      Pelvic exam was performed with patient supine.   The external female genitalia was normal.   No external genitalia lesions identified,Genitalia hair distrobution normal .   Labial bartholins normal.There is no rash, tenderness, lesion or injury on the right labia. There is no rash, tenderness, lesion or injury on the left labia. Cervix  is normal. Right adnexum displays no mass, no tenderness and no fullness. Left adnexum displays no mass, no tenderness and no fullness. Vagina exhibits no lesion. No erythema,  no vaginal discharge, tenderness, bleeding, rectocele, cystocele or unspecified prolapse of vaginal walls in the vagina.    No foreign body in the vagina.      No signs of injury in the vagina.   Vagina was moist.Cervix exhibits no motion tenderness, no lesion, no discharge, no friability, no lesion, no tenderness and no polyp. Uterus consistancy normal. and Uerus contour normal  Uterus is not deviated, not enlarged, not fixed, not tender, not hosting fibroids and no uterine prolapse. Normal urethral meatus.Urethral Meatus exhibits: urethral lesion and prolapsedUrethra findings: no urethral mass, no tenderness and no urethral scarringBladder findings: no bladder distention and no bladder tenderness          Musculoskeletal: Normal range of motion and moves all extremeties. No tenderness.       Neurological: She is alert and oriented to person, place, and time. No cranial nerve deficit. Coordination normal.    Skin: She is not diaphoretic.    Psychiatric: She has a normal mood and affect. Her behavior is normal. Judgment and thought content normal.        Assessment:        1. Breakthrough bleeding on Nexplanon    2. Screen for STD (sexually transmitted disease)    3. Routine gynecological examination               Plan:       Rx estrace 2 mg x 21 d  GC/CT collected  F/u 8/2023 for nexplanon removal and re-insertion

## 2023-03-08 LAB
C TRACH DNA SPEC QL NAA+PROBE: NOT DETECTED
N GONORRHOEA DNA SPEC QL NAA+PROBE: NOT DETECTED

## 2023-03-19 ENCOUNTER — HOSPITAL ENCOUNTER (EMERGENCY)
Facility: HOSPITAL | Age: 23
Discharge: HOME | End: 2023-03-19
Attending: EMERGENCY MEDICINE
Payer: MEDICAID

## 2023-03-19 VITALS
OXYGEN SATURATION: 99 % | RESPIRATION RATE: 18 BRPM | HEART RATE: 100 BPM | TEMPERATURE: 98 F | DIASTOLIC BLOOD PRESSURE: 74 MMHG | SYSTOLIC BLOOD PRESSURE: 138 MMHG

## 2023-03-19 DIAGNOSIS — R51.9 NONINTRACTABLE HEADACHE, UNSPECIFIED CHRONICITY PATTERN, UNSPECIFIED HEADACHE TYPE: Primary | ICD-10-CM

## 2023-03-19 LAB
B-HCG UR QL: NEGATIVE
BACTERIA #/AREA URNS HPF: ABNORMAL /HPF
BILIRUB UR QL STRIP: NEGATIVE
CLARITY UR: CLEAR
COLOR UR: COLORLESS
CTP QC/QA: YES
GLUCOSE UR QL STRIP: NEGATIVE
HGB UR QL STRIP: ABNORMAL
KETONES UR QL STRIP: NEGATIVE
LEUKOCYTE ESTERASE UR QL STRIP: NEGATIVE
MICROSCOPIC COMMENT: ABNORMAL
NITRITE UR QL STRIP: NEGATIVE
PH UR STRIP: 7 [PH] (ref 5–8)
PROT UR QL STRIP: NEGATIVE
RBC #/AREA URNS HPF: 6 /HPF (ref 0–4)
SP GR UR STRIP: 1.01 (ref 1–1.03)
SQUAMOUS #/AREA URNS HPF: 3 /HPF
URN SPEC COLLECT METH UR: ABNORMAL
UROBILINOGEN UR STRIP-ACNC: NEGATIVE EU/DL
WBC #/AREA URNS HPF: 1 /HPF (ref 0–5)

## 2023-03-19 PROCEDURE — 81025 URINE PREGNANCY TEST: CPT | Performed by: NURSE PRACTITIONER

## 2023-03-19 PROCEDURE — 99284 EMERGENCY DEPT VISIT MOD MDM: CPT | Mod: 25

## 2023-03-19 PROCEDURE — 81000 URINALYSIS NONAUTO W/SCOPE: CPT | Performed by: NURSE PRACTITIONER

## 2023-03-19 PROCEDURE — 63600175 PHARM REV CODE 636 W HCPCS: Performed by: NURSE PRACTITIONER

## 2023-03-19 PROCEDURE — 96375 TX/PRO/DX INJ NEW DRUG ADDON: CPT

## 2023-03-19 PROCEDURE — 96374 THER/PROPH/DIAG INJ IV PUSH: CPT

## 2023-03-19 RX ORDER — BUTALBITAL, ACETAMINOPHEN AND CAFFEINE 50; 325; 40 MG/1; MG/1; MG/1
1 TABLET ORAL EVERY 4 HOURS PRN
Qty: 12 TABLET | Refills: 0 | Status: SHIPPED | OUTPATIENT
Start: 2023-03-19 | End: 2023-03-22

## 2023-03-19 RX ORDER — DIPHENHYDRAMINE HYDROCHLORIDE 50 MG/ML
12.5 INJECTION INTRAMUSCULAR; INTRAVENOUS
Status: COMPLETED | OUTPATIENT
Start: 2023-03-19 | End: 2023-03-19

## 2023-03-19 RX ORDER — BUTALBITAL, ACETAMINOPHEN AND CAFFEINE 50; 325; 40 MG/1; MG/1; MG/1
1 TABLET ORAL EVERY 4 HOURS PRN
Qty: 12 TABLET | Refills: 0 | Status: SHIPPED | OUTPATIENT
Start: 2023-03-19 | End: 2023-03-19 | Stop reason: SDUPTHER

## 2023-03-19 RX ORDER — PROCHLORPERAZINE EDISYLATE 5 MG/ML
10 INJECTION INTRAMUSCULAR; INTRAVENOUS
Status: COMPLETED | OUTPATIENT
Start: 2023-03-19 | End: 2023-03-19

## 2023-03-19 RX ORDER — KETOROLAC TROMETHAMINE 30 MG/ML
15 INJECTION, SOLUTION INTRAMUSCULAR; INTRAVENOUS
Status: COMPLETED | OUTPATIENT
Start: 2023-03-19 | End: 2023-03-19

## 2023-03-19 RX ADMIN — DIPHENHYDRAMINE HYDROCHLORIDE 12.5 MG: 50 INJECTION INTRAMUSCULAR; INTRAVENOUS at 10:03

## 2023-03-19 RX ADMIN — PROCHLORPERAZINE EDISYLATE 10 MG: 5 INJECTION INTRAMUSCULAR; INTRAVENOUS at 10:03

## 2023-03-19 RX ADMIN — KETOROLAC TROMETHAMINE 15 MG: 30 INJECTION, SOLUTION INTRAMUSCULAR at 10:03

## 2023-03-19 NOTE — DISCHARGE INSTRUCTIONS

## 2023-03-19 NOTE — Clinical Note
"Davida Learyserenity Parsons was seen and treated in our emergency department on 3/19/2023.  She may return to work on 03/22/2023.       If you have any questions or concerns, please don't hesitate to call.      Noemy Rucker NP"

## 2023-03-19 NOTE — ED PROVIDER NOTES
Encounter Date: 3/19/2023    SCRIBE #1 NOTE: I, Margarette Mohamud, am scribing for, and in the presence of,  Noemy Rucker NP. I have scribed the following portions of the note - Other sections scribed: HPI, ROS, Physical Exam.     History     Chief Complaint   Patient presents with    Headache     Headache with nausea since last night. No fever reported. Presents awake, alert. Gait WNL. No distress noted.     Davida Parsons is a 22 y.o. female who  has a past medical history of Mental disorder, Ovarian cyst, and Trauma.    The patient presents to the ED for  headache that began last night. Her associated symptoms include light sensitivity. She denies having fever, nausea, vomiting, diarrhea or double vision. She took Fioricet without relief. She shares she is a chronic marijuana user and smokes heavily    The history is provided by the patient. No  was used.   Review of patient's allergies indicates:   Allergen Reactions    Latex, natural rubber Rash    Sulfa (sulfonamide antibiotics) Hives     Past Medical History:   Diagnosis Date    Mental disorder     anxiety and depression    Ovarian cyst     Trauma     patient states she has both desceased parents and suffers from depression and anxiety     Past Surgical History:   Procedure Laterality Date    LAPAROSCOPIC CHOLECYSTECTOMY N/A 7/4/2021    Procedure: CHOLECYSTECTOMY, LAPAROSCOPIC;  Surgeon: Mauricio An MD;  Location: [facility] OR [address] [place] [address];  Service: General;  Laterality: N/A;    TONSILLECTOMY       Family History   Problem Relation Age of Onset    Cancer Father         skin    Hypertension Maternal Grandmother     Diabetes Paternal Grandfather     Amblyopia Neg Hx     Blindness Neg Hx     Cataracts Neg Hx     Glaucoma Neg Hx     Macular degeneration Neg Hx     Retinal detachment Neg Hx      Social History     Tobacco Use    Smoking status: Every Day     Packs/day: 0.50     Types: Cigarettes    Smokeless tobacco: Current   Substance Use Topics     Alcohol use: Yes     Alcohol/week: 0.0 standard drinks     Comment: occationally    Drug use: Yes     Types: Marijuana     Comment: Ectasy     Review of Systems   Constitutional:  Negative for fever.   Eyes:  Positive for photophobia. Negative for visual disturbance.   Gastrointestinal:  Negative for diarrhea, nausea and vomiting.   Neurological:  Positive for headaches.   All other systems reviewed and are negative.    Physical Exam     Initial Vitals [03/19/23 0937]   BP Pulse Resp Temp SpO2   138/74 100 18 98 °F (36.7 °C) 99 %      MAP       --         Physical Exam    Nursing note and vitals reviewed.  Constitutional: She appears well-developed and well-nourished. She is cooperative.  Non-toxic appearance. No distress.   HENT:   Head: Normocephalic and atraumatic.   Mouth/Throat: Oropharynx is clear and moist.   No temporal tenderness   Eyes: Conjunctivae and EOM are normal. Pupils are equal, round, and reactive to light.   Neck: Neck supple. No thyromegaly present.   Normal range of motion.   Full passive range of motion without pain.     Cardiovascular:  Normal rate, regular rhythm, normal heart sounds and normal pulses.           Pulmonary/Chest: Effort normal and breath sounds normal. No respiratory distress.   Abdominal: Abdomen is soft. Bowel sounds are normal. She exhibits no distension. There is no abdominal tenderness.   Musculoskeletal:         General: Normal range of motion.      Cervical back: Full passive range of motion without pain, normal range of motion and neck supple. No edema, erythema or rigidity. No spinous process tenderness or muscular tenderness. Normal range of motion.     Neurological: She is alert and oriented to person, place, and time. She has normal strength. No cranial nerve deficit or sensory deficit.   Skin: Skin is warm, dry and intact. No rash noted.   Psychiatric: She has a normal mood and affect. Her speech is normal and behavior is normal. Judgment and thought content  normal.       ED Course   Procedures  Labs Reviewed   URINALYSIS, REFLEX TO URINE CULTURE - Abnormal; Notable for the following components:       Result Value    Color, UA Colorless (*)     Occult Blood UA 3+ (*)     All other components within normal limits    Narrative:     Specimen Source->Urine   URINALYSIS MICROSCOPIC - Abnormal; Notable for the following components:    RBC, UA 6 (*)     All other components within normal limits    Narrative:     Specimen Source->Urine   POCT URINE PREGNANCY          Imaging Results    None          Medications   prochlorperazine injection Soln 10 mg (10 mg Intravenous Given 3/19/23 1037)   diphenhydrAMINE injection 12.5 mg (12.5 mg Intravenous Given 3/19/23 1037)   ketorolac injection 15 mg (15 mg Intravenous Given 3/19/23 1059)     Medical Decision Making:   Initial Assessment:   A 22-year-old female presents to the ED for headache.   Differential Diagnosis:   Differential Diagnosis includes, but is not limited to:  Ischemic stroke, hemorrhagic stroke, subarachnoid hemorrhage/ruptured aneurysm, intracranial lesion/mass, meningitis/encephalitis, epidural hematoma, subdural hematoma, pseudotumor cerebri, venous sinus thrombosis, CO poisoning, hypertensive encephalopathy, MI/ACS, head trauma/contusion, concussion, sinus headache, dehydration, anxiety, medication non-compliance, primary headache (tension/cluster/migraine).   Clinical Tests:   Lab Tests: Ordered and Reviewed        Scribe Attestation:   Scribe #1: I performed the above scribed service and the documentation accurately describes the services I performed. I attest to the accuracy of the note.      ED Course as of 03/19/23 1126   Sun Mar 19, 2023   1056 Pt states she is feeling improvement in her condition after the Compazine and Benadryl. Still reports a slight headache. Toradol ordered.  [DT]   1057 UA with some blood noted. Pt states she is on her cycle at this time.  [DT]   1125 Pt reports improvement in condition  after the Toradol was given. Strict return precautions given. Pt requesting additional Fioricet as she states she has 3 left. [DT]      ED Course User Index  [DT] Noemy Rucker NP               I, Noemy Rucker NP, personally performed the services described in this documentation.All medical record entries made by the scribe were at my direction and in my presence.I have reviewed the chart and agree that the record reflects my personal performance and is accurate and complete.    Clinical Impression:   Final diagnoses:  [R51.9] Nonintractable headache, unspecified chronicity pattern, unspecified headache type (Primary)        ED Disposition Condition    Discharge Stable          ED Prescriptions       Medication Sig Dispense Start Date End Date Auth. Provider    butalbital-acetaminophen-caffeine -40 mg (FIORICET, ESGIC) -40 mg per tablet  (Status: Discontinued) Take 1 tablet by mouth every 4 (four) hours as needed for Pain or Headaches. 12 tablet 3/19/2023 3/19/2023 Noemy Rucker NP    butalbital-acetaminophen-caffeine -40 mg (FIORICET, ESGIC) -40 mg per tablet Take 1 tablet by mouth every 4 (four) hours as needed for Pain or Headaches. 12 tablet 3/19/2023 3/22/2023 Noemy Rucker NP          Follow-up Information       Follow up With Specialties Details Why Contact Info    Elbert Oliveira MD Family Medicine Schedule an appointment as soon as possible for a visit in 2 days  [address] [facility] [address] [phone]               Noemy Rucker NP  03/19/23 7191

## 2023-03-28 ENCOUNTER — HOSPITAL ENCOUNTER (EMERGENCY)
Facility: HOSPITAL | Age: 23
Discharge: HOME | End: 2023-03-29
Attending: STUDENT IN AN ORGANIZED HEALTH CARE EDUCATION/TRAINING PROGRAM
Payer: MEDICAID

## 2023-03-28 DIAGNOSIS — G43.819 OTHER MIGRAINE WITHOUT STATUS MIGRAINOSUS, INTRACTABLE: Primary | ICD-10-CM

## 2023-03-28 LAB
B-HCG UR QL: NEGATIVE
BASOPHILS # BLD AUTO: 0.02 K/UL (ref 0–0.2)
BASOPHILS NFR BLD: 0.2 % (ref 0–1.9)
BILIRUB UR QL STRIP: NEGATIVE
CLARITY UR: CLEAR
COLOR UR: YELLOW
CTP QC/QA: YES
DIFFERENTIAL METHOD: ABNORMAL
EOSINOPHIL # BLD AUTO: 0.1 K/UL (ref 0–0.5)
EOSINOPHIL NFR BLD: 0.9 % (ref 0–8)
ERYTHROCYTE [DISTWIDTH] IN BLOOD BY AUTOMATED COUNT: 12.2 % (ref 11.5–14.5)
GLUCOSE UR QL STRIP: NEGATIVE
HCT VFR BLD AUTO: 39.1 % (ref 37–48.5)
HGB BLD-MCNC: 13.4 G/DL (ref 12–16)
HGB UR QL STRIP: NEGATIVE
IMM GRANULOCYTES # BLD AUTO: 0.03 K/UL (ref 0–0.04)
IMM GRANULOCYTES NFR BLD AUTO: 0.2 % (ref 0–0.5)
KETONES UR QL STRIP: NEGATIVE
LEUKOCYTE ESTERASE UR QL STRIP: NEGATIVE
LYMPHOCYTES # BLD AUTO: 3.4 K/UL (ref 1–4.8)
LYMPHOCYTES NFR BLD: 27.4 % (ref 18–48)
MCH RBC QN AUTO: 29.7 PG (ref 27–31)
MCHC RBC AUTO-ENTMCNC: 34.3 G/DL (ref 32–36)
MCV RBC AUTO: 87 FL (ref 82–98)
MONOCYTES # BLD AUTO: 0.8 K/UL (ref 0.3–1)
MONOCYTES NFR BLD: 6.2 % (ref 4–15)
NEUTROPHILS # BLD AUTO: 8.2 K/UL (ref 1.8–7.7)
NEUTROPHILS NFR BLD: 65.1 % (ref 38–73)
NITRITE UR QL STRIP: NEGATIVE
NRBC BLD-RTO: 0 /100 WBC
PH UR STRIP: 6 [PH] (ref 5–8)
PLATELET # BLD AUTO: 238 K/UL (ref 150–450)
PMV BLD AUTO: 10.2 FL (ref 9.2–12.9)
PROT UR QL STRIP: ABNORMAL
RBC # BLD AUTO: 4.51 M/UL (ref 4–5.4)
SP GR UR STRIP: 1.02 (ref 1–1.03)
URN SPEC COLLECT METH UR: ABNORMAL
UROBILINOGEN UR STRIP-ACNC: NEGATIVE EU/DL
WBC # BLD AUTO: 12.51 K/UL (ref 3.9–12.7)

## 2023-03-28 PROCEDURE — 96374 THER/PROPH/DIAG INJ IV PUSH: CPT

## 2023-03-28 PROCEDURE — 25000003 PHARM REV CODE 250: Performed by: STUDENT IN AN ORGANIZED HEALTH CARE EDUCATION/TRAINING PROGRAM

## 2023-03-28 PROCEDURE — 80053 COMPREHEN METABOLIC PANEL: CPT | Performed by: STUDENT IN AN ORGANIZED HEALTH CARE EDUCATION/TRAINING PROGRAM

## 2023-03-28 PROCEDURE — 81003 URINALYSIS AUTO W/O SCOPE: CPT | Performed by: STUDENT IN AN ORGANIZED HEALTH CARE EDUCATION/TRAINING PROGRAM

## 2023-03-28 PROCEDURE — 85025 COMPLETE CBC W/AUTO DIFF WBC: CPT | Performed by: STUDENT IN AN ORGANIZED HEALTH CARE EDUCATION/TRAINING PROGRAM

## 2023-03-28 PROCEDURE — 96361 HYDRATE IV INFUSION ADD-ON: CPT

## 2023-03-28 PROCEDURE — 63600175 PHARM REV CODE 636 W HCPCS: Performed by: STUDENT IN AN ORGANIZED HEALTH CARE EDUCATION/TRAINING PROGRAM

## 2023-03-28 PROCEDURE — 96375 TX/PRO/DX INJ NEW DRUG ADDON: CPT

## 2023-03-28 PROCEDURE — 81025 URINE PREGNANCY TEST: CPT | Performed by: NURSE PRACTITIONER

## 2023-03-28 PROCEDURE — 99284 EMERGENCY DEPT VISIT MOD MDM: CPT | Mod: 25

## 2023-03-28 RX ORDER — DIPHENHYDRAMINE HCL 25 MG
25 CAPSULE ORAL
Status: COMPLETED | OUTPATIENT
Start: 2023-03-28 | End: 2023-03-28

## 2023-03-28 RX ORDER — ACETAMINOPHEN 500 MG
1000 TABLET ORAL
Status: COMPLETED | OUTPATIENT
Start: 2023-03-28 | End: 2023-03-28

## 2023-03-28 RX ORDER — PROCHLORPERAZINE EDISYLATE 5 MG/ML
10 INJECTION INTRAMUSCULAR; INTRAVENOUS
Status: COMPLETED | OUTPATIENT
Start: 2023-03-28 | End: 2023-03-28

## 2023-03-28 RX ORDER — KETOROLAC TROMETHAMINE 30 MG/ML
15 INJECTION, SOLUTION INTRAMUSCULAR; INTRAVENOUS
Status: COMPLETED | OUTPATIENT
Start: 2023-03-28 | End: 2023-03-28

## 2023-03-28 RX ADMIN — DIPHENHYDRAMINE HYDROCHLORIDE 25 MG: 25 CAPSULE ORAL at 11:03

## 2023-03-28 RX ADMIN — ACETAMINOPHEN 1000 MG: 500 TABLET ORAL at 11:03

## 2023-03-28 RX ADMIN — PROCHLORPERAZINE EDISYLATE 10 MG: 5 INJECTION INTRAMUSCULAR; INTRAVENOUS at 11:03

## 2023-03-28 RX ADMIN — SODIUM CHLORIDE 1000 ML: 0.9 INJECTION, SOLUTION INTRAVENOUS at 11:03

## 2023-03-28 RX ADMIN — KETOROLAC TROMETHAMINE 15 MG: 30 INJECTION, SOLUTION INTRAMUSCULAR at 11:03

## 2023-03-29 VITALS
DIASTOLIC BLOOD PRESSURE: 78 MMHG | HEART RATE: 92 BPM | BODY MASS INDEX: 41.69 KG/M2 | RESPIRATION RATE: 16 BRPM | OXYGEN SATURATION: 98 % | TEMPERATURE: 98 F | SYSTOLIC BLOOD PRESSURE: 116 MMHG | WEIGHT: 235.31 LBS

## 2023-03-29 LAB
ALBUMIN SERPL BCP-MCNC: 4 G/DL (ref 3.5–5.2)
ALP SERPL-CCNC: 68 U/L (ref 55–135)
ALT SERPL W/O P-5'-P-CCNC: 36 U/L (ref 10–44)
ANION GAP SERPL CALC-SCNC: 12 MMOL/L (ref 8–16)
AST SERPL-CCNC: 24 U/L (ref 10–40)
BILIRUB SERPL-MCNC: 0.4 MG/DL (ref 0.1–1)
BUN SERPL-MCNC: 9 MG/DL (ref 6–20)
CALCIUM SERPL-MCNC: 8.7 MG/DL (ref 8.7–10.5)
CHLORIDE SERPL-SCNC: 105 MMOL/L (ref 95–110)
CO2 SERPL-SCNC: 24 MMOL/L (ref 23–29)
CREAT SERPL-MCNC: 0.8 MG/DL (ref 0.5–1.4)
EST. GFR  (NO RACE VARIABLE): >60 ML/MIN/1.73 M^2
GLUCOSE SERPL-MCNC: 100 MG/DL (ref 70–110)
POTASSIUM SERPL-SCNC: 3.9 MMOL/L (ref 3.5–5.1)
PROT SERPL-MCNC: 7.1 G/DL (ref 6–8.4)
SODIUM SERPL-SCNC: 141 MMOL/L (ref 136–145)

## 2023-03-29 PROCEDURE — 63600175 PHARM REV CODE 636 W HCPCS: Performed by: STUDENT IN AN ORGANIZED HEALTH CARE EDUCATION/TRAINING PROGRAM

## 2023-03-29 RX ADMIN — DEXAMETHASONE 10 MG: 4 TABLET ORAL at 01:03

## 2023-03-29 NOTE — DISCHARGE INSTRUCTIONS
Thank you for coming to our Emergency Department today. It is important to remember that some problems are difficult to diagnose and may not be found during your first visit. Be sure to follow up with your primary care doctor and review any labs/imaging that was performed with them. If you do not have a primary care doctor, you may contact the one listed on your discharge paperwork or you may also call the [facility] Appointment Desk at [phone] to schedule an appointment with one.     All medications may potentially have side effects and it is impossible to predict which medications may give you side effects. If you feel that you are having a negative effect of any medication you should immediately stop taking them and seek medical attention.    Return to the ER with any questions/concerns, new/concerning symptoms, worsening or failure to improve. Do not drive or make any important decisions for 24 hours if you have received any pain medications, sedatives or mood altering drugs during your ER visit.

## 2023-03-29 NOTE — ED NOTES
Pt reports to ED with c/o frontal HA, nausea, and dizziness that began around 1830. Pt has hx of migraines.     Adult Physical Assessment  LOC: Davida Parsons, 22 y.o. female verified via two identifiers.  The patient is awake, alert, oriented and speaking appropriately at this time.  APPEARANCE: Patient resting comfortably and appears to be in no acute distress at this time. Patient is clean and well groomed, patient's clothing is properly fastened.  SKIN:The skin is warm and dry, color consistent with ethnicity, patient has normal skin turgor and moist mucus membranes, skin intact, no breakdown or brusing noted.  MUSCULOSKELETAL: Patient moving all extremities well, no obvious swelling or deformities noted.  RESPIRATORY: Airway is open and patent, respirations are spontaneous, patient has a normal effort and rate, no accessory muscle use noted.  CARDIAC: Patient has a normal rate and rhythm, no periphreal edema noted in any extremity, capillary refill < 3 seconds in all extremities  ABDOMEN: Soft and non tender to palpation, no abdominal distention noted. Bowel sounds present in all four quadrants. Pt c/o nausea.   NEUROLOGIC: Eyes open spontaneously, behavior appropriate to situation, follows commands, facial expression symmetrical, bilateral hand grasp equal and even, purposeful motor response noted, normal sensation in all extremities when touched with a finger. Pt c/o frontal HA and dizziness.

## 2023-03-29 NOTE — FIRST PROVIDER EVALUATION
" Emergency Department TeleTriage Encounter Note      CHIEF COMPLAINT    Chief Complaint   Patient presents with    Headache     Reports frontal lobe headache that started around 8:30 pm. +Nausea, dizziness. Denies history of migraines. Reports hazy vision. Patient appears very uncomfortable.        VITAL SIGNS   Initial Vitals [03/28/23 2209]   BP Pulse Resp Temp SpO2   124/68 (!) 115 18 98 °F (36.7 °C) 98 %      MAP       --            ALLERGIES    Review of patient's allergies indicates:   Allergen Reactions    Latex, natural rubber Rash    Sulfa (sulfonamide antibiotics) Hives       PROVIDER TRIAGE NOTE  This is a teletriage evaluation of a 22 y.o. female presenting to the ED with c/o "incapacitating headache. " X 1-2 hours.  Tried Fioricet without improvement.      ROS: (-) fever, (-) rash  PE:  appears uncomfortable    Initial orders will be placed and care will be transferred to an alternate provider when patient is roomed for a full evaluation. Any additional orders and the final disposition will be determined by that provider.         ORDERS  Labs Reviewed   POCT URINE PREGNANCY       ED Orders (720h ago, onward)      Start Ordered     Status Ordering Provider    03/28/23 2229 03/28/23 2228  POCT urine pregnancy  Once         Ordered TRESSA VALDIVIA              Virtual Visit Note: The provider triage portion of this emergency department evaluation and documentation was performed via [employer], a HIPAA-compliant telemedicine application, in concert with a tele-presenter in the room. A face to face patient evaluation with one of my colleagues will occur once the patient is placed in an emergency department room.      DISCLAIMER: This note was prepared with M*[employer]pike[employer] voice recognition transcription software. Garbled syntax, mangled pronouns, and other bizarre constructions may be attributed to that software system.    "

## 2023-03-29 NOTE — ED PROVIDER NOTES
Encounter Date: 3/28/2023       History     Chief Complaint   Patient presents with    Headache     Reports frontal lobe headache that started around 8:30 pm. +Nausea, dizziness. Denies history of migraines. Reports hazy vision. Patient appears very uncomfortable.      22-year-old female history of migraine headaches presents with headache, nausea have photophobia.  Patient reports symptoms began around 830.  Attempted to take Tylenol without any improvement so came to the emergency department for evaluation.  Denies any fevers or chills.  Denies any weakness numbness or tingling.  Currently states pain is in the frontal headache and like in nature and at times dull.  Rates pain 10/10 at this time.  Endorses nausea but no episodes of vomiting denies any abdominal pain dysuria or hematuria.      Review of patient's allergies indicates:   Allergen Reactions    Latex, natural rubber Rash    Sulfa (sulfonamide antibiotics) Hives     Past Medical History:   Diagnosis Date    Mental disorder     anxiety and depression    Ovarian cyst     Trauma     patient states she has both desceased parents and suffers from depression and anxiety     Past Surgical History:   Procedure Laterality Date    LAPAROSCOPIC CHOLECYSTECTOMY N/A 7/4/2021    Procedure: CHOLECYSTECTOMY, LAPAROSCOPIC;  Surgeon: Mauricio An MD;  Location: [facility] OR [address] [place] [address];  Service: General;  Laterality: N/A;    TONSILLECTOMY       Family History   Problem Relation Age of Onset    Cancer Father         skin    Hypertension Maternal Grandmother     Diabetes Paternal Grandfather     Amblyopia Neg Hx     Blindness Neg Hx     Cataracts Neg Hx     Glaucoma Neg Hx     Macular degeneration Neg Hx     Retinal detachment Neg Hx      Social History     Tobacco Use    Smoking status: Every Day     Packs/day: 0.50     Types: Cigarettes    Smokeless tobacco: Current   Substance Use Topics    Alcohol use: Yes     Alcohol/week: 0.0 standard drinks      Comment: occationally    Drug use: Yes     Types: Marijuana     Comment: Ectasy     Review of Systems   Constitutional:  Negative for chills and fever.   HENT:  Negative for congestion and rhinorrhea.    Eyes:  Positive for photophobia. Negative for pain.   Respiratory:  Negative for cough and shortness of breath.    Cardiovascular:  Negative for chest pain and leg swelling.   Gastrointestinal:  Positive for nausea. Negative for abdominal pain and vomiting.   Endocrine: Negative for polyuria.   Genitourinary:  Negative for dysuria and hematuria.   Musculoskeletal:  Negative for gait problem and neck pain.   Skin:  Negative for rash.   Allergic/Immunologic: Negative for immunocompromised state.   Neurological:  Positive for headaches. Negative for weakness.     Physical Exam     Initial Vitals [03/28/23 2209]   BP Pulse Resp Temp SpO2   124/68 (!) 115 18 98 °F (36.7 °C) 98 %      MAP       --         Physical Exam    Nursing note and vitals reviewed.  Constitutional: She appears well-developed and well-nourished. She is not diaphoretic. No distress.   HENT:   Head: Normocephalic and atraumatic.   Eyes: Conjunctivae and EOM are normal. Pupils are equal, round, and reactive to light.   Neck:   Normal range of motion.  Cardiovascular:  Regular rhythm.           Pulmonary/Chest: Breath sounds normal. No respiratory distress.   Abdominal: Abdomen is soft. Bowel sounds are normal. She exhibits no distension. There is no abdominal tenderness. There is no rebound and no guarding.   Musculoskeletal:         General: No tenderness. Normal range of motion.      Cervical back: Normal range of motion.     Lymphadenopathy:     She has no cervical adenopathy.   Neurological: She is alert and oriented to person, place, and time.   Moves all extremities and carries on conversation. CN- II: PERRL; III/IV/VI: EOMI w/out evidence of nystagmus; V: no deficits appreciated to light touch bilateral face; VII: no facial weakness, no facial  asymmetry. Eyebrow raise symmetric. Smile symmetric; IX/X: palate midline, and raises symmetrically; XI: shoulder shrug 5/5 bilaterally; XII: tongue is midline w/out asymmetry. Strength 5/5 to bilateral upper and lower extremities, sensation intact to light touch.    Skin: Skin is warm. Capillary refill takes less than 2 seconds.   Psychiatric: She has a normal mood and affect. Her behavior is normal.       ED Course   Procedures  Labs Reviewed   CBC W/ AUTO DIFFERENTIAL - Abnormal; Notable for the following components:       Result Value    Gran # (ANC) 8.2 (*)     All other components within normal limits   URINALYSIS, REFLEX TO URINE CULTURE - Abnormal; Notable for the following components:    Protein, UA Trace (*)     All other components within normal limits    Narrative:     Specimen Source->Urine   COMPREHENSIVE METABOLIC PANEL   POCT URINE PREGNANCY          Imaging Results    None          Medications   sodium chloride 0.9% bolus 1,000 mL 1,000 mL (0 mLs Intravenous Stopped 3/29/23 0004)   diphenhydrAMINE capsule 25 mg (25 mg Oral Given 3/28/23 2313)   prochlorperazine injection Soln 10 mg (10 mg Intravenous Given 3/28/23 2313)   acetaminophen tablet 1,000 mg (1,000 mg Oral Given 3/28/23 2313)   ketorolac injection 15 mg (15 mg Intravenous Given 3/28/23 2314)   dexAMETHasone tablet 10 mg (10 mg Oral Given 3/29/23 0126)     Medical Decision Making:   History:   Old Medical Records: I decided to obtain old medical records.  Initial Assessment:   This patient presents with a headache most consistent with migrainous headache.. Differential diagnosis includes migraine versus tension type headache. No headache red flags. Neurologic exam without evidence of meningismus, focal neurologic findings. Presentation not consistent with acute intracranial bleed to include SAH (lack of risk factors, headache history). Presentation not consistent with acute CNS infection to include meningitis or brain abscess, Temporal  arteritis unlikely, as is acute angle closure glaucoma given history and physical findings. Presentation not consistent with other acute, emergent causes of headache at this time. Plan to treat symptomatically with pain medication. No indication for imaging/LP at this time.        Clinical Tests:   Lab Tests: Ordered and Reviewed           ED Course as of 03/29/23 0405   Wed Mar 29, 2023   0012 Chem 14 negative for hypo-or hyper natremia, kalemia, chloridemia, or other electrolyte abnormalities; BUN and creatinine were within normal limits indicating normal kidney function, ALT and AST were within normal limits indicating normal liver function.  CBC without significant leukocytosis, anemia, or platelet abnormalities.  UA wnl  [AS]   0107 Patient reports complete resolution of her headache after medications.  Will give a dose of dexamethasone p.o..  Pt is currently stable for discharge. I see no indication of an emergent process beyond that addressed during our encounter but have duly counseled the patient/family regarding the need for prompt follow-up as well as the indications that should prompt immediate return to the emergency room should new or worrisome developments occur. The patient/family has been provided with verbal and printed direction regarding our final diagnosis(es) as well as instructions regarding use of OTC and/or Rx medications intended to manage the patient's aforementioned conditions. The patient/family verbalized an understanding. The patient/family is asked if there are any questions or concerns. We discuss the case, until all issues are addressed to the patient/family's satisfaction. Patient/family understands and is agreeable to the plan.   [AS]      ED Course User Index  [AS] Liss Leal MD          This dictation has been generated using M-Modal Fluency Direct dictation; some phonetic errors may occur.         Clinical Impression:   Final diagnoses:  [G43.819] Other migraine without  status migrainosus, intractable (Primary)        ED Disposition Condition    Discharge Stable          ED Prescriptions    None       Follow-up Information       Follow up With Specialties Details Why Contact Info    Elbert Oliveira MD Family Medicine Call in 1 day for reassesment [address] [facility] [address] [phone]      [facility] - Emergency Dept Emergency Medicine  If symptoms worsen [address] [facility] [address] [phone]             Liss Leal MD  03/29/23 7380

## 2023-03-30 ENCOUNTER — PATIENT OUTREACH (OUTPATIENT)
Dept: EMERGENCY MEDICINE | Facility: HOSPITAL | Age: 23
End: 2023-03-30
Payer: MEDICAID

## 2023-03-30 NOTE — PROGRESS NOTES
Oanh Aguilera  ED Navigator  Emergency Department    Project: [facility] ED Navigator  Role: Community Health Worker    Date: 03/30/2023  Patient Name: Davida Parsons  MRN: [id number]  PCP: Elbert Oliveira MD    Assessment:     Davida Parsons is a 22 y.o. female who has presented to ED for headache. Patient has visited the ED 4 times in the past 3 months. Patient did not contact PCP.     ED Navigator Initial Assessment    ED Navigator Enrollment Documentation  Consent to Services  Does patient consent to completing the assessment?: Yes  Contact  Method of Initial Contact: Phone  Transportation  Does the patient have issues with Transportation?: No  Does the patient have transportation to and from healthcare appointments?: Yes  Insurance Coverage  Do you have coverage/adequate coverage?: Yes  Type/kind of coverage: HEALTHY BLUE [place]  Is patient able to afford co-pays/deductibles?: Yes  Is patient able to afford HME or supplies?: Yes  Does patient have an established [facility] PCP?: Yes  Able to access?: Yes  Does the patient have a lack of adequate coverage?: No  Specialist Appointment  Did the patient come to the ED to see a specialist?: No  Does the patient have a pending specialist referral?: Yes  Does the patient have a specialist appointment made?: No  PCP Follow Up Appointment  Has the patient had an appointment with a primary care provider in the past year?: Yes  Approximate date: 3/16/23  Provider: Elbert Oliveira MD  Does the patient have a follow up appontment with a PCP?: No  When was the last time you saw your PCP?: 3/16/23  Why does the patient not have a follow up scheduled?: Other (see comments) (Comment: pt just saw PCP on 3/16.)  Medications  Is patient able to afford medication?: Yes  Is patient unable to get medication due to lack of transportation?: No  Psychological  Does the patient have psycho-social concerns?: Yes  What concerns does the patient have?: Anxiety and/or Depression  Food  Does the  patient have concerns about food?: No  Communication/Education  Does the patient have limited English proficiency/English not primary language?: No  Does patient have low literacy and/or low health literacy?: Yes  Does patient have concerns with care?: No  Does patient have dissatisfaction with care?: No  Other Financial Concerns  Does the patient have immediate financial distress?: No  Does the patient have general financial concerns?: No  Other Social Barriers/Concerns  Does the patient have any additional barriers or concerns?: None  Primary Barrier  Barriers identified: Cognitive barrier (health literacy, language and communication, etc.)  Root Cause of ED Utilization: Patient Knowledge/Low Health Literacy  Plan to address Patient Knowledge/Low Health Literacy: Provided information for [facility] 24/7 Nurse triage line [phone] or 1-[facility] ([phone])  Next steps: Provided Education  Was education/educational materials provided surrounding PCP services/creating a medical home?: Yes Was education verbal or written?: Written     Was education/educational materials provided surrounding low cost, healthy foods?: Yes Was education verbal or written?: Written     Was education/educational materials provided surrounding other items? If so, use comment to explain.: Yes Was education verbal or written?: Written   Plan: Provided information for [facility] 24/7 Nurse triage line, [phone] or 1-[facility] ([phone])  Expected Date of Follow Up 1: 4/20/23         Social History     Socioeconomic History    Marital status: Single   Tobacco Use    Smoking status: Every Day     Packs/day: 0.50     Types: Cigarettes    Smokeless tobacco: Current   Substance and Sexual Activity    Alcohol use: Yes     Alcohol/week: 0.0 standard drinks     Comment: occationally    Drug use: Yes     Types: Marijuana     Comment: Ectasy    Sexual activity: Yes     Birth control/protection: Condom, Implant     Social  Determinants of Health     Financial Resource Strain: Low Risk     Difficulty of Paying Living Expenses: Not hard at all   Food Insecurity: No Food Insecurity    Worried About Running Out of Food in the Last Year: Never true    Ran Out of Food in the Last Year: Never true   Transportation Needs: No Transportation Needs    Lack of Transportation (Medical): No    Lack of Transportation (Non-Medical): No   Stress: Stress Concern Present    Feeling of Stress : To some extent   Social Connections: Unknown    Frequency of Communication with Friends and Family: Three times a week    Frequency of Social Gatherings with Friends and Family: Once a week    Marital Status: Never    Housing Stability: Unknown    Unable to Pay for Housing in the Last Year: No    Unstable Housing in the Last Year: No       Plan:   Patient agreed with enrollment and F/U calls. Patient would like list of Neurologists in-network with insurance to get a referral from PCP. She asked if she should return to the ED if headaches return before neuro appt. Advised pt to possibly F/U with PCP or call 24/7 [facility] Nurse line to get instructions on next step before returning to ED. Sending smoking cessation info since she is a tobacco user. Patient declined resources for transportation, food, housing, utilities, and anxiety/stress/depression. Emailed list of neuro from [employer] and smoking cessation info to patient along with MCIP documents (Right Care Right Place form, [place] Virtual Visit Flyer, [facility] PCP scheduling assistance, [facility] on call RN #, and Heart Healthy Diet education). Will F/U with pt on 4/20/23.

## 2023-04-08 ENCOUNTER — NURSE TRIAGE (OUTPATIENT)
Dept: ADMINISTRATIVE | Facility: CLINIC | Age: 23
End: 2023-04-08
Payer: MEDICAID

## 2023-04-09 NOTE — TELEPHONE ENCOUNTER
Reason for Disposition   Patient sounds very sick or weak to the triager    Additional Information   Negative: Shock suspected (e.g., cold/pale/clammy skin, too weak to stand, low BP, rapid pulse)   Negative: Passed out (i.e., lost consciousness, collapsed and was not responding)   Negative: Sounds like a life-threatening emergency to the triager   Negative: Menstrual cramps is main concern   Negative: Abdominal (stomach) pain is main concern   Negative: Vulvar (external genital area) pain or symptoms (e.g., dryness, sores, redness, blisters, bumps) is main concern   Negative: Rectal pain is main concern   Negative: Hip pain is main concern   Negative: Urination pain is main concern (pain with passing urine)   Negative: [1] Pelvic pain AND [2] pregnant < 20 weeks   Negative: [1] Pelvic pain AND [2] pregnant 20 or more weeks   Negative: Postpartum (from 0 to 6 weeks after delivery)   Negative: Pain associated with known hernia or new-onset hernia suspected (reducible bulge in groin/abdomen)   Negative: Followed an abdomen (stomach) injury   Negative: Followed a genital area injury (e.g., vagina, vulva)   Negative: [1] SEVERE pelvic pain (e.g., excruciating) AND [2] vomiting   Negative: [1] SEVERE pelvic pain AND [2] present > 1 hour   Negative: SEVERE vaginal bleeding (e.g., soaking 2 pads or tampons per hour and present 2 or more hours)    Protocols used: Pelvic Pain - Female-A-AH  Pt states she is having pain on R side. Pt states she was bleeding heavy- pt not spotting for 24 hours. pt states dr told her if the med he prescribed didn't work go to ED/UC. Pt reports she took tramadol given by dr - not working. dizzy. denies preg. Pt states her pain level 6-7. Vx2 today. Rec ED as pt states she is feeling sick/weak. Pt agrees.

## 2023-04-17 ENCOUNTER — HOSPITAL ENCOUNTER (EMERGENCY)
Facility: HOSPITAL | Age: 23
Discharge: HOME | End: 2023-04-17
Attending: EMERGENCY MEDICINE
Payer: MEDICAID

## 2023-04-17 ENCOUNTER — OFFICE VISIT (OUTPATIENT)
Dept: OBSTETRICS AND GYNECOLOGY | Facility: CLINIC | Age: 23
End: 2023-04-17
Payer: MEDICAID

## 2023-04-17 VITALS
HEIGHT: 63 IN | DIASTOLIC BLOOD PRESSURE: 75 MMHG | BODY MASS INDEX: 42.17 KG/M2 | WEIGHT: 238 LBS | RESPIRATION RATE: 20 BRPM | HEART RATE: 106 BPM | TEMPERATURE: 98 F | OXYGEN SATURATION: 100 % | SYSTOLIC BLOOD PRESSURE: 137 MMHG

## 2023-04-17 VITALS
SYSTOLIC BLOOD PRESSURE: 120 MMHG | WEIGHT: 247.38 LBS | BODY MASS INDEX: 43.82 KG/M2 | DIASTOLIC BLOOD PRESSURE: 86 MMHG

## 2023-04-17 DIAGNOSIS — K59.00 CONSTIPATION, UNSPECIFIED CONSTIPATION TYPE: ICD-10-CM

## 2023-04-17 DIAGNOSIS — R11.2 NAUSEA AND VOMITING: ICD-10-CM

## 2023-04-17 DIAGNOSIS — R10.9 ABDOMINAL PAIN, UNSPECIFIED ABDOMINAL LOCATION: Primary | ICD-10-CM

## 2023-04-17 DIAGNOSIS — R00.0 TACHYCARDIA: ICD-10-CM

## 2023-04-17 DIAGNOSIS — Z71.1 WORRIED WELL: Primary | ICD-10-CM

## 2023-04-17 LAB
ALBUMIN SERPL BCP-MCNC: 3.8 G/DL (ref 3.5–5.2)
ALP SERPL-CCNC: 71 U/L (ref 55–135)
ALT SERPL W/O P-5'-P-CCNC: 39 U/L (ref 10–44)
ANION GAP SERPL CALC-SCNC: 12 MMOL/L (ref 8–16)
AST SERPL-CCNC: 26 U/L (ref 10–40)
B-HCG UR QL: NEGATIVE
BASOPHILS # BLD AUTO: 0.02 K/UL (ref 0–0.2)
BASOPHILS NFR BLD: 0.2 % (ref 0–1.9)
BILIRUB SERPL-MCNC: 0.3 MG/DL (ref 0.1–1)
BILIRUB UR QL STRIP: NEGATIVE
BUN SERPL-MCNC: 13 MG/DL (ref 6–20)
CALCIUM SERPL-MCNC: 9.1 MG/DL (ref 8.7–10.5)
CHLORIDE SERPL-SCNC: 104 MMOL/L (ref 95–110)
CLARITY UR: CLEAR
CO2 SERPL-SCNC: 22 MMOL/L (ref 23–29)
COLOR UR: YELLOW
CREAT SERPL-MCNC: 0.7 MG/DL (ref 0.5–1.4)
CTP QC/QA: YES
DIFFERENTIAL METHOD: ABNORMAL
EOSINOPHIL # BLD AUTO: 0.2 K/UL (ref 0–0.5)
EOSINOPHIL NFR BLD: 1.5 % (ref 0–8)
ERYTHROCYTE [DISTWIDTH] IN BLOOD BY AUTOMATED COUNT: 12.1 % (ref 11.5–14.5)
EST. GFR  (NO RACE VARIABLE): >60 ML/MIN/1.73 M^2
GLUCOSE SERPL-MCNC: 88 MG/DL (ref 70–110)
GLUCOSE UR QL STRIP: NEGATIVE
HCT VFR BLD AUTO: 40.8 % (ref 37–48.5)
HGB BLD-MCNC: 14 G/DL (ref 12–16)
HGB UR QL STRIP: NEGATIVE
IMM GRANULOCYTES # BLD AUTO: 0.04 K/UL (ref 0–0.04)
IMM GRANULOCYTES NFR BLD AUTO: 0.3 % (ref 0–0.5)
KETONES UR QL STRIP: NEGATIVE
LEUKOCYTE ESTERASE UR QL STRIP: NEGATIVE
LIPASE SERPL-CCNC: 12 U/L (ref 4–60)
LYMPHOCYTES # BLD AUTO: 3.9 K/UL (ref 1–4.8)
LYMPHOCYTES NFR BLD: 30.1 % (ref 18–48)
MCH RBC QN AUTO: 29.2 PG (ref 27–31)
MCHC RBC AUTO-ENTMCNC: 34.3 G/DL (ref 32–36)
MCV RBC AUTO: 85 FL (ref 82–98)
MONOCYTES # BLD AUTO: 0.6 K/UL (ref 0.3–1)
MONOCYTES NFR BLD: 4.9 % (ref 4–15)
NEUTROPHILS # BLD AUTO: 8.1 K/UL (ref 1.8–7.7)
NEUTROPHILS NFR BLD: 63 % (ref 38–73)
NITRITE UR QL STRIP: NEGATIVE
NRBC BLD-RTO: 0 /100 WBC
PH UR STRIP: 7 [PH] (ref 5–8)
PLATELET # BLD AUTO: 273 K/UL (ref 150–450)
PMV BLD AUTO: 10 FL (ref 9.2–12.9)
POTASSIUM SERPL-SCNC: 4.2 MMOL/L (ref 3.5–5.1)
PROT SERPL-MCNC: 7.3 G/DL (ref 6–8.4)
PROT UR QL STRIP: NEGATIVE
RBC # BLD AUTO: 4.79 M/UL (ref 4–5.4)
SODIUM SERPL-SCNC: 138 MMOL/L (ref 136–145)
SP GR UR STRIP: 1.02 (ref 1–1.03)
URN SPEC COLLECT METH UR: NORMAL
UROBILINOGEN UR STRIP-ACNC: NEGATIVE EU/DL
WBC # BLD AUTO: 12.84 K/UL (ref 3.9–12.7)

## 2023-04-17 PROCEDURE — 99212 PR OFFICE/OUTPT VISIT, EST, LEVL II, 10-19 MIN: ICD-10-PCS | Mod: S$PBB,,,

## 2023-04-17 PROCEDURE — 96365 THER/PROPH/DIAG IV INF INIT: CPT | Mod: 59

## 2023-04-17 PROCEDURE — 25000003 PHARM REV CODE 250: Performed by: EMERGENCY MEDICINE

## 2023-04-17 PROCEDURE — 93010 ELECTROCARDIOGRAM REPORT: CPT | Mod: 76,,, | Performed by: INTERNAL MEDICINE

## 2023-04-17 PROCEDURE — 81003 URINALYSIS AUTO W/O SCOPE: CPT | Performed by: EMERGENCY MEDICINE

## 2023-04-17 PROCEDURE — 3074F PR MOST RECENT SYSTOLIC BLOOD PRESSURE < 130 MM HG: ICD-10-PCS | Mod: CPTII,,,

## 2023-04-17 PROCEDURE — 99213 OFFICE O/P EST LOW 20 MIN: CPT | Mod: PBBFAC

## 2023-04-17 PROCEDURE — 3008F BODY MASS INDEX DOCD: CPT | Mod: CPTII,,,

## 2023-04-17 PROCEDURE — 25500020 PHARM REV CODE 255

## 2023-04-17 PROCEDURE — 25000003 PHARM REV CODE 250

## 2023-04-17 PROCEDURE — 99212 OFFICE O/P EST SF 10 MIN: CPT | Mod: S$PBB,,,

## 2023-04-17 PROCEDURE — 63600175 PHARM REV CODE 636 W HCPCS

## 2023-04-17 PROCEDURE — 3079F PR MOST RECENT DIASTOLIC BLOOD PRESSURE 80-89 MM HG: ICD-10-PCS | Mod: CPTII,,,

## 2023-04-17 PROCEDURE — 3074F SYST BP LT 130 MM HG: CPT | Mod: CPTII,,,

## 2023-04-17 PROCEDURE — 80053 COMPREHEN METABOLIC PANEL: CPT | Performed by: EMERGENCY MEDICINE

## 2023-04-17 PROCEDURE — 96376 TX/PRO/DX INJ SAME DRUG ADON: CPT

## 2023-04-17 PROCEDURE — 96375 TX/PRO/DX INJ NEW DRUG ADDON: CPT

## 2023-04-17 PROCEDURE — 93010 ELECTROCARDIOGRAM REPORT: CPT | Mod: ,,, | Performed by: INTERNAL MEDICINE

## 2023-04-17 PROCEDURE — 1159F PR MEDICATION LIST DOCUMENTED IN MEDICAL RECORD: ICD-10-PCS | Mod: CPTII,,,

## 2023-04-17 PROCEDURE — 3008F PR BODY MASS INDEX (BMI) DOCUMENTED: ICD-10-PCS | Mod: CPTII,,,

## 2023-04-17 PROCEDURE — 83690 ASSAY OF LIPASE: CPT

## 2023-04-17 PROCEDURE — 93005 ELECTROCARDIOGRAM TRACING: CPT

## 2023-04-17 PROCEDURE — 85025 COMPLETE CBC W/AUTO DIFF WBC: CPT | Performed by: EMERGENCY MEDICINE

## 2023-04-17 PROCEDURE — 99999 PR PBB SHADOW E&M-EST. PATIENT-LVL III: ICD-10-PCS | Mod: PBBFAC,,,

## 2023-04-17 PROCEDURE — 1159F MED LIST DOCD IN RCRD: CPT | Mod: CPTII,,,

## 2023-04-17 PROCEDURE — 81025 URINE PREGNANCY TEST: CPT | Performed by: EMERGENCY MEDICINE

## 2023-04-17 PROCEDURE — 3079F DIAST BP 80-89 MM HG: CPT | Mod: CPTII,,,

## 2023-04-17 PROCEDURE — 96361 HYDRATE IV INFUSION ADD-ON: CPT

## 2023-04-17 PROCEDURE — 3044F HG A1C LEVEL LT 7.0%: CPT | Mod: CPTII,,,

## 2023-04-17 PROCEDURE — 99285 EMERGENCY DEPT VISIT HI MDM: CPT | Mod: 25,27

## 2023-04-17 PROCEDURE — 99999 PR PBB SHADOW E&M-EST. PATIENT-LVL III: CPT | Mod: PBBFAC,,,

## 2023-04-17 PROCEDURE — 3044F PR MOST RECENT HEMOGLOBIN A1C LEVEL <7.0%: ICD-10-PCS | Mod: CPTII,,,

## 2023-04-17 PROCEDURE — 93010 EKG 12-LEAD: ICD-10-PCS | Mod: ,,, | Performed by: INTERNAL MEDICINE

## 2023-04-17 RX ORDER — ONDANSETRON 4 MG/1
4 TABLET, ORALLY DISINTEGRATING ORAL 3 TIMES DAILY
COMMUNITY
Start: 2023-02-05 | End: 2023-06-20

## 2023-04-17 RX ORDER — ONDANSETRON 4 MG/1
4 TABLET, ORALLY DISINTEGRATING ORAL EVERY 6 HOURS PRN
Qty: 20 TABLET | Refills: 0 | Status: SHIPPED | OUTPATIENT
Start: 2023-04-17 | End: 2023-06-15 | Stop reason: SDUPTHER

## 2023-04-17 RX ORDER — POLYETHYLENE GLYCOL 3350 17 G/17G
17 POWDER, FOR SOLUTION ORAL DAILY
Qty: 14 EACH | Refills: 0 | Status: SHIPPED | OUTPATIENT
Start: 2023-04-17

## 2023-04-17 RX ORDER — HALOPERIDOL 5 MG/ML
5 INJECTION INTRAMUSCULAR
Status: COMPLETED | OUTPATIENT
Start: 2023-04-17 | End: 2023-04-17

## 2023-04-17 RX ORDER — ONDANSETRON 2 MG/ML
4 INJECTION INTRAMUSCULAR; INTRAVENOUS
Status: COMPLETED | OUTPATIENT
Start: 2023-04-17 | End: 2023-04-17

## 2023-04-17 RX ORDER — PROCHLORPERAZINE EDISYLATE 5 MG/ML
10 INJECTION INTRAMUSCULAR; INTRAVENOUS
Status: DISCONTINUED | OUTPATIENT
Start: 2023-04-17 | End: 2023-04-17

## 2023-04-17 RX ORDER — PROMETHAZINE HYDROCHLORIDE 25 MG/1
25 SUPPOSITORY RECTAL EVERY 6 HOURS PRN
Qty: 10 SUPPOSITORY | Refills: 0 | Status: SHIPPED | OUTPATIENT
Start: 2023-04-17 | End: 2023-06-20

## 2023-04-17 RX ORDER — KETOROLAC TROMETHAMINE 30 MG/ML
15 INJECTION, SOLUTION INTRAMUSCULAR; INTRAVENOUS
Status: DISCONTINUED | OUTPATIENT
Start: 2023-04-17 | End: 2023-04-17 | Stop reason: HOSPADM

## 2023-04-17 RX ORDER — MORPHINE SULFATE 4 MG/ML
4 INJECTION, SOLUTION INTRAMUSCULAR; INTRAVENOUS
Status: COMPLETED | OUTPATIENT
Start: 2023-04-17 | End: 2023-04-17

## 2023-04-17 RX ORDER — DIPHENHYDRAMINE HYDROCHLORIDE 50 MG/ML
25 INJECTION INTRAMUSCULAR; INTRAVENOUS
Status: DISCONTINUED | OUTPATIENT
Start: 2023-04-17 | End: 2023-04-17

## 2023-04-17 RX ORDER — DOCUSATE SODIUM 100 MG/1
100 CAPSULE, LIQUID FILLED ORAL 2 TIMES DAILY
Qty: 30 CAPSULE | Refills: 0 | Status: SHIPPED | OUTPATIENT
Start: 2023-04-17 | End: 2023-06-20

## 2023-04-17 RX ORDER — TRAMADOL HYDROCHLORIDE 50 MG/1
50 TABLET ORAL DAILY PRN
COMMUNITY
Start: 2023-04-07

## 2023-04-17 RX ORDER — PROMETHAZINE HYDROCHLORIDE 25 MG/1
25 TABLET ORAL EVERY 6 HOURS PRN
Qty: 15 TABLET | Refills: 0 | Status: SHIPPED | OUTPATIENT
Start: 2023-04-17 | End: 2023-05-01 | Stop reason: SDUPTHER

## 2023-04-17 RX ADMIN — HALOPERIDOL LACTATE 5 MG: 5 INJECTION, SOLUTION INTRAMUSCULAR at 08:04

## 2023-04-17 RX ADMIN — IOHEXOL 100 ML: 350 INJECTION, SOLUTION INTRAVENOUS at 05:04

## 2023-04-17 RX ADMIN — SODIUM CHLORIDE 1000 ML: 9 INJECTION, SOLUTION INTRAVENOUS at 04:04

## 2023-04-17 RX ADMIN — ONDANSETRON 4 MG: 2 INJECTION INTRAMUSCULAR; INTRAVENOUS at 08:04

## 2023-04-17 RX ADMIN — MORPHINE SULFATE 4 MG: 4 INJECTION, SOLUTION INTRAMUSCULAR; INTRAVENOUS at 04:04

## 2023-04-17 RX ADMIN — PROMETHAZINE HYDROCHLORIDE 25 MG: 25 INJECTION INTRAMUSCULAR; INTRAVENOUS at 06:04

## 2023-04-17 RX ADMIN — MORPHINE SULFATE 4 MG: 4 INJECTION, SOLUTION INTRAMUSCULAR; INTRAVENOUS at 06:04

## 2023-04-17 RX ADMIN — SODIUM CHLORIDE 1000 ML: 9 INJECTION, SOLUTION INTRAVENOUS at 07:04

## 2023-04-17 RX ADMIN — ONDANSETRON 4 MG: 2 INJECTION INTRAMUSCULAR; INTRAVENOUS at 04:04

## 2023-04-17 NOTE — PROGRESS NOTES
Subjective:      Patient ID: Davida Parsons is a 22 y.o. female.    Chief Complaint:  Other (Cyst on ovary referred by pcp found on u/s)      History of Present Illness  HPI  Abdominal Pain  Patient presents to clinic today after being seen at urgent care 4 days ago (4/13/2023) and was told she had a cyst on her ovaries. Pt states she started having abdominal pains rated 10/10 2 weeks ago. She has been taking Ibuprofen 800, tramadol, Zofran, and Flexeril and relief only lasts about 2 hours. She states she is noticing her urine stream decreasing but denies any UTI symptoms.   US reviewed    Pelvic US 4/13/2023:  Simple Cyst to Right Ovary measuring 2.4x 1.3x 1.9 cm    Abdominal US 4/13/2023:  Fatty Liver, Left Kidney Stone (echogenic foci to left kidney) measuring 13.1x 7x 5.2cm    GYN & OB History  No LMP recorded. Patient has had an implant.   Date of Last Pap: No result found    OB History   No obstetric history on file.       Review of Systems  Review of Systems   Constitutional:  Negative for activity change and appetite change.   Respiratory:  Negative for shortness of breath.    Cardiovascular:  Negative for chest pain.   Gastrointestinal:  Positive for abdominal pain and nausea. Negative for blood in stool, constipation and diarrhea.   Genitourinary:  Negative for bladder incontinence, decreased libido, dysmenorrhea, dyspareunia, dysuria, flank pain, frequency, genital sores, hematuria, hot flashes, menorrhagia, menstrual problem, pelvic pain, urgency, vaginal bleeding, vaginal discharge, vaginal pain, urinary incontinence, postcoital bleeding, postmenopausal bleeding, vaginal dryness and vaginal odor.   Integumentary:  Negative for breast tenderness.   Neurological:  Negative for headaches.   Breast: Negative for breast self exam and tenderness       Objective:     Physical Exam:   Constitutional: She is oriented to person, place, and time. She appears well-developed.    HENT:   Head: Normocephalic and  atraumatic.    Eyes: EOM are normal.     Cardiovascular:  Normal rate.             Pulmonary/Chest: Effort normal.        Abdominal: Soft. There is no abdominal tenderness.             Musculoskeletal: Normal range of motion.       Neurological: She is oriented to person, place, and time.    Skin: Skin is warm.    Psychiatric: She has a normal mood and affect.       Assessment:     1. Worried well              Plan:     1. Worried well  We discussed pt's ultrasound results.  Ovarian cyst is really small 2cm and likely not the cause of her abdominal pains  I think her pain is more consistent with the Kidney stones  Pt instructed to increase her fluid intake and continue NSAIDs until the stone passes.  ER precautions given.    Follow up as needed

## 2023-04-17 NOTE — ED PROVIDER NOTES
Encounter Date: 4/17/2023       History     Chief Complaint   Patient presents with    Abdominal Pain     Pt sent to ED by outpatient due to kidney stones to right kidney. Pt reports urinary frequency. Denies cp, sob, v/d     22-year-old female with past medical history of hypothyroidism and cholecystectomy presents to ED complaining of a 2 week history of right upper quadrant abdominal pain with associated nausea.  She reports a stabbing sensation that is 10/10 intensity and constant.  She denies any emesis today.  She presented to her OBGYN today for her symptoms where she had an ultrasound that revealed Simple Cyst to Right Ovary measuring 2.4x 1.3x 1.9 cm.  She was advised to present to the ER for possible kidney stone.  She was prescribed tramadol (last dose at 12:00 p.m. today) and 800 mg ibuprofen (last dose yesterday) with no relief.  Her last bowel movement was this morning.  She is still passing flatulence.  Her last menstrual period was at the end of March.  She denies any hematemesis, diarrhea, fever, chills, chest pain, shortness of breath, dysuria, hematuria, vaginal bleeding, vaginal discharge.  No other symptoms reported.    The history is provided by the patient. No  was used.   Review of patient's allergies indicates:   Allergen Reactions    Latex, natural rubber Rash    Sulfa (sulfonamide antibiotics) Hives     Past Medical History:   Diagnosis Date    Mental disorder     anxiety and depression    Ovarian cyst     Trauma     patient states she has both desceased parents and suffers from depression and anxiety     Past Surgical History:   Procedure Laterality Date    LAPAROSCOPIC CHOLECYSTECTOMY N/A 7/4/2021    Procedure: CHOLECYSTECTOMY, LAPAROSCOPIC;  Surgeon: Mauricio An MD;  Location: [facility] OR [address] [place] [address];  Service: General;  Laterality: N/A;    TONSILLECTOMY       Family History   Problem Relation Age of Onset    Cancer Father         skin    Hypertension Maternal  Grandmother     Diabetes Paternal Grandfather     Amblyopia Neg Hx     Blindness Neg Hx     Cataracts Neg Hx     Glaucoma Neg Hx     Macular degeneration Neg Hx     Retinal detachment Neg Hx      Social History     Tobacco Use    Smoking status: Every Day     Packs/day: 0.50     Types: Cigarettes    Smokeless tobacco: Current   Substance Use Topics    Alcohol use: Yes     Alcohol/week: 0.0 standard drinks     Comment: occationally    Drug use: Yes     Types: Marijuana     Comment: Ectasy     Review of Systems   Constitutional:  Negative for chills and fever.   HENT:  Negative for congestion, ear pain, rhinorrhea and sore throat.    Eyes:  Negative for redness.   Respiratory:  Negative for cough and shortness of breath.    Cardiovascular:  Negative for chest pain.   Gastrointestinal:  Positive for abdominal pain and nausea. Negative for diarrhea and vomiting.        (-) hematemesis   Genitourinary:  Negative for decreased urine volume, difficulty urinating, dysuria, frequency, hematuria, urgency, vaginal bleeding, vaginal discharge and vaginal pain.   Musculoskeletal:  Negative for back pain and neck pain.   Skin:  Negative for rash.   Neurological:  Negative for headaches.   Psychiatric/Behavioral:  Negative for confusion.      Physical Exam     Initial Vitals [04/17/23 1532]   BP Pulse Resp Temp SpO2   (!) 148/86 (!) 129 19 98 °F (36.7 °C) 97 %      MAP       --         Physical Exam    Nursing note and vitals reviewed.  Constitutional: She appears well-developed and well-nourished.  Non-toxic appearance. She does not appear ill.   HENT:   Head: Normocephalic and atraumatic.   Right Ear: Hearing, tympanic membrane, external ear and ear canal normal. Tympanic membrane is not perforated, not erythematous and not bulging.   Left Ear: Hearing, tympanic membrane, external ear and ear canal normal. Tympanic membrane is not perforated, not erythematous and not bulging.   Nose: Nose normal.   Mouth/Throat: Uvula is  midline, oropharynx is clear and moist and mucous membranes are normal.   Eyes: Conjunctivae and EOM are normal.   Neck: Neck supple.   Normal range of motion.   Full passive range of motion without pain.     Cardiovascular:  Normal rate and regular rhythm.           Pulses:       Radial pulses are 2+ on the right side and 2+ on the left side.   Pulmonary/Chest: Effort normal and breath sounds normal. No accessory muscle usage. No respiratory distress. She has no decreased breath sounds.   Abdominal: Abdomen is soft. Bowel sounds are normal. She exhibits no distension. There is generalized abdominal tenderness.   No right CVA tenderness.  No left CVA tenderness. There is no rebound and no guarding.   Musculoskeletal:         General: Normal range of motion.      Cervical back: Full passive range of motion without pain, normal range of motion and neck supple. No rigidity.     Neurological: She is alert. No cranial nerve deficit.   Neuro intact.  Strength and sensation intact bilateral upper and lower extremities.   Skin: Skin is warm and dry.   Psychiatric: She has a normal mood and affect.       ED Course   Procedures  Labs Reviewed   CBC W/ AUTO DIFFERENTIAL - Abnormal; Notable for the following components:       Result Value    WBC 12.84 (*)     Gran # (ANC) 8.1 (*)     All other components within normal limits   COMPREHENSIVE METABOLIC PANEL - Abnormal; Notable for the following components:    CO2 22 (*)     All other components within normal limits   URINALYSIS, REFLEX TO URINE CULTURE    Narrative:     Specimen Source->Urine   LIPASE   POCT URINE PREGNANCY     EKG Readings: (Independently Interpreted)   sinus tachycardia with a rate of 133.  Rightward axis.  SC interval 138, .  No STEMI.      Repeat EKG reveals sinus tachycardia at a rate of 104.  SC interval 164.  .  No STEMI.       Imaging Results              CT Abdomen Pelvis With Contrast (Final result)  Result time 04/17/23 17:41:49       Final result by Odette Starks MD (04/17/23 17:41:49)                   Impression:      Scratch no nephrolithiasis.  No obstructive uropathy.    Mild hepatosplenomegaly.    Moderate colonic stool and moderately large stool in the rectum.    Prominent lymph nodes in the right mesenteric fat, which are not new.  Consider mesenteric adenitis as a diagnosis of exclusion.      Electronically signed by: Odette Starks  Date:    04/17/2023  Time:    17:41               Narrative:    EXAMINATION:  CT OF ABDOMEN PELVIS WITH    CLINICAL HISTORY:  Right upper quadrant abdominal pain x2 weeks.  10/10 intensity and constant.  No right ovarian cyst.  Rule out kidney stone.    TECHNIQUE:  5 mm enhanced axial images were obtained from the lung bases through the greater trochanters.   mL of Omnipaque 350 was injected.    COMPARISON:  09/12/2022    FINDINGS:  Mild past splenomegaly is seen.  There is no intrahepatic biliary ductal dilatation.  There is hepatic steatosis.    Pancreas, kidneys, and adrenal glands are unremarkable. The gallbladder is surgically absent.    There is no definite evidence for abdominal adenopathy or ascites.    Multiple prominent right mesenteric lymph nodes are present (series 2 axial image 99 through 110).  There are no pelvic masses.  There is a small right ovarian cyst, which measures approximately 2.2 x 2.8 x 3.3 cm (series 2 axial image 153 at dialysis series 601 coronal image 93).  The appendix is not inflamed.  Moderate colonic stool is present.  There is moderately large rectal stool.    There is no free fluid in the pelvis.    There is moderate bibasilar atelectasis.                                       Medications   ketorolac injection 15 mg (15 mg Intravenous Not Given 4/17/23 1600)   sodium chloride 0.9% bolus 1,000 mL 1,000 mL (0 mLs Intravenous Stopped 4/17/23 1858)   ondansetron injection 4 mg (4 mg Intravenous Given 4/17/23 1634)   morphine injection 4 mg (4 mg Intravenous Given  4/17/23 1643)   iohexoL (OMNIPAQUE 350) injection 100 mL (100 mLs Intravenous Given 4/17/23 1719)   morphine injection 4 mg (4 mg Intravenous Given 4/17/23 1850)   promethazine (PHENERGAN) 25 mg in dextrose 5 % (D5W) 50 mL IVPB (0 mg Intravenous Stopped 4/17/23 2011)   sodium chloride 0.9% bolus 1,000 mL 1,000 mL (0 mLs Intravenous Stopped 4/17/23 2108)   haloperidol lactate injection 5 mg (5 mg Intravenous Given 4/17/23 2022)   ondansetron injection 4 mg (4 mg Intravenous Given 4/17/23 2009)     Medical Decision Making:   Clinical Tests:   Lab Tests: Ordered and Reviewed  Radiological Study: Ordered and Reviewed  Medical Tests: Ordered and Reviewed  ED Management:  This is a 22-year-old female with past medical history of hypothyroidism and cholecystectomy presents to ED complaining of a 2 week history of right upper quadrant abdominal pain with associated nausea.  On physical exam, patient is well-appearing and in no acute distress.  Nontoxic appearing.  Lungs are clear to auscultation bilaterally.  Generalized abdominal tenderness.  No guarding, rigidity, rebound.  2+ radial pulses bilaterally.  Posterior oropharynx is not erythematous.  No edema or exudate.  Uvula midline.  Bilateral tympanic membrane is normal.  No erythema, bulging, or perforations.  Neuro intact.  Strength and sensation intact bilateral upper and lower extremities.  No CVA tenderness bilaterally.  Fluids, Zofran, Toradol ordered.  Will reassess.  EKG reveals sinus tachycardia with a rate of 133.  Rightward axis.  UT interval 138, .  No STEMI.  Upon reassessment, patient still reporting pain and nausea.  Morphine and Phenergan ordered.  Will reassess.  Lipase unremarkable.  Doubt pancreatitis.  UPT negative.  CBC revealed mild leukocytosis of 12.84.  Otherwise no evidence of any anemia.  CMP revealed CO2 22.  Otherwise no other electrolyte abnormality.  UA unremarkable.  Doubt cystitis.  CT revealed  no nephrolithiasis.  No obstructive  uropathy.     Mild hepatosplenomegaly.     Moderate colonic stool and moderately large stool in the rectum.     Prominent lymph nodes in the right mesenteric fat, which are not new.  Consider mesenteric adenitis as a diagnosis of exclusion.  Upon reassessment, patient is still reporting nausea and vomiting.  Consider giving patient Benadryl and Compazine; however, patient states that she is been given Compazine in the past, which severely exacerbates her symptoms.  Repeat EKG revealed QT of 462.  Haldol and another dose of Zofran ordered.  Will reassess.  Upon reassessment, patient reports relief to her pain in her nausea.  She is able to tolerate p.o. challenge.  Will discharge patient on MiraLax, Colace, Zofran, Phenergan, and Phenergan suppositories.  Ambulatory referral to GI ordered.  Urged prompt follow-up with GI and PCP for further evaluation.    Strict return precautions given. I discussed with the patient/family the diagnosis, treatment plan, indications for return to the emergency department, and for expected follow-up. The patient/family verbalized an understanding. The patient/family is asked if there are any questions or concerns. We discuss the case, until all issues are addressed to the patient/family's satisfaction. Patient/family understands and is agreeable to the plan. Patient is stable and ready for discharge.                          Clinical Impression:   Final diagnoses:  [R00.0] Tachycardia  [R11.2] Nausea and vomiting  [R10.9] Abdominal pain, unspecified abdominal location (Primary)  [K59.00] Constipation, unspecified constipation type        ED Disposition Condition    Discharge Stable          ED Prescriptions       Medication Sig Dispense Start Date End Date Auth. Provider    ondansetron (ZOFRAN-ODT) 4 MG TbDL Take 1 tablet (4 mg total) by mouth every 6 (six) hours as needed (nausea). 20 tablet 4/17/2023 -- Karey Toure PA-C    promethazine (PHENERGAN) 25 MG tablet Take 1 tablet (25 mg  total) by mouth every 6 (six) hours as needed for Nausea. 15 tablet 4/17/2023 -- Karey Toure PA-C    promethazine (PHENERGAN) 25 MG suppository Place 1 suppository (25 mg total) rectally every 6 (six) hours as needed for Nausea. 10 suppository 4/17/2023 -- Karey Toure PA-C    polyethylene glycol (GLYCOLAX) 17 gram PwPk Take 17 g by mouth once daily. 14 each 4/17/2023 -- Karey Toure PA-C    docusate sodium (COLACE) 100 MG capsule Take 1 capsule (100 mg total) by mouth 2 (two) times daily. 30 capsule 4/17/2023 -- Karey Toure PA-C          Follow-up Information       Follow up With Specialties Details Why Contact Info    Elbert Oliveira MD Family Medicine In 2 days for further evaluation [id number] [facility] [address] [phone]      [facility] [place] - Emergency Dept Emergency Medicine In 2 days If symptoms worsen [facility] [address]  [facility] [address] [phone]             Karey Toure PA-C  04/17/23 4489

## 2023-04-17 NOTE — Clinical Note
Vicente Gaitan accompanied their spouse to the emergency department on 4/17/2023. They may return to work on 04/18/2023.      If you have any questions or concerns, please don't hesitate to call.      Karey Toure PA-C

## 2023-04-18 NOTE — DISCHARGE INSTRUCTIONS

## 2023-04-18 NOTE — ED NOTES
Pt reports that her nausea is better and has subsided. Pt also reports that her pain has reduced from a 7/10 to a 3/10 and feeling much better.

## 2023-04-21 ENCOUNTER — PATIENT OUTREACH (OUTPATIENT)
Dept: EMERGENCY MEDICINE | Facility: HOSPITAL | Age: 23
End: 2023-04-21
Payer: MEDICAID

## 2023-04-21 ENCOUNTER — OFFICE VISIT (OUTPATIENT)
Dept: URGENT CARE | Facility: CLINIC | Age: 23
End: 2023-04-21
Payer: MEDICAID

## 2023-04-21 VITALS
TEMPERATURE: 98 F | DIASTOLIC BLOOD PRESSURE: 80 MMHG | SYSTOLIC BLOOD PRESSURE: 119 MMHG | HEART RATE: 107 BPM | HEIGHT: 63 IN | RESPIRATION RATE: 16 BRPM | WEIGHT: 238 LBS | OXYGEN SATURATION: 97 % | BODY MASS INDEX: 42.17 KG/M2

## 2023-04-21 DIAGNOSIS — K59.00 CONSTIPATION, UNSPECIFIED CONSTIPATION TYPE: ICD-10-CM

## 2023-04-21 DIAGNOSIS — R10.31 RIGHT LOWER QUADRANT ABDOMINAL PAIN: Primary | ICD-10-CM

## 2023-04-21 LAB
B-HCG UR QL: NEGATIVE
BILIRUB UR QL STRIP: NEGATIVE
CTP QC/QA: YES
GLUCOSE UR QL STRIP: NEGATIVE
KETONES UR QL STRIP: NEGATIVE
LEUKOCYTE ESTERASE UR QL STRIP: NEGATIVE
PH, POC UA: 6.5
POC BLOOD, URINE: NEGATIVE
POC NITRATES, URINE: NEGATIVE
PROT UR QL STRIP: NEGATIVE
SP GR UR STRIP: 1.02 (ref 1–1.03)
UROBILINOGEN UR STRIP-ACNC: NORMAL (ref 0.1–1.1)

## 2023-04-21 PROCEDURE — 81025 POCT URINE PREGNANCY: ICD-10-PCS | Mod: S$GLB,,, | Performed by: NURSE PRACTITIONER

## 2023-04-21 PROCEDURE — 81003 URINALYSIS AUTO W/O SCOPE: CPT | Mod: QW,S$GLB,, | Performed by: NURSE PRACTITIONER

## 2023-04-21 PROCEDURE — 81003 POCT URINALYSIS, DIPSTICK, AUTOMATED, W/O SCOPE: ICD-10-PCS | Mod: QW,S$GLB,, | Performed by: NURSE PRACTITIONER

## 2023-04-21 PROCEDURE — 99213 PR OFFICE/OUTPT VISIT, EST, LEVL III, 20-29 MIN: ICD-10-PCS | Mod: S$GLB,,, | Performed by: NURSE PRACTITIONER

## 2023-04-21 PROCEDURE — 81025 URINE PREGNANCY TEST: CPT | Mod: S$GLB,,, | Performed by: NURSE PRACTITIONER

## 2023-04-21 PROCEDURE — 99213 OFFICE O/P EST LOW 20 MIN: CPT | Mod: S$GLB,,, | Performed by: NURSE PRACTITIONER

## 2023-04-21 RX ORDER — KETOROLAC TROMETHAMINE 30 MG/ML
30 INJECTION, SOLUTION INTRAMUSCULAR; INTRAVENOUS
Status: COMPLETED | OUTPATIENT
Start: 2023-04-21 | End: 2023-04-21

## 2023-04-21 RX ADMIN — KETOROLAC TROMETHAMINE 30 MG: 30 INJECTION, SOLUTION INTRAMUSCULAR; INTRAVENOUS at 08:04

## 2023-04-21 NOTE — PROGRESS NOTES
"Subjective:      Patient ID: Davida Parsons is a 22 y.o. female.    Vitals:  height is 5' 3" (1.6 m) and weight is 108 kg (238 lb). Her oral temperature is 98.4 °F (36.9 °C). Her blood pressure is 119/80 and her pulse is 107. Her respiration is 16 and oxygen saturation is 97%.     Chief Complaint: Abdominal Pain    Patient has visited the ED for current abdominal pain about 1 week ago. She had a CT performed with only finding of 2 cm cyst on R ovary, per patient.     22 year old female presents to clinic with complaints of abdominal pain. ER visit 4 days ago with c/o of RUQ pain and nausea. Treated with zofran, phenergan , colace, glycolax instructed to f/u with pcp in 2 days. CT of the abdomenmild splenomegaly, hepatic steatosis, gallbladder surgically absent, snall right ovarian cyst, moderate colonic stool, large rectal stool, moderte bibasilar atelectasis. Reports has only been able to pass a small amount of stool on 2 attempts since leaving the ER, able to burp and pass flatus     Abdominal Pain  This is a new problem. The current episode started 1 to 4 weeks ago (3 weeks ago). The problem occurs constantly. The problem has been gradually worsening. The pain is located in the periumbilical region and right flank. The pain is at a severity of 7/10. The pain is severe. The quality of the pain is sharp. The abdominal pain does not radiate. Associated symptoms include constipation. Pertinent negatives include no anorexia, arthralgias, belching, diarrhea, dysuria, fever, flatus, frequency, headaches, hematochezia, hematuria, melena, myalgias, nausea, vomiting or weight loss. Treatments tried: Tramadol (out of tramadol), tylenol, ibuprofen, zofran. The treatment provided moderate relief. Her past medical history is significant for abdominal surgery (cholecystectomy in 2021) and gallstones. There is no history of colon cancer, Crohn's disease, GERD, irritable bowel syndrome, pancreatitis, PUD or ulcerative colitis. " Patient's medical history does not include kidney stones and UTI.     Constitution: Negative for fever.   Gastrointestinal:  Positive for abdominal pain, abdominal bloating, history of abdominal surgery (cholecystectomy in 2021) and constipation. Negative for nausea, vomiting, diarrhea and bright red blood in stool.   Genitourinary:  Negative for dysuria, frequency and hematuria.   Musculoskeletal:  Negative for joint pain and muscle ache.   Neurological:  Negative for headaches.    Objective:     Physical Exam   Constitutional: She is oriented to person, place, and time. She appears well-developed.   HENT:   Head: Normocephalic and atraumatic.   Ears:   Right Ear: External ear normal.   Left Ear: External ear normal.   Nose: Nose normal.   Mouth/Throat: Mucous membranes are normal.   Eyes: Conjunctivae and lids are normal.   Neck: Trachea normal. Neck supple.   Cardiovascular: Normal rate.   Pulmonary/Chest: Effort normal. No respiratory distress.   Abdominal: Normal appearance and bowel sounds are normal. She exhibits no distension and no mass. Soft. There is generalized abdominal tenderness and tenderness in the right lower quadrant.   Musculoskeletal: Normal range of motion.         General: Normal range of motion.   Neurological: She is alert and oriented to person, place, and time. She has normal strength.   Skin: Skin is warm, dry, intact, not diaphoretic and not pale.   Psychiatric: Her speech is normal and behavior is normal. Judgment and thought content normal.   Nursing note and vitals reviewed.    Assessment:     1. Right lower quadrant abdominal pain    2. Constipation, unspecified constipation type         Results for orders placed or performed in visit on 04/21/23   POCT Urinalysis, Dipstick, Automated, W/O Scope   Result Value Ref Range    POC Blood, Urine Negative Negative    POC Bilirubin, Urine Negative Negative    POC Urobilinogen, Urine Normal 0.1 - 1.1    POC Ketones, Urine Negative Negative     POC Protein, Urine Negative Negative    POC Nitrates, Urine Negative Negative    POC Glucose, Urine Negative Negative    pH, UA 6.5     POC Specific Gravity, Urine 1.020 1.003 - 1.029    POC Leukocytes, Urine Negative Negative   POCT urine pregnancy   Result Value Ref Range    POC Preg Test, Ur Negative Negative     Acceptable Yes       Plan:       Right lower quadrant abdominal pain  -     POCT Urinalysis, Dipstick, Automated, W/O Scope  -     POCT urine pregnancy  -     ketorolac injection 30 mg    Constipation, unspecified constipation type      Patient Instructions   Eat high-fiber foods. These include whole grains, fruits, and vegetables.  Drink plenty of water and other fluids each day. This helps to keep your bowel movements soft.  Set a regular schedule to try and have a bowel movement. Do not ignore the urge to have a bowel movement.  Give yourself plenty of time to have a bowel movement.  Be active. Walk, garden, or do something active for 30 minutes or more on most days of the week.  Sitting in a warm bath can help you relax and feel like you can have a bowel movement.  use fleets enemas as directed  Please go to the Emergency Department for any concerns or worsening of condition.  Please follow up with your primary care doctor or specialist as needed.    If you  smoke, please stop smoking.

## 2023-04-22 NOTE — PATIENT INSTRUCTIONS
Eat high-fiber foods. These include whole grains, fruits, and vegetables.  Drink plenty of water and other fluids each day. This helps to keep your bowel movements soft.  Set a regular schedule to try and have a bowel movement. Do not ignore the urge to have a bowel movement.  Give yourself plenty of time to have a bowel movement.  Be active. Walk, garden, or do something active for 30 minutes or more on most days of the week.  Sitting in a warm bath can help you relax and feel like you can have a bowel movement.  use fleets enemas as directed  Please go to the Emergency Department for any concerns or worsening of condition.  Please follow up with your primary care doctor or specialist as needed.    If you  smoke, please stop smoking.

## 2023-04-30 ENCOUNTER — HOSPITAL ENCOUNTER (EMERGENCY)
Facility: HOSPITAL | Age: 23
Discharge: HOME | End: 2023-05-01
Attending: STUDENT IN AN ORGANIZED HEALTH CARE EDUCATION/TRAINING PROGRAM
Payer: MEDICAID

## 2023-04-30 DIAGNOSIS — R10.33 PERIUMBILICAL ABDOMINAL PAIN: Primary | ICD-10-CM

## 2023-04-30 LAB
ALBUMIN SERPL BCP-MCNC: 4.1 G/DL (ref 3.5–5.2)
ALP SERPL-CCNC: 80 U/L (ref 55–135)
ALT SERPL W/O P-5'-P-CCNC: 36 U/L (ref 10–44)
ANION GAP SERPL CALC-SCNC: 10 MMOL/L (ref 8–16)
AST SERPL-CCNC: 23 U/L (ref 10–40)
B-HCG UR QL: NEGATIVE
BASOPHILS # BLD AUTO: 0.04 K/UL (ref 0–0.2)
BASOPHILS NFR BLD: 0.3 % (ref 0–1.9)
BILIRUB SERPL-MCNC: 0.3 MG/DL (ref 0.1–1)
BILIRUB UR QL STRIP: NEGATIVE
BUN SERPL-MCNC: 12 MG/DL (ref 6–20)
CALCIUM SERPL-MCNC: 9.7 MG/DL (ref 8.7–10.5)
CHLORIDE SERPL-SCNC: 104 MMOL/L (ref 95–110)
CLARITY UR: ABNORMAL
CO2 SERPL-SCNC: 25 MMOL/L (ref 23–29)
COLOR UR: YELLOW
CREAT SERPL-MCNC: 0.8 MG/DL (ref 0.5–1.4)
CTP QC/QA: YES
DIFFERENTIAL METHOD: ABNORMAL
EOSINOPHIL # BLD AUTO: 0.2 K/UL (ref 0–0.5)
EOSINOPHIL NFR BLD: 1.1 % (ref 0–8)
ERYTHROCYTE [DISTWIDTH] IN BLOOD BY AUTOMATED COUNT: 12.3 % (ref 11.5–14.5)
EST. GFR  (NO RACE VARIABLE): >60 ML/MIN/1.73 M^2
GLUCOSE SERPL-MCNC: 87 MG/DL (ref 70–110)
GLUCOSE UR QL STRIP: NEGATIVE
HCT VFR BLD AUTO: 42.7 % (ref 37–48.5)
HGB BLD-MCNC: 14.7 G/DL (ref 12–16)
HGB UR QL STRIP: ABNORMAL
IMM GRANULOCYTES # BLD AUTO: 0.06 K/UL (ref 0–0.04)
IMM GRANULOCYTES NFR BLD AUTO: 0.4 % (ref 0–0.5)
KETONES UR QL STRIP: NEGATIVE
LEUKOCYTE ESTERASE UR QL STRIP: NEGATIVE
LIPASE SERPL-CCNC: 13 U/L (ref 4–60)
LYMPHOCYTES # BLD AUTO: 4 K/UL (ref 1–4.8)
LYMPHOCYTES NFR BLD: 28.7 % (ref 18–48)
MCH RBC QN AUTO: 29.9 PG (ref 27–31)
MCHC RBC AUTO-ENTMCNC: 34.4 G/DL (ref 32–36)
MCV RBC AUTO: 87 FL (ref 82–98)
MONOCYTES # BLD AUTO: 0.7 K/UL (ref 0.3–1)
MONOCYTES NFR BLD: 5.3 % (ref 4–15)
NEUTROPHILS # BLD AUTO: 9 K/UL (ref 1.8–7.7)
NEUTROPHILS NFR BLD: 64.2 % (ref 38–73)
NITRITE UR QL STRIP: NEGATIVE
NRBC BLD-RTO: 0 /100 WBC
PH UR STRIP: 6 [PH] (ref 5–8)
PLATELET # BLD AUTO: 302 K/UL (ref 150–450)
PMV BLD AUTO: 9.9 FL (ref 9.2–12.9)
POTASSIUM SERPL-SCNC: 4.2 MMOL/L (ref 3.5–5.1)
PROT SERPL-MCNC: 7.7 G/DL (ref 6–8.4)
PROT UR QL STRIP: ABNORMAL
RBC # BLD AUTO: 4.91 M/UL (ref 4–5.4)
SODIUM SERPL-SCNC: 139 MMOL/L (ref 136–145)
SP GR UR STRIP: 1.03 (ref 1–1.03)
URN SPEC COLLECT METH UR: ABNORMAL
UROBILINOGEN UR STRIP-ACNC: NEGATIVE EU/DL
WBC # BLD AUTO: 14 K/UL (ref 3.9–12.7)

## 2023-04-30 PROCEDURE — 96375 TX/PRO/DX INJ NEW DRUG ADDON: CPT

## 2023-04-30 PROCEDURE — 81003 URINALYSIS AUTO W/O SCOPE: CPT | Performed by: STUDENT IN AN ORGANIZED HEALTH CARE EDUCATION/TRAINING PROGRAM

## 2023-04-30 PROCEDURE — 83690 ASSAY OF LIPASE: CPT | Performed by: STUDENT IN AN ORGANIZED HEALTH CARE EDUCATION/TRAINING PROGRAM

## 2023-04-30 PROCEDURE — 81025 URINE PREGNANCY TEST: CPT | Performed by: STUDENT IN AN ORGANIZED HEALTH CARE EDUCATION/TRAINING PROGRAM

## 2023-04-30 PROCEDURE — 85025 COMPLETE CBC W/AUTO DIFF WBC: CPT | Performed by: STUDENT IN AN ORGANIZED HEALTH CARE EDUCATION/TRAINING PROGRAM

## 2023-04-30 PROCEDURE — 25000003 PHARM REV CODE 250: Performed by: STUDENT IN AN ORGANIZED HEALTH CARE EDUCATION/TRAINING PROGRAM

## 2023-04-30 PROCEDURE — 96361 HYDRATE IV INFUSION ADD-ON: CPT

## 2023-04-30 PROCEDURE — 80053 COMPREHEN METABOLIC PANEL: CPT | Performed by: STUDENT IN AN ORGANIZED HEALTH CARE EDUCATION/TRAINING PROGRAM

## 2023-04-30 PROCEDURE — 99285 EMERGENCY DEPT VISIT HI MDM: CPT | Mod: 25

## 2023-04-30 PROCEDURE — 63600175 PHARM REV CODE 636 W HCPCS: Performed by: STUDENT IN AN ORGANIZED HEALTH CARE EDUCATION/TRAINING PROGRAM

## 2023-04-30 PROCEDURE — 96374 THER/PROPH/DIAG INJ IV PUSH: CPT

## 2023-04-30 RX ORDER — ONDANSETRON 2 MG/ML
4 INJECTION INTRAMUSCULAR; INTRAVENOUS
Status: COMPLETED | OUTPATIENT
Start: 2023-04-30 | End: 2023-04-30

## 2023-04-30 RX ORDER — DROPERIDOL 2.5 MG/ML
1.25 INJECTION, SOLUTION INTRAMUSCULAR; INTRAVENOUS ONCE
Status: COMPLETED | OUTPATIENT
Start: 2023-04-30 | End: 2023-04-30

## 2023-04-30 RX ORDER — MORPHINE SULFATE 4 MG/ML
4 INJECTION, SOLUTION INTRAMUSCULAR; INTRAVENOUS
Status: COMPLETED | OUTPATIENT
Start: 2023-04-30 | End: 2023-04-30

## 2023-04-30 RX ADMIN — DROPERIDOL 1.25 MG: 2.5 INJECTION, SOLUTION INTRAMUSCULAR; INTRAVENOUS at 09:04

## 2023-04-30 RX ADMIN — SODIUM CHLORIDE 1000 ML: 0.9 INJECTION, SOLUTION INTRAVENOUS at 09:04

## 2023-04-30 RX ADMIN — MORPHINE SULFATE 4 MG: 4 INJECTION INTRAVENOUS at 11:04

## 2023-04-30 RX ADMIN — ONDANSETRON HYDROCHLORIDE 4 MG: 2 SOLUTION INTRAMUSCULAR; INTRAVENOUS at 11:04

## 2023-04-30 RX ADMIN — IOHEXOL 100 ML: 350 INJECTION, SOLUTION INTRAVENOUS at 11:04

## 2023-05-01 ENCOUNTER — TELEPHONE (OUTPATIENT)
Dept: EMERGENCY MEDICINE | Facility: HOSPITAL | Age: 23
End: 2023-05-01
Payer: MEDICAID

## 2023-05-01 VITALS
BODY MASS INDEX: 41.99 KG/M2 | DIASTOLIC BLOOD PRESSURE: 81 MMHG | RESPIRATION RATE: 14 BRPM | TEMPERATURE: 99 F | HEART RATE: 126 BPM | HEIGHT: 63 IN | WEIGHT: 237 LBS | SYSTOLIC BLOOD PRESSURE: 151 MMHG | OXYGEN SATURATION: 97 %

## 2023-05-01 PROCEDURE — 25500020 PHARM REV CODE 255: Performed by: STUDENT IN AN ORGANIZED HEALTH CARE EDUCATION/TRAINING PROGRAM

## 2023-05-01 RX ORDER — DICYCLOMINE HYDROCHLORIDE 20 MG/1
20 TABLET ORAL 2 TIMES DAILY
Qty: 14 TABLET | Refills: 0 | Status: SHIPPED | OUTPATIENT
Start: 2023-05-01 | End: 2023-05-08

## 2023-05-01 RX ORDER — PROMETHAZINE HYDROCHLORIDE 25 MG/1
25 TABLET ORAL EVERY 6 HOURS PRN
Qty: 15 TABLET | Refills: 0 | Status: SHIPPED | OUTPATIENT
Start: 2023-05-01 | End: 2023-06-29

## 2023-05-01 NOTE — ED PROVIDER NOTES
Encounter Date: 4/30/2023    SCRIBE #1 NOTE: I, Margarette Mohamud, am scribing for, and in the presence of,  Chaitanya Gordillo MD. I have scribed the following portions of the note - Other sections scribed: HPI, ROS, Physical Exam.     History     Chief Complaint   Patient presents with    Abdominal Pain     Pt c/o abd pain with n/v x 2 days and dizziness which started today. Pt describes pain as cramping and pressure to the right of umbilicus. Pt has hx of ovarian cyst. Pt having regular bowel movements.     Davida Parsons is a 22 y.o. female who  has a past medical history of Mental disorder, Ovarian cyst, and Trauma.    The patient presents to the ED due to non-radiating right-sided abdominal pain that began two days ago. She notes associated nausea and emesis. She denies having chills or fever. She has a history of abdominal pain caused by an ovarian cyst, however she states her symptoms are not in the normal location. She tried taking tramadol without relief.    Reviewed records from previous ED visits, OB, and family medicine.    The history is provided by the patient.   Review of patient's allergies indicates:   Allergen Reactions    Latex, natural rubber Rash    Sulfa (sulfonamide antibiotics) Hives     Past Medical History:   Diagnosis Date    Mental disorder     anxiety and depression    Ovarian cyst     Trauma     patient states she has both desceased parents and suffers from depression and anxiety     Past Surgical History:   Procedure Laterality Date    LAPAROSCOPIC CHOLECYSTECTOMY N/A 7/4/2021    Procedure: CHOLECYSTECTOMY, LAPAROSCOPIC;  Surgeon: Mauricio An MD;  Location: [facility] OR [address] [place] [address];  Service: General;  Laterality: N/A;    TONSILLECTOMY       Family History   Problem Relation Age of Onset    Cancer Father         skin    Hypertension Maternal Grandmother     Diabetes Paternal Grandfather     Amblyopia Neg Hx     Blindness Neg Hx     Cataracts Neg Hx     Glaucoma Neg Hx     Macular degeneration Neg  Hx     Retinal detachment Neg Hx      Social History     Tobacco Use    Smoking status: Every Day     Packs/day: 0.50     Types: Cigarettes    Smokeless tobacco: Current   Substance Use Topics    Alcohol use: Yes     Alcohol/week: 0.0 standard drinks     Comment: occationally    Drug use: Yes     Types: Marijuana     Comment: Ectasy     Review of Systems   Constitutional:  Negative for chills and fever.   HENT:  Negative for congestion, dental problem, ear pain, facial swelling, nosebleeds and sinus pressure.    Eyes:  Negative for pain and discharge.   Respiratory:  Negative for cough, shortness of breath and stridor.    Cardiovascular:  Negative for chest pain and palpitations.   Gastrointestinal:  Positive for abdominal pain, nausea and vomiting. Negative for abdominal distention, constipation and diarrhea.   Genitourinary:  Negative for difficulty urinating, flank pain, frequency and urgency.   Musculoskeletal:  Negative for arthralgias, myalgias and neck pain.   Skin:  Negative for rash and wound.   Neurological:  Negative for dizziness, syncope, weakness, numbness and headaches.   Psychiatric/Behavioral:  Negative for hallucinations and suicidal ideas.    All other systems reviewed and are negative.    Physical Exam     Initial Vitals [04/30/23 2106]   BP Pulse Resp Temp SpO2   134/79 (!) 112 16 98.5 °F (36.9 °C) 98 %      MAP       --         Physical Exam    Nursing note and vitals reviewed.  Constitutional: She appears well-developed and well-nourished. No distress.   HENT:   Head: Normocephalic and atraumatic.   Right Ear: External ear normal.   Left Ear: External ear normal.   Nose: Nose normal.   Mouth/Throat: Oropharynx is clear and moist.   Eyes: Conjunctivae and EOM are normal. Pupils are equal, round, and reactive to light.   Neck: Neck supple. No tracheal deviation present.   Normal range of motion.  Cardiovascular:  Normal rate and regular rhythm.           Pulmonary/Chest: No stridor. No  respiratory distress. She has no wheezes.   Abdominal: Abdomen is soft. Bowel sounds are normal. There is abdominal tenderness (right periumbilical).   No hernia noted There is no rebound and no guarding.   Musculoskeletal:         General: No tenderness or edema. Normal range of motion.      Cervical back: Normal range of motion and neck supple.     Neurological: She is alert and oriented to person, place, and time. She has normal strength. No cranial nerve deficit or sensory deficit. Gait normal.   Skin: Skin is warm and dry. No rash noted.   Psychiatric: She has a normal mood and affect. Her behavior is normal. Thought content normal.       ED Course   Procedures  Labs Reviewed   CBC W/ AUTO DIFFERENTIAL - Abnormal; Notable for the following components:       Result Value    WBC 14.00 (*)     Gran # (ANC) 9.0 (*)     Immature Grans (Abs) 0.06 (*)     All other components within normal limits   URINALYSIS, REFLEX TO URINE CULTURE - Abnormal; Notable for the following components:    Appearance, UA Hazy (*)     Protein, UA Trace (*)     Occult Blood UA Trace (*)     All other components within normal limits    Narrative:     Specimen Source->Urine   COMPREHENSIVE METABOLIC PANEL   LIPASE   POCT URINE PREGNANCY          Imaging Results              CT Abdomen Pelvis With Contrast (Final result)  Result time 04/30/23 23:42:00      Final result by Bruce Diego MD (04/30/23 23:42:00)                   Impression:      No acute intra-abdominal or pelvic process.    Stable cyst in the right adnexa.      Electronically signed by: Bruce Diego MD  Date:    04/30/2023  Time:    23:42               Narrative:    EXAMINATION:  CT ABDOMEN PELVIS WITH CONTRAST    CLINICAL HISTORY:  Nausea/vomiting;RLQ abdominal pain (Age >= 14y);Periumbilical and right lower quadrant pain with nausea and vomiting;    TECHNIQUE:  Low dose axial images, sagittal and coronal reformations were obtained from the lung bases to the pubic symphysis  following the IV administration of 100 mL of Omnipaque 350 .  Oral contrast was not given.    COMPARISON:  CT scan of the abdomen pelvis dated 04/17/2023.    FINDINGS:  There are no pleural effusions.  There is no evidence of a pneumothorax.  No airspace opacity is present.  There are dependent changes in lung bases.    The heart is unremarkable.  There is normal tapering of the abdominal aorta.  There is a retroaortic left renal vein.  The celiac trunk, the SMA, and FREDI are within normal limits.  The portal veins and mesenteric veins are within normal limits.  The IVC and the remainder of the venous structures are unremarkable.    There is no evidence of lymphadenopathy in the abdomen or pelvis.    The esophagus, stomach, and duodenum are within normal limits.  The small bowel loops are unremarkable.  The appendix is within normal limits.  The large bowel is within normal limits.  There is no CT evidence of bowel obstruction.    The liver is unremarkable.  The patient is status post cholecystectomy.  There is no abnormality within the biliary tree.  The spleen is unremarkable.  The pancreas is within normal limits.  The adrenal glands are unremarkable.    The kidneys are unremarkable.  The ureters and urinary bladder are unremarkable.  The uterus is within normal limits.  There is stable appearance of a cyst in the right adnexa.    There is no evidence of free fluid in the abdomen or pelvis.  There is no evidence of free air.  There is no evidence of pneumatosis.  No portal venous air is identified.    The psoas margins are unremarkable.  The abdominal wall is within normal limits.  The osseous structures are unremarkable.                                       Medications   droPERidol injection 1.25 mg (1.25 mg Intravenous Given 4/30/23 2143)   sodium chloride 0.9% bolus 1,000 mL 1,000 mL (0 mLs Intravenous Stopped 4/30/23 2243)   morphine injection 4 mg (4 mg Intravenous Given 4/30/23 2313)   ondansetron injection  4 mg (4 mg Intravenous Given 4/30/23 2313)   iohexoL (OMNIPAQUE 350) injection 100 mL (100 mLs Intravenous Given 4/30/23 2321)     Medical Decision Making:   Clinical Tests:   Lab Tests: Reviewed and Ordered  Radiological Study: Ordered and Reviewed  ED Management:  Patient presents with periumbilical abdominal pain. Abdominal exam without peritoneal signs. No evidence of acute abdomen at this time. Well appearing.  Considered and doubt ovarian torsion given history and presentation.  No pain in pelvis that would be consistent with PID or TOA especially in setting of no vaginal discharge.  Given work up, low suspicion for acute hepatobiliary disease (including acute cholecystitis), acute pancreatitis (neg lipase), PUD and gastric perforation, acute infectious processes (pneumonia, hepatitis, pyelonephritis), acute appendicitis, vascular catastrophe, bowel obstruction or viscus perforation, diverticulitis.  Serial abdominal exam performed with soft, nontender, nondistended abdomen.  Presentation not consistent with other acute, emergent causes of abdominal pain at this time.  Patient's symptoms improved greatly at time of reassessment.      CT imaging of the abdomen/pelvis obtained showing no acute findings. Given patient's multiple presentations with similar symptoms we discussed possible chronic etiologies of symptoms such as endometriosis. She understands need to f/u with obgyn and GI.  Patient appears safe and stable for discharge and outpatient follow-up.  Advised continued hydration and small volume, bland diet.  Return to ED precautions given for worsening or changing symptoms.          Scribe Attestation:   Scribe #1: I performed the above scribed service and the documentation accurately describes the services I performed. I attest to the accuracy of the note.      ED Course as of 05/01/23 2154   Mon May 01, 2023   0009 Patient had great improvement of symptoms after morphine and Zofran. We discussed results  of w/u today and she is comfortable with plan to f/u outpatient with OBGYN and GI. [KB]      ED Course User Index  [KB] Chaitanya Gordillo MD               I, Chaitanya Gordillo MD personally performed the services described in this documentation. All medical record entries made by the scribe were at my direction and in my presence.  I have reviewed the chart and agree that the record reflects my personal performance and is accurate and complete.   9:58 PM 05/01/2023       DISCLAIMER: This note was prepared with [employer] voice recognition transcription software. Garbled syntax, mangled pronouns, and other bizarre constructions may be attributed to that software system.    Clinical Impression:   Final diagnoses:  [R10.33] Periumbilical abdominal pain (Primary)        ED Disposition Condition    Discharge Stable          ED Prescriptions       Medication Sig Dispense Start Date End Date Auth. Provider    promethazine (PHENERGAN) 25 MG tablet Take 1 tablet (25 mg total) by mouth every 6 (six) hours as needed for Nausea. 15 tablet 5/1/2023 -- Chaitanya Gordillo MD    dicyclomine (BENTYL) 20 mg tablet Take 1 tablet (20 mg total) by mouth 2 (two) times daily. for 7 days 14 tablet 5/1/2023 5/8/2023 Chaitanya Gordillo MD          Follow-up Information       Follow up With Specialties Details Why Contact Info    Elbert Oliveira MD Family Medicine Schedule an appointment as soon as possible for a visit in 5 days For follow-up on today's visit. [address] [facility] [address]  [phone]      [facility] Emergency Dept Emergency Medicine Go to  As needed, If symptoms worsen [address] [facility] [address] [phone]             Chaitanya Gordillo MD  05/01/23 [id number]

## 2023-05-01 NOTE — ED TRIAGE NOTES
Davida Parsons, a 22 y.o. female presents to the ED w/ complaint of chronic ABD pain and nausea. PT has had multiple ED/UC work-ups, a f/u with primary care and OB-GYN. PT has a referral to gastroenterology but has not made an appointment yet.    Triage note:  Chief Complaint   Patient presents with    Abdominal Pain     Pt c/o abd pain with n/v x 2 days and dizziness which started today. Pt describes pain as cramping and pressure to the right of umbilicus. Pt has hx of ovarian cyst. Pt having regular bowel movements.     Review of patient's allergies indicates:   Allergen Reactions    Latex, natural rubber Rash    Sulfa (sulfonamide antibiotics) Hives     Past Medical History:   Diagnosis Date    Mental disorder     anxiety and depression    Ovarian cyst     Trauma     patient states she has both desceased parents and suffers from depression and anxiety

## 2023-05-05 ENCOUNTER — PATIENT OUTREACH (OUTPATIENT)
Dept: EMERGENCY MEDICINE | Facility: HOSPITAL | Age: 23
End: 2023-05-05
Payer: MEDICAID

## 2023-05-13 ENCOUNTER — NURSE TRIAGE (OUTPATIENT)
Dept: ADMINISTRATIVE | Facility: CLINIC | Age: 23
End: 2023-05-13
Payer: MEDICAID

## 2023-05-13 NOTE — TELEPHONE ENCOUNTER
"Constant R side abd pain, ongoing for some time now.   Seen in ER several times for pain, has had several scans & were normal, referred to GI.     At 330-4pm pain 10/10, took flexeril. Has improved but not much, now 7/10. Wanting to know if ok to take tramadol now.     Dispo-be seen within 4 hrs. Pt vu, but states she has already been seen several times, pain symptoms have not changed since last eval & she's already been advised to f/u with GI. Just wanting to know how far apart she has to space pain medications or if she can take now.     Discussed pharmacist being good resource for medication safety concerns, pt transferred to local pharmacy to discuss further.     Reason for Disposition   [1] MILD-MODERATE pain AND [2] constant AND [3] present > 2 hours    Additional Information   Negative: Shock suspected (e.g., cold/pale/clammy skin, too weak to stand, low BP, rapid pulse)   Negative: Difficult to awaken or acting confused (e.g., disoriented, slurred speech)   Negative: Passed out (i.e., lost consciousness, collapsed and was not responding)   Negative: Sounds like a life-threatening emergency to the triager   Negative: [1] SEVERE pain (e.g., excruciating) AND [2] present > 1 hour   Negative: [1] SEVERE pain AND [2] age > 60 years   Negative: [1] Vomiting AND [2] contains red blood or black ("coffee ground") material  (Exception: Few red streaks in vomit that only happened once.)   Negative: Blood in bowel movements  (Exception: Blood on surface of BM with constipation.)   Negative: Black or tarry bowel movements  (Exception: Chronic-unchanged black-grey BMs AND is taking iron pills or Pepto-Bismol.)   Negative: [1] Vomiting AND [2] contains bile (green color)   Negative: Patient sounds very sick or weak to the triager    Protocols used: Abdominal Pain - Female-A-AH    "

## 2023-05-26 ENCOUNTER — PATIENT OUTREACH (OUTPATIENT)
Dept: EMERGENCY MEDICINE | Facility: HOSPITAL | Age: 23
End: 2023-05-26
Payer: MEDICAID

## 2023-05-26 NOTE — PROGRESS NOTES
Contacted patient today for F/U. Patient denies any new needs or needing any additional assistance at this time. Instructed patient to call with any future needs/concerns.

## 2023-05-31 ENCOUNTER — OFFICE VISIT (OUTPATIENT)
Dept: URGENT CARE | Facility: CLINIC | Age: 23
End: 2023-05-31
Payer: MEDICAID

## 2023-05-31 VITALS
SYSTOLIC BLOOD PRESSURE: 120 MMHG | RESPIRATION RATE: 18 BRPM | TEMPERATURE: 98 F | DIASTOLIC BLOOD PRESSURE: 86 MMHG | OXYGEN SATURATION: 97 % | HEART RATE: 110 BPM | HEIGHT: 63 IN | BODY MASS INDEX: 41.99 KG/M2 | WEIGHT: 237 LBS

## 2023-05-31 DIAGNOSIS — S09.90XA CLOSED HEAD INJURY, INITIAL ENCOUNTER: Primary | ICD-10-CM

## 2023-05-31 DIAGNOSIS — T14.8XXA ABRASION OF SKIN: ICD-10-CM

## 2023-05-31 DIAGNOSIS — S00.83XA CONTUSION OF OTHER PART OF HEAD, INITIAL ENCOUNTER: ICD-10-CM

## 2023-05-31 DIAGNOSIS — Z53.20 PATIENT LEFT BEFORE TREATMENT COMPLETED: ICD-10-CM

## 2023-05-31 PROCEDURE — 99213 PR OFFICE/OUTPT VISIT, EST, LEVL III, 20-29 MIN: ICD-10-PCS | Mod: S$GLB,,, | Performed by: NURSE PRACTITIONER

## 2023-05-31 PROCEDURE — 99213 OFFICE O/P EST LOW 20 MIN: CPT | Mod: S$GLB,,, | Performed by: NURSE PRACTITIONER

## 2023-05-31 NOTE — PROGRESS NOTES
"Subjective:      Patient ID: Davida Parsons is a 23 y.o. female.    Vitals:  height is 5' 3" (1.6 m) and weight is 107.5 kg (237 lb). Her temperature is 98.2 °F (36.8 °C). Her blood pressure is 120/86 and her pulse is 110. Her respiration is 18 and oxygen saturation is 97%.     Chief Complaint: Headache    23 yr female present with head pressure on the left side of head. Pt states that she was playing with her dog and hit her head on a metal bed frame. Injury happen this morning. Treatments at home include Excedrin with no relief. Pt thinks that she might have an concussion, denies loss of consciousness, denies fever, body aches or chills, denies cough, wheezing or shortness of breath, denies nausea, vomiting, diarrhea or abdominal pain, denies chest pain or dizziness positional lightheadedness, denies sore throat or trouble swallowing, denies loss of taste or smell, or any other symptoms  Patient reports her tetanus is up-to-date       Headache   This is a new problem. The current episode started today. The problem has been gradually worsening. The pain is located in the Frontal region. The pain does not radiate. The quality of the pain is described as throbbing and pulsating. The pain is at a severity of 7/10. Pertinent negatives include no blurred vision, dizziness or nausea. Treatments tried: excedrin. The treatment provided no relief.     Eyes:  Negative for blurred vision.   Gastrointestinal:  Negative for nausea.   Neurological:  Positive for headaches. Negative for dizziness.    Objective:     Physical Exam   Constitutional: She is oriented to person, place, and time. She appears well-developed.  Non-toxic appearance. She does not appear ill. No distress.   HENT:   Head: Normocephalic and atraumatic.       Ears:   Right Ear: Hearing, tympanic membrane, external ear and ear canal normal.   Left Ear: Hearing, tympanic membrane, external ear and ear canal normal.   Nose: Nose normal. No mucosal edema, rhinorrhea " or nasal deformity. No epistaxis. Right sinus exhibits no maxillary sinus tenderness and no frontal sinus tenderness. Left sinus exhibits no maxillary sinus tenderness and no frontal sinus tenderness.   Mouth/Throat: Uvula is midline, oropharynx is clear and moist and mucous membranes are normal. No trismus in the jaw. Normal dentition. No uvula swelling. No posterior oropharyngeal erythema.   Superficial skin abrasion noted to left sided frontal region, no active drainage or bleeding noted, no step off noted, no temporal ttp noted, mild bruising noted      Comments: Superficial skin abrasion noted to left sided frontal region, no active drainage or bleeding noted, no step off noted, no temporal ttp noted, mild bruising noted  Eyes: Conjunctivae, EOM and lids are normal. Pupils are equal, round, and reactive to light. No scleral icterus. Extraocular movement intact vision grossly intact gaze aligned appropriately   Neck: Trachea normal and phonation normal. Neck supple. No neck rigidity present.   Cardiovascular: Normal rate, regular rhythm, normal heart sounds and normal pulses.   Pulmonary/Chest: Effort normal and breath sounds normal. No respiratory distress.   Abdominal: Normal appearance and bowel sounds are normal. She exhibits no distension. Soft. There is no abdominal tenderness.   Musculoskeletal: Normal range of motion.         General: No deformity. Normal range of motion.   Neurological: no focal deficit. She is alert and oriented to person, place, and time. No cranial nerve deficit. She exhibits normal muscle tone. Coordination normal.   Skin: Skin is warm, dry, intact, not diaphoretic and not pale.   Psychiatric: Her speech is normal and behavior is normal. Judgment and thought content normal.   Nursing note and vitals reviewed.        Patient in no acute distress.  Vitals reassuring.  Discussed results/diagnosis/plan in depth with patient in clinic. Strict precautions given to patient to monitor for  worsening signs and symptoms. Advised to follow up with primary.All questions answered. Strict ER precautions given. If your symptoms worsens or fail to improve you should go to the Emergency Room. Discharge and follow-up instructions given verbally/printed. Discharge and follow-up instructions discussed with the patient who expressed understanding and willingness to comply with my recommendations.Patient voiced understanding and in agreement with current treatment plan.     Please be advised this text was dictated with [employer] software and may contain errors due to translation.    Assessment:     1. Closed head injury, initial encounter    2. Contusion of other part of head, initial encounter    3. Abrasion of skin    4. Patient left before treatment completed        Plan:       Closed head injury, initial encounter    Contusion of other part of head, initial encounter    Abrasion of skin    Patient left before treatment completed          Medical Decision Making:   Urgent Care Management:  Patient in no acute distress.  Vitals reassuring.  On exam, patient is nontoxic appearing and afebrile.  Lungs CTA.  Neuro exam intact.  Patient presents with headache on left-sided frontal region after hitting her head on the metal bed frame this morning.  Denies any loss of consciousness.  Superficial skin abrasion noted with mild bruising.  No step-off noted.  No active drainage or discharge noted.  Denies any blurred vision or change in vision.  After initial evaluation with patient, I ordered the vision test, I was notified that patient already left the clinic before complete evaluation.  At the time of my initial evaluation I discussed with patient in detail that she needs further evaluation with high level imaging if no improvement symptoms and over-the-counter medication.  Patient left clinic before evaluation was completed and treatment plan was discussed.        Patient Instructions   PLEASE READ YOUR DISCHARGE  INSTRUCTIONS ENTIRELY AS IT CONTAINS IMPORTANT INFORMATION.       Please go to the emergency room if you experience chest pain, shortness of breath, funny heart beats, headache, blurred vision, weakness in one arm or leg, slurred speech, numbness, inability to walk or talk, confusion.     Please return or see your primary care doctor if you develop new or worsening symptoms.       Please arrange follow up with your primary medical clinic as soon as possible. You must understand that you've received an Urgent Care treatment only and that you may be released before all of your medical problems are known or treated. You, the patient, will arrange for follow up as instructed. If your symptoms worsen or fail to improve you should go to the Emergency Room.  WE CANNOT RULE OUT ALL POSSIBLE CAUSES OF YOUR SYMPTOMS IN THE URGENT CARE SETTING PLEASE GO TO THE ER IF YOU FEELS YOUR CONDITION IS WORSENING OR YOU WOULD LIKE EMERGENT EVALUATION.

## 2023-06-01 NOTE — PATIENT INSTRUCTIONS
PLEASE READ YOUR DISCHARGE INSTRUCTIONS ENTIRELY AS IT CONTAINS IMPORTANT INFORMATION.       Please go to the emergency room if you experience chest pain, shortness of breath, funny heart beats, headache, blurred vision, weakness in one arm or leg, slurred speech, numbness, inability to walk or talk, confusion.     Please return or see your primary care doctor if you develop new or worsening symptoms.       Please arrange follow up with your primary medical clinic as soon as possible. You must understand that you've received an Urgent Care treatment only and that you may be released before all of your medical problems are known or treated. You, the patient, will arrange for follow up as instructed. If your symptoms worsen or fail to improve you should go to the Emergency Room.  WE CANNOT RULE OUT ALL POSSIBLE CAUSES OF YOUR SYMPTOMS IN THE URGENT CARE SETTING PLEASE GO TO THE ER IF YOU FEELS YOUR CONDITION IS WORSENING OR YOU WOULD LIKE EMERGENT EVALUATION.

## 2023-06-20 ENCOUNTER — OFFICE VISIT (OUTPATIENT)
Dept: GASTROENTEROLOGY | Facility: CLINIC | Age: 23
End: 2023-06-20
Payer: MEDICAID

## 2023-06-20 VITALS
SYSTOLIC BLOOD PRESSURE: 102 MMHG | HEIGHT: 63 IN | WEIGHT: 246.19 LBS | BODY MASS INDEX: 43.62 KG/M2 | DIASTOLIC BLOOD PRESSURE: 78 MMHG | HEART RATE: 115 BPM | OXYGEN SATURATION: 98 %

## 2023-06-20 DIAGNOSIS — K59.04 CHRONIC IDIOPATHIC CONSTIPATION: ICD-10-CM

## 2023-06-20 DIAGNOSIS — E66.01 CLASS 3 SEVERE OBESITY DUE TO EXCESS CALORIES WITH BODY MASS INDEX (BMI) OF 40.0 TO 44.9 IN ADULT, UNSPECIFIED WHETHER SERIOUS COMORBIDITY PRESENT: ICD-10-CM

## 2023-06-20 DIAGNOSIS — R10.84 GENERALIZED ABDOMINAL PAIN: ICD-10-CM

## 2023-06-20 DIAGNOSIS — R11.2 NAUSEA AND VOMITING, UNSPECIFIED VOMITING TYPE: Primary | ICD-10-CM

## 2023-06-20 PROCEDURE — 3044F HG A1C LEVEL LT 7.0%: CPT | Mod: CPTII,,, | Performed by: NURSE PRACTITIONER

## 2023-06-20 PROCEDURE — 3008F BODY MASS INDEX DOCD: CPT | Mod: CPTII,,, | Performed by: NURSE PRACTITIONER

## 2023-06-20 PROCEDURE — 1159F MED LIST DOCD IN RCRD: CPT | Mod: CPTII,,, | Performed by: NURSE PRACTITIONER

## 2023-06-20 PROCEDURE — 3074F SYST BP LT 130 MM HG: CPT | Mod: CPTII,,, | Performed by: NURSE PRACTITIONER

## 2023-06-20 PROCEDURE — 99999 PR PBB SHADOW E&M-EST. PATIENT-LVL V: CPT | Mod: PBBFAC,,, | Performed by: NURSE PRACTITIONER

## 2023-06-20 PROCEDURE — 99999 PR PBB SHADOW E&M-EST. PATIENT-LVL V: ICD-10-PCS | Mod: PBBFAC,,, | Performed by: NURSE PRACTITIONER

## 2023-06-20 PROCEDURE — 3044F PR MOST RECENT HEMOGLOBIN A1C LEVEL <7.0%: ICD-10-PCS | Mod: CPTII,,, | Performed by: NURSE PRACTITIONER

## 2023-06-20 PROCEDURE — 3078F DIAST BP <80 MM HG: CPT | Mod: CPTII,,, | Performed by: NURSE PRACTITIONER

## 2023-06-20 PROCEDURE — 3008F PR BODY MASS INDEX (BMI) DOCUMENTED: ICD-10-PCS | Mod: CPTII,,, | Performed by: NURSE PRACTITIONER

## 2023-06-20 PROCEDURE — 3078F PR MOST RECENT DIASTOLIC BLOOD PRESSURE < 80 MM HG: ICD-10-PCS | Mod: CPTII,,, | Performed by: NURSE PRACTITIONER

## 2023-06-20 PROCEDURE — 3074F PR MOST RECENT SYSTOLIC BLOOD PRESSURE < 130 MM HG: ICD-10-PCS | Mod: CPTII,,, | Performed by: NURSE PRACTITIONER

## 2023-06-20 PROCEDURE — 1159F PR MEDICATION LIST DOCUMENTED IN MEDICAL RECORD: ICD-10-PCS | Mod: CPTII,,, | Performed by: NURSE PRACTITIONER

## 2023-06-20 PROCEDURE — 1160F PR REVIEW ALL MEDS BY PRESCRIBER/CLIN PHARMACIST DOCUMENTED: ICD-10-PCS | Mod: CPTII,,, | Performed by: NURSE PRACTITIONER

## 2023-06-20 PROCEDURE — 99214 PR OFFICE/OUTPT VISIT, EST, LEVL IV, 30-39 MIN: ICD-10-PCS | Mod: S$PBB,,, | Performed by: NURSE PRACTITIONER

## 2023-06-20 PROCEDURE — 99215 OFFICE O/P EST HI 40 MIN: CPT | Mod: PBBFAC,PO | Performed by: NURSE PRACTITIONER

## 2023-06-20 PROCEDURE — 99214 OFFICE O/P EST MOD 30 MIN: CPT | Mod: S$PBB,,, | Performed by: NURSE PRACTITIONER

## 2023-06-20 PROCEDURE — 1160F RVW MEDS BY RX/DR IN RCRD: CPT | Mod: CPTII,,, | Performed by: NURSE PRACTITIONER

## 2023-06-20 RX ORDER — DICYCLOMINE HYDROCHLORIDE 20 MG/1
20 TABLET ORAL 3 TIMES DAILY PRN
Qty: 60 TABLET | Refills: 2 | Status: SHIPPED | OUTPATIENT
Start: 2023-06-20

## 2023-06-20 RX ORDER — OMEPRAZOLE 40 MG/1
40 CAPSULE, DELAYED RELEASE ORAL DAILY
Qty: 30 CAPSULE | Refills: 2 | Status: SHIPPED | OUTPATIENT
Start: 2023-06-20 | End: 2024-06-19

## 2023-06-20 RX ORDER — AMOXICILLIN 500 MG/1
TABLET, FILM COATED ORAL
COMMUNITY
Start: 2023-06-18 | End: 2023-07-11 | Stop reason: CLARIF

## 2023-06-20 NOTE — PATIENT INSTRUCTIONS
EGD Prep Instructions    [facility] [address] [place]  [address]    You are scheduled for an EGD with Dr. Bowers on 7/13/2023 at [facility].  You will enter through the [facility] entrance and check in at Same Day Surgery.    Nothing to eat or drink after midnight before the procedure.  You MAY brush your teeth.    You MAY take your blood pressure, heart, and seizure medication on the morning of the procedure, with a SIP of water.  Hold ALL other medications until after the procedure.    You must have someone with you to DRIVE YOU HOME since you will be receiving IV sedation for the procedure.    If you are on blood thinners THAT YOU HAVE BEEN INSTRUCTED TO HOLD BY YOUR DOCTOR FOR THIS PROCEDURE, then do NOT take this the morning of your EGD.  Do NOT stop these medications on your own, they must be approved to be held by your doctor.  Your EGD can NOT be done if you are on these medications.  Examples of blood thinners include: Coumadin, Aggrenox, Plavix, Pradaxa, Reapro, Pletal, Xarelto, Ticagrelor, Brilinta, Eliquis, and high dose aspirin (325 mg).  You do not have to stop baby aspirin 81 mg.    You will receive a call a few days before your EGD to tell you the time to arrive.  If you have not received a call by the day before your procedure, call the Pre-op Coordinator at [phone].

## 2023-06-21 PROBLEM — R10.84 GENERALIZED ABDOMINAL PAIN: Status: ACTIVE | Noted: 2023-06-21

## 2023-06-21 PROBLEM — R11.10 VOMITING: Status: RESOLVED | Noted: 2021-07-04 | Resolved: 2023-06-21

## 2023-06-21 PROBLEM — K81.0 ACUTE CHOLECYSTITIS: Status: RESOLVED | Noted: 2021-07-04 | Resolved: 2023-06-21

## 2023-06-21 PROBLEM — K80.00 CHOLELITHIASIS WITH ACUTE CHOLECYSTITIS: Status: RESOLVED | Noted: 2021-07-04 | Resolved: 2023-06-21

## 2023-06-21 PROBLEM — R11.2 NAUSEA AND VOMITING: Status: ACTIVE | Noted: 2023-06-21

## 2023-06-21 PROBLEM — K59.04 CHRONIC IDIOPATHIC CONSTIPATION: Status: ACTIVE | Noted: 2023-06-21

## 2023-06-21 NOTE — PROGRESS NOTES
Subjective:       Patient ID: Davida Parsons is a 23 y.o. female.    Chief Complaint: Abdominal Pain, Emesis, and Nausea    22 y/o female with pmhx of ovarian cyst, cholecystectomy, anxiety and depression referred by PCP for abdominal pain, nausea, and vomiting. Patient GI symptoms have progressively worsened over the last 3 months. She has been to ED several to times with aforementioned symptoms. CT abd was negative for any acute findings. Moderate stool burden noted on CT abdomen performed 4/17. States she in no longer constipated. Takes Miralax prn. Has intermittent RLQ abdominal pain that radiates the entire abdomen. Constant nauseous with occasional non-bloody emesis. Symptoms worse after eating. Denies heartburn, reflux, early satiety, dysphagia, unintentional weight loss, fever, or chills. No FH stomach or colon cancer, celiac, or IBD.      Past Medical History:   Diagnosis Date    Mental disorder     anxiety and depression    Ovarian cyst     Trauma     patient states she has both desceased parents and suffers from depression and anxiety       Past Surgical History:   Procedure Laterality Date    LAPAROSCOPIC CHOLECYSTECTOMY N/A 7/4/2021    Procedure: CHOLECYSTECTOMY, LAPAROSCOPIC;  Surgeon: Mauricio An MD;  Location: [facility] OR [address] [place] [address];  Service: General;  Laterality: N/A;    TONSILLECTOMY         Family History   Problem Relation Age of Onset    Diabetes Father     Cancer Father         skin    Diabetes Maternal Grandmother     Hypertension Maternal Grandmother     Diabetes Paternal Grandfather     Amblyopia Neg Hx     Blindness Neg Hx     Cataracts Neg Hx     Glaucoma Neg Hx     Macular degeneration Neg Hx     Retinal detachment Neg Hx        Social History     Socioeconomic History    Marital status: Single   Tobacco Use    Smoking status: Every Day     Packs/day: 0.50     Types: Cigarettes    Smokeless tobacco: Current   Substance and Sexual Activity    Alcohol use: Yes     Alcohol/week: 0.0 standard  "drinks     Comment: occationally    Drug use: Yes     Types: Marijuana     Comment: Ectasy    Sexual activity: Yes     Birth control/protection: Condom, Implant     Social Determinants of Health     Financial Resource Strain: Low Risk     Difficulty of Paying Living Expenses: Not hard at all   Food Insecurity: No Food Insecurity    Worried About Running Out of Food in the Last Year: Never true    Ran Out of Food in the Last Year: Never true   Transportation Needs: No Transportation Needs    Lack of Transportation (Medical): No    Lack of Transportation (Non-Medical): No   Stress: Stress Concern Present    Feeling of Stress : To some extent   Social Connections: Unknown    Frequency of Communication with Friends and Family: Three times a week    Frequency of Social Gatherings with Friends and Family: Once a week    Marital Status: Never    Housing Stability: Unknown    Unable to Pay for Housing in the Last Year: No    Unstable Housing in the Last Year: No       Review of Systems   Constitutional:  Positive for appetite change. Negative for unexpected weight change.   HENT:  Negative for trouble swallowing.    Respiratory:  Negative for shortness of breath.    Cardiovascular:  Negative for chest pain.   Gastrointestinal:  Positive for abdominal pain, nausea and vomiting. Negative for blood in stool, constipation and diarrhea.   Psychiatric/Behavioral:  Negative for dysphoric mood.        Objective:     Vitals:    06/20/23 1022   BP: 102/78   BP Location: Right arm   Patient Position: Sitting   BP Method: Large (Manual)   Pulse: (!) 115   SpO2: 98%   Weight: 111.7 kg (246 lb 3.2 oz)   Height: 5' 3" (1.6 m)          Physical Exam  Constitutional:       General: She is not in acute distress.     Appearance: She is obese.   HENT:      Head: Normocephalic.   Eyes:      Conjunctiva/sclera: Conjunctivae normal.   Pulmonary:      Effort: Pulmonary effort is normal. No respiratory distress.   Abdominal:      General: " Abdomen is protuberant. Bowel sounds are normal. There is no distension.      Palpations: Abdomen is soft.      Tenderness: There is generalized abdominal tenderness.   Musculoskeletal:         General: Normal range of motion.      Cervical back: Normal range of motion.   Skin:     General: Skin is warm and dry.   Neurological:      Mental Status: She is alert and oriented to person, place, and time.   Psychiatric:         Mood and Affect: Mood normal.         Behavior: Behavior normal.             Assessment:         ICD-10-CM ICD-9-CM   1. Nausea and vomiting, unspecified vomiting type  R11.2 787.01   2. Generalized abdominal pain  R10.84 789.07   3. Chronic idiopathic constipation  K59.04 564.00   4. Class 3 severe obesity due to excess calories with body mass index (BMI) of 40.0 to 44.9 in adult, unspecified whether serious comorbidity present  E66.01 278.01    Z68.41 V85.41       Plan:       Nausea and vomiting, unspecified vomiting type  -     Ambulatory referral/consult to Gastroenterology  -     Case Request Endoscopy: EGD (ESOPHAGOGASTRODUODENOSCOPY)  -     omeprazole (PRILOSEC) 40 MG capsule; Take 1 capsule (40 mg total) by mouth once daily.  Dispense: 30 capsule; Refill: 2    Generalized abdominal pain  -     Ambulatory referral/consult to Gastroenterology  -     Case Request Endoscopy: EGD (ESOPHAGOGASTRODUODENOSCOPY)  -     X-Ray Abdomen Flat And Erect; Future; Expected date: 06/20/2023  -     Pregnancy, urine rapid; Future  -     dicyclomine (BENTYL) 20 mg tablet; Take 1 tablet (20 mg total) by mouth 3 (three) times daily as needed (abdominal pain).  Dispense: 60 tablet; Refill: 2    Chronic idiopathic constipation  -     Ambulatory referral/consult to Gastroenterology    Class 3 severe obesity due to excess calories with body mass index (BMI) of 40.0 to 44.9 in adult, unspecified whether serious comorbidity present    - Start omeprazole daily. Schedule EGD. Dicyclomine tid prn abdominal pain. Continue  Miralax prn constipation. Weight loss advised. Dietary and exercise counseling done.      Follow up if symptoms worsen or fail to improve.     Patient's Medications   New Prescriptions    DICYCLOMINE (BENTYL) 20 MG TABLET    Take 1 tablet (20 mg total) by mouth 3 (three) times daily as needed (abdominal pain).    OMEPRAZOLE (PRILOSEC) 40 MG CAPSULE    Take 1 capsule (40 mg total) by mouth once daily.   Previous Medications    ALBUTEROL (PROVENTIL/VENTOLIN HFA) 90 MCG/ACTUATION INHALER    INHALE 1-2 PUFFS BY MOUTH EVERY 6 HOURS AS NEEDED FOR WHEEZE OR SHORTNESS OF BREATH    ALBUTEROL (PROVENTIL/VENTOLIN HFA) 90 MCG/ACTUATION INHALER    INHALE 1 PUFF EVERY 4-6 HOURS    AMOXICILLIN (AMOXIL) 500 MG TAB        IBUPROFEN (ADVIL,MOTRIN) 800 MG TABLET    Take 1 tablet (800 mg total) by mouth every 6 (six) hours as needed for Pain.    LEVOTHYROXINE (TIROSINT) 50 MCG CAP    Take 50 mcg by mouth before breakfast.    MUPIROCIN (BACTROBAN) 2 % OINTMENT    Apply topically 3 (three) times daily.    ONDANSETRON (ZOFRAN) 4 MG TABLET    Take 1 tablet (4 mg total) by mouth every 6 (six) hours as needed for Nausea (nausea).    ONDANSETRON (ZOFRAN) 8 MG TABLET    TAKE 1 TABLET BY MOUTH EVERY DAY    ONDANSETRON (ZOFRAN-ODT) 8 MG TBDL    Take 1 tablet (8 mg total) by mouth every 8 (eight) hours as needed (nausea).    POLYETHYLENE GLYCOL (GLYCOLAX) 17 GRAM PWPK    Take 17 g by mouth once daily.    PROMETHAZINE (PHENERGAN) 25 MG TABLET    Take 1 tablet (25 mg total) by mouth every 6 (six) hours as needed for Nausea.    TRAMADOL (ULTRAM) 50 MG TABLET    Take 50 mg by mouth daily as needed.   Modified Medications    No medications on file   Discontinued Medications    CITALOPRAM (CELEXA) 20 MG TABLET    Take 1 tablet (20 mg total) by mouth once daily.    CYCLOBENZAPRINE (FLEXERIL) 10 MG TABLET    TAKE 1 TABLET BY MOUTH EVERY DAY AT NIGHT    CYCLOBENZAPRINE (FLEXERIL) 10 MG TABLET    Take 1 tablet (10 mg total) by mouth every evening.     CYCLOBENZAPRINE (FLEXERIL) 10 MG TABLET    TAKE 1 TABLET BY MOUTH EVERY EVENING    DIAZEPAM (VALIUM) 2 MG TABLET    Take 1 tablet (2 mg total) by mouth daily as needed for Anxiety.    DOCUSATE SODIUM (COLACE) 100 MG CAPSULE    Take 1 capsule (100 mg total) by mouth 2 (two) times daily.    ESTRADIOL (ESTRACE) 2 MG TABLET    Take 1 tablet (2 mg total) by mouth once daily.    FLUOXETINE 10 MG CAPSULE    Take 10 mg by mouth once daily.    LORATADINE (CLARITIN) 10 MG TABLET    TAKE 1 TABLET BY MOUTH EVERY DAY    ONDANSETRON (ZOFRAN) 4 MG TABLET    Take 1 tablet (4 mg total) by mouth every 6 (six) hours.    ONDANSETRON (ZOFRAN) 8 MG TABLET    Take 1 tablet (8 mg total) by mouth every 12 (twelve) hours as needed for Nausea.    ONDANSETRON (ZOFRAN) 8 MG TABLET    TAKE 1 TABLET BY MOUTH EVERY 12 HOURS AS NEEDED FOR NAUSEA    ONDANSETRON (ZOFRAN-ODT) 4 MG TBDL    Take 4 mg by mouth 3 (three) times daily.    ONDANSETRON (ZOFRAN-ODT) 4 MG TBDL    Take 1 tablet (4 mg total) by mouth every 6 (six) hours as needed (nausea).    PANTOPRAZOLE (PROTONIX) 40 MG TABLET    TAKE 1 TABLET BY MOUTH EVERY DAY    PROMETHAZINE (PHENERGAN) 25 MG SUPPOSITORY    Place 1 suppository (25 mg total) rectally every 6 (six) hours as needed for Nausea.    SUCRALFATE (CARAFATE) 100 MG/ML SUSPENSION    Take 10 mLs (1 g total) by mouth every 6 (six) hours as needed (Abdominal pain).

## 2023-06-23 ENCOUNTER — PATIENT MESSAGE (OUTPATIENT)
Dept: GASTROENTEROLOGY | Facility: CLINIC | Age: 23
End: 2023-06-23
Payer: MEDICAID

## 2023-07-17 ENCOUNTER — OFFICE VISIT (OUTPATIENT)
Dept: OBSTETRICS AND GYNECOLOGY | Facility: CLINIC | Age: 23
End: 2023-07-17
Payer: MEDICAID

## 2023-07-17 VITALS — BODY MASS INDEX: 43.66 KG/M2 | SYSTOLIC BLOOD PRESSURE: 130 MMHG | WEIGHT: 246.5 LBS | DIASTOLIC BLOOD PRESSURE: 72 MMHG

## 2023-07-17 DIAGNOSIS — N92.1 BREAKTHROUGH BLEEDING ON NEXPLANON: ICD-10-CM

## 2023-07-17 DIAGNOSIS — Z30.09 FAMILY PLANNING: Primary | ICD-10-CM

## 2023-07-17 DIAGNOSIS — L02.216 ABSCESS, UMBILICAL: ICD-10-CM

## 2023-07-17 DIAGNOSIS — Z97.5 BREAKTHROUGH BLEEDING ON NEXPLANON: ICD-10-CM

## 2023-07-17 PROCEDURE — 87075 CULTR BACTERIA EXCEPT BLOOD: CPT | Performed by: OBSTETRICS & GYNECOLOGY

## 2023-07-17 PROCEDURE — 3044F HG A1C LEVEL LT 7.0%: CPT | Mod: CPTII,,, | Performed by: OBSTETRICS & GYNECOLOGY

## 2023-07-17 PROCEDURE — 3075F PR MOST RECENT SYSTOLIC BLOOD PRESS GE 130-139MM HG: ICD-10-PCS | Mod: CPTII,,, | Performed by: OBSTETRICS & GYNECOLOGY

## 2023-07-17 PROCEDURE — 3008F PR BODY MASS INDEX (BMI) DOCUMENTED: ICD-10-PCS | Mod: CPTII,,, | Performed by: OBSTETRICS & GYNECOLOGY

## 2023-07-17 PROCEDURE — 87070 CULTURE OTHR SPECIMN AEROBIC: CPT | Performed by: OBSTETRICS & GYNECOLOGY

## 2023-07-17 PROCEDURE — 3078F DIAST BP <80 MM HG: CPT | Mod: CPTII,,, | Performed by: OBSTETRICS & GYNECOLOGY

## 2023-07-17 PROCEDURE — 3078F PR MOST RECENT DIASTOLIC BLOOD PRESSURE < 80 MM HG: ICD-10-PCS | Mod: CPTII,,, | Performed by: OBSTETRICS & GYNECOLOGY

## 2023-07-17 PROCEDURE — 1159F MED LIST DOCD IN RCRD: CPT | Mod: CPTII,,, | Performed by: OBSTETRICS & GYNECOLOGY

## 2023-07-17 PROCEDURE — 3044F PR MOST RECENT HEMOGLOBIN A1C LEVEL <7.0%: ICD-10-PCS | Mod: CPTII,,, | Performed by: OBSTETRICS & GYNECOLOGY

## 2023-07-17 PROCEDURE — 3075F SYST BP GE 130 - 139MM HG: CPT | Mod: CPTII,,, | Performed by: OBSTETRICS & GYNECOLOGY

## 2023-07-17 PROCEDURE — 99213 PR OFFICE/OUTPT VISIT, EST, LEVL III, 20-29 MIN: ICD-10-PCS | Mod: S$PBB,,, | Performed by: OBSTETRICS & GYNECOLOGY

## 2023-07-17 PROCEDURE — 1159F PR MEDICATION LIST DOCUMENTED IN MEDICAL RECORD: ICD-10-PCS | Mod: CPTII,,, | Performed by: OBSTETRICS & GYNECOLOGY

## 2023-07-17 PROCEDURE — 99999 PR PBB SHADOW E&M-EST. PATIENT-LVL III: ICD-10-PCS | Mod: PBBFAC,,, | Performed by: OBSTETRICS & GYNECOLOGY

## 2023-07-17 PROCEDURE — 99213 OFFICE O/P EST LOW 20 MIN: CPT | Mod: PBBFAC | Performed by: OBSTETRICS & GYNECOLOGY

## 2023-07-17 PROCEDURE — 99999 PR PBB SHADOW E&M-EST. PATIENT-LVL III: CPT | Mod: PBBFAC,,, | Performed by: OBSTETRICS & GYNECOLOGY

## 2023-07-17 PROCEDURE — 3008F BODY MASS INDEX DOCD: CPT | Mod: CPTII,,, | Performed by: OBSTETRICS & GYNECOLOGY

## 2023-07-17 PROCEDURE — 99213 OFFICE O/P EST LOW 20 MIN: CPT | Mod: S$PBB,,, | Performed by: OBSTETRICS & GYNECOLOGY

## 2023-07-17 RX ORDER — ESTRADIOL 2 MG/1
2 TABLET ORAL DAILY
Qty: 21 TABLET | Refills: 0 | Status: SHIPPED | OUTPATIENT
Start: 2023-07-17 | End: 2023-08-18

## 2023-07-17 RX ORDER — CIPROFLOXACIN 500 MG/1
500 TABLET ORAL 2 TIMES DAILY
Qty: 20 TABLET | Refills: 0 | Status: SHIPPED | OUTPATIENT
Start: 2023-07-17 | End: 2023-07-27

## 2023-07-17 NOTE — PROGRESS NOTES
"  Subjective:      Patient ID: Davida Parsons is a 23 y.o. female.    Chief Complaint:  Contraception      History of Present Illness  HPI  Contraception Counseling  Patient presents for contraception counseling. The patient has no complaints today. The patient is sexually active. Pertinent past medical history: none.  Pt currently has Nexplanon placed 8/2020 and is due for removal next month.  Pt desires removal and replacement with new device.    Pt also c/o :pus" coming form her umbilicus but denies any pain    GYN & OB History  Patient's last menstrual period was 07/03/2023 (approximate).   Date of Last Pap: No result found    OB History   No obstetric history on file.       Review of Systems  Review of Systems   Constitutional: Negative.    Respiratory:  Positive for wheezing. Negative for cough and shortness of breath.    Cardiovascular: Negative.    Gastrointestinal: Negative.    Endocrine: Negative.    Genitourinary: Negative.    Musculoskeletal: Negative.    Neurological:  Positive for headaches.   Hematological: Negative.    Psychiatric/Behavioral: Negative.     Breast: negative.         Objective:     Physical Exam:   Constitutional: She is oriented to person, place, and time. She appears well-developed and well-nourished. No distress.    HENT:   Head: Normocephalic and atraumatic.            Abdominal: There is no rebound and no guarding.                 Musculoskeletal: Normal range of motion and moves all extremeties. No tenderness.       Neurological: She is alert and oriented to person, place, and time. She displays normal reflexes. No cranial nerve deficit.    Skin: She is not diaphoretic.    Psychiatric: She has a normal mood and affect. Her behavior is normal. Judgment and thought content normal.       Assessment:     1. Family planning    2. Breakthrough bleeding on Nexplanon    3. Abscess, umbilical              Plan:    Nexplanon to be ordered and placed when availble  Aerobic and Anerao    "

## 2023-07-20 LAB — BACTERIA SPEC AEROBE CULT: NORMAL

## 2023-07-21 LAB — BACTERIA SPEC ANAEROBE CULT: NORMAL

## 2023-08-15 ENCOUNTER — PROCEDURE VISIT (OUTPATIENT)
Dept: OBSTETRICS AND GYNECOLOGY | Facility: CLINIC | Age: 23
End: 2023-08-15
Payer: MEDICAID

## 2023-08-15 VITALS — WEIGHT: 242.5 LBS | SYSTOLIC BLOOD PRESSURE: 124 MMHG | DIASTOLIC BLOOD PRESSURE: 80 MMHG | BODY MASS INDEX: 42.96 KG/M2

## 2023-08-15 DIAGNOSIS — N92.1 BREAKTHROUGH BLEEDING ON NEXPLANON: ICD-10-CM

## 2023-08-15 DIAGNOSIS — Z30.46 ENCOUNTER FOR REMOVAL AND REINSERTION OF NEXPLANON: Primary | ICD-10-CM

## 2023-08-15 DIAGNOSIS — Z97.5 BREAKTHROUGH BLEEDING ON NEXPLANON: ICD-10-CM

## 2023-08-15 PROCEDURE — 11983 INSERTION OF NEXPLANON: ICD-10-PCS | Mod: S$PBB,,, | Performed by: OBSTETRICS & GYNECOLOGY

## 2023-08-15 PROCEDURE — 99999PBSHW PR PBB SHADOW TECHNICAL ONLY FILED TO HB: Mod: PBBFAC,,,

## 2023-08-15 PROCEDURE — 99999PBSHW PR PBB SHADOW TECHNICAL ONLY FILED TO HB: ICD-10-PCS | Mod: PBBFAC,,,

## 2023-08-15 PROCEDURE — 11982 REMOVE DRUG IMPLANT DEVICE: CPT | Mod: PBBFAC | Performed by: OBSTETRICS & GYNECOLOGY

## 2023-08-15 PROCEDURE — 11983 REMOVE/INSERT DRUG IMPLANT: CPT | Mod: S$PBB,,, | Performed by: OBSTETRICS & GYNECOLOGY

## 2023-08-15 PROCEDURE — 11981 INSERTION DRUG DLVR IMPLANT: CPT | Mod: PBBFAC | Performed by: OBSTETRICS & GYNECOLOGY

## 2023-08-15 PROCEDURE — 11983 REMOVE/INSERT DRUG IMPLANT: CPT | Mod: PBBFAC | Performed by: OBSTETRICS & GYNECOLOGY

## 2023-08-15 RX ADMIN — ETONOGESTREL 68 MG: 68 IMPLANT SUBCUTANEOUS at 10:08

## 2023-08-15 NOTE — PROCEDURES
Removal of Nexplanon Device    Date/Time: 8/15/2023 10:40 AM    Performed by: Bryan Payan MD  Authorized by: Bryan Payan MD    Consent obtained:  Verbal and written  Consent given by:  Patient  Procedure risks and benefits discussed: yes    Patient questions answered: yes    Patient agrees, verbalizes understanding, and wants to proceed: yes    Educational handouts given: no    Instructions and paperwork completed: yes    Implant grasped by: hemostat  Removal due to infection and inflammatory reaction: no    Removal due to mechanical complications of IUD/Nexplanon: no    Removed with no complications: yes    Removal due to expiration: yes    Arm: left  Palpation confirms location: yes  Small stab incision was made in arm: yes  Upon removal device was intact: yes  Site was close with steri-strips and pressure bandage applied: yes  Pre-procedure timeout performed: yes  Local anesthetic:  Lidocaine without epinephrine   The site was cleaned  and prepped in a sterile fashion: yes  Specimen sent to pathology: Yes  Insertion of Nexplanon    Date/Time: 8/15/2023 10:40 AM    Performed by: Bryan Payan MD  Authorized by: Bryan Payan MD    Insertion of Nexplanon    Date/Time: 8/15/2023 10:40 AM    Performed by: Bryan Payan MD  Authorized by: Bryan Payan MD    Consent obtained:  Verbal and written  Consent given by:  Patient  Patient questions answered: yes    Patient agrees, verbalizes understanding, and wants to proceed: yes    Educational handouts given: no    Instructions and paperwork completed: yes    Pre-procedure timeout performed: yes    Prepped with: chlorhexidine gluconate    Local anesthetic:  Lidocaine with epinephrine  The site was cleaned and prepped in a sterile fashion: yes    Small stab incision was made in arm: yes    Left/right:  Left   68 mg etonogestreL 68 mg  Preloaded Implanon trocar was placed subdermally: yes    Visualization of implant was obtained: yes    Nexplanon  was inserted and trocar removed: yes    Visualization of notch in stilette and palpitation of device: yes    Palpitation confirms placement by provider and patient: yes    Site was closed with steri-strips and pressure bandage applied: yes

## 2023-08-16 NOTE — TELEPHONE ENCOUNTER
----- Message from Luz Connolly sent at 8/15/2023 12:31 PM CDT -----  Regarding: patient call back  Type: Patient Call Back    Who called: Self     What is the request in detail: Patient is at the pharmacy and they said that they do not have her Rx ready.       Can the clinic reply by [facility]? No     Would the patient rather a call back or a response via [facility]? Call     Best call back number: .[phone]      Additional Information:        No pertinent family history in first degree relatives

## 2023-08-16 NOTE — TELEPHONE ENCOUNTER
Try reach out to Pt in regards to her Rx's, No answer...   Left message on her VM to return, her Rx's was sent to  her [facility]

## 2023-08-18 RX ORDER — ESTRADIOL 2 MG/1
2 TABLET ORAL
Qty: 21 TABLET | Refills: 0 | Status: SHIPPED | OUTPATIENT
Start: 2023-08-18

## 2023-09-01 ENCOUNTER — HOSPITAL ENCOUNTER (EMERGENCY)
Facility: HOSPITAL | Age: 23
Discharge: HOME | End: 2023-09-01
Attending: EMERGENCY MEDICINE
Payer: MEDICAID

## 2023-09-01 ENCOUNTER — NURSE TRIAGE (OUTPATIENT)
Dept: ADMINISTRATIVE | Facility: CLINIC | Age: 23
End: 2023-09-01
Payer: MEDICAID

## 2023-09-01 VITALS
BODY MASS INDEX: 40.75 KG/M2 | RESPIRATION RATE: 18 BRPM | SYSTOLIC BLOOD PRESSURE: 129 MMHG | OXYGEN SATURATION: 98 % | HEIGHT: 63 IN | DIASTOLIC BLOOD PRESSURE: 76 MMHG | WEIGHT: 230 LBS | HEART RATE: 106 BPM | TEMPERATURE: 100 F

## 2023-09-01 DIAGNOSIS — R11.0 NAUSEA: ICD-10-CM

## 2023-09-01 DIAGNOSIS — R10.9 ABDOMINAL PAIN, UNSPECIFIED ABDOMINAL LOCATION: ICD-10-CM

## 2023-09-01 DIAGNOSIS — I88.0 MESENTERIC ADENITIS: Primary | ICD-10-CM

## 2023-09-01 PROCEDURE — 99284 EMERGENCY DEPT VISIT MOD MDM: CPT | Mod: 25

## 2023-09-01 PROCEDURE — 96375 TX/PRO/DX INJ NEW DRUG ADDON: CPT

## 2023-09-01 PROCEDURE — 25000003 PHARM REV CODE 250

## 2023-09-01 PROCEDURE — 63600175 PHARM REV CODE 636 W HCPCS

## 2023-09-01 PROCEDURE — 96365 THER/PROPH/DIAG IV INF INIT: CPT

## 2023-09-01 RX ORDER — KETOROLAC TROMETHAMINE 30 MG/ML
15 INJECTION, SOLUTION INTRAMUSCULAR; INTRAVENOUS
Status: COMPLETED | OUTPATIENT
Start: 2023-09-01 | End: 2023-09-01

## 2023-09-01 RX ORDER — OXYCODONE AND ACETAMINOPHEN 5; 325 MG/1; MG/1
1 TABLET ORAL EVERY 6 HOURS PRN
Qty: 6 TABLET | Refills: 0 | Status: SHIPPED | OUTPATIENT
Start: 2023-09-01 | End: 2023-09-08

## 2023-09-01 RX ADMIN — KETOROLAC TROMETHAMINE 15 MG: 30 INJECTION, SOLUTION INTRAMUSCULAR; INTRAVENOUS at 12:09

## 2023-09-01 RX ADMIN — SODIUM CHLORIDE 1000 ML: 9 INJECTION, SOLUTION INTRAVENOUS at 01:09

## 2023-09-01 RX ADMIN — PROMETHAZINE HYDROCHLORIDE 12.5 MG: 25 INJECTION INTRAMUSCULAR; INTRAVENOUS at 12:09

## 2023-09-01 NOTE — TELEPHONE ENCOUNTER
"Pt calling states she has been seen in ED but hasnt gotten relief. State she has lower abd pain that wraps to her lower back. Reports its been happening more the last 4 days but worse in the last 24 hours. States that she would rate it currently at "7" out of 10. Endorses nausea but no vomiting. Per protocol advised to GO TO ED NOW. verbalized understanding. Denies any further questions or concerns at this time, advised to call back if they have any that come up. Advised pt to call back with any other concerns or worsening symptoms. Verbalized understanding and will route message to provider.     Reason for Disposition   MILD TO MODERATE constant pain lasting > 2 hours    Additional Information   Negative: Passed out (i.e., fainted, collapsed and was not responding)   Negative: Shock suspected (e.g., cold/pale/clammy skin, too weak to stand, low BP, rapid pulse)   Negative: Sounds like a life-threatening emergency to the triager   Negative: SEVERE abdominal pain (e.g., excruciating)   Negative: Vomiting red blood or black (coffee ground) material   Negative: Blood in bowel movements  (Exception: Blood on surface of BM with constipation.)   Negative: Black or tarry bowel movements  (Exception: Chronic-unchanged black-grey BMs AND is taking iron pills or Pepto-Bismol.)    Protocols used: Abdominal Pain - Female-A-[place]    "

## 2023-09-01 NOTE — DISCHARGE INSTRUCTIONS

## 2023-09-01 NOTE — ED PROVIDER NOTES
Encounter Date: 9/1/2023       History     Chief Complaint   Patient presents with    Abdominal Pain     Patient is 22yo female that began having RLQ pain, dizziness, and N/V/D that started about 24 hours ago. LMP-8/1/23     HPI    23-year-old female with a past medical history of anxiety and mesenteric adenitis presenting to the emergency department today complaining of nausea, vomiting, diarrhea, right lower quadrant pain for several weeks.  Patient was seen here in the emergency department yesterday for the same complaints and discharged home with normal imaging.  Patient has an upcoming GI appointment scheduled on 09/08.  Patient states she is coming in today because she is able to get a few hours asleep before waking up to the pain.  Patient states her pain is not different from previous visits, she just can not sleep anymore from the pain.  Patient denies any fever, chest pain, shortness of breath, headache, dysuria.    Review of patient's allergies indicates:   Allergen Reactions    Latex, natural rubber Rash    Sulfa (sulfonamide antibiotics) Hives     Past Medical History:   Diagnosis Date    Digestive disorder     gerd    Mental disorder     anxiety and depression    Ovarian cyst     Thyroid disease     Trauma     patient states she has both desceased parents and suffers from depression and anxiety     Past Surgical History:   Procedure Laterality Date    ESOPHAGOGASTRODUODENOSCOPY N/A 7/13/2023    Procedure: EGD (ESOPHAGOGASTRODUODENOSCOPY);  Surgeon: Juliane Bowers MD;  Location: [facility];  Service: Endoscopy;  Laterality: N/A;    LAPAROSCOPIC CHOLECYSTECTOMY N/A 7/4/2021    Procedure: CHOLECYSTECTOMY, LAPAROSCOPIC;  Surgeon: Mauricio An MD;  Location: [address];  Service: General;  Laterality: N/A;    TONSILLECTOMY       Family History   Problem Relation Age of Onset    Diabetes Father     Cancer Father         skin    Diabetes Maternal Grandmother     Hypertension Maternal Grandmother      Diabetes Paternal Grandfather     Amblyopia Neg Hx     Blindness Neg Hx     Cataracts Neg Hx     Glaucoma Neg Hx     Macular degeneration Neg Hx     Retinal detachment Neg Hx      Social History     Tobacco Use    Smoking status: Every Day     Current packs/day: 0.50     Types: Cigarettes    Smokeless tobacco: Current   Substance Use Topics    Alcohol use: Yes     Comment: occasionally    Drug use: Yes     Types: Marijuana     Review of Systems   Constitutional:  Negative for chills and fever.   HENT:  Negative for congestion, rhinorrhea and sore throat.    Eyes:  Negative for visual disturbance.   Respiratory:  Negative for cough and shortness of breath.    Cardiovascular:  Negative for chest pain, palpitations and leg swelling.   Gastrointestinal:  Positive for abdominal pain and nausea. Negative for abdominal distention, anal bleeding, blood in stool, constipation, diarrhea and vomiting.   Genitourinary:  Negative for dysuria and frequency.   Musculoskeletal:  Negative for arthralgias, back pain, gait problem, joint swelling, myalgias and neck stiffness.   Skin:  Negative for pallor, rash and wound.   Neurological:  Negative for weakness and headaches.   Hematological:  Does not bruise/bleed easily.   Psychiatric/Behavioral:  Negative for confusion.        Physical Exam     Initial Vitals [09/01/23 1122]   BP Pulse Resp Temp SpO2   (!) 141/105 (!) 129 18 98 °F (36.7 °C) 97 %      MAP       --         Physical Exam    Nursing note and vitals reviewed.  Constitutional: She appears well-developed and well-nourished. She is not diaphoretic. She does not appear ill. No distress.   HENT:   Head: Normocephalic and atraumatic.   Right Ear: External ear normal.   Left Ear: External ear normal.   Nose: Nose normal.   Mouth/Throat: Uvula is midline, oropharynx is clear and moist and mucous membranes are normal.   Eyes: Conjunctivae and EOM are normal. Pupils are equal, round, and reactive to light. Right eye exhibits no  discharge. Left eye exhibits no discharge.   Neck: No JVD present.   Normal range of motion.  Cardiovascular:  Normal rate, regular rhythm, normal heart sounds, intact distal pulses and normal pulses.           Pulmonary/Chest: Effort normal and breath sounds normal. No respiratory distress.   Abdominal: Abdomen is soft. Bowel sounds are normal. She exhibits no distension. There is abdominal tenderness in the right lower quadrant. No hernia.   No right CVA tenderness.  No left CVA tenderness. There is no rebound, no guarding, no tenderness at McBurney's point and negative Spencer's sign. negative Rovsing's sign  Musculoskeletal:         General: Normal range of motion.      Right shoulder: Normal.      Left shoulder: Normal.      Right elbow: Normal.      Left elbow: Normal.      Right wrist: Normal.      Left wrist: Normal.      Cervical back: Normal and normal range of motion.      Thoracic back: Normal.      Lumbar back: Normal.      Right hip: Normal.      Left hip: Normal.      Right knee: Normal.      Left knee: Normal.      Right lower leg: Normal.      Left lower leg: Normal.     Lymphadenopathy:        Left: No supraclavicular adenopathy present.   Neurological: She is alert and oriented to person, place, and time. She has normal strength. No cranial nerve deficit or sensory deficit. Gait normal.   Skin: Skin is warm and dry. Capillary refill takes less than 2 seconds. No bruising and no ecchymosis noted.   Psychiatric: She has a normal mood and affect. Her speech is normal and behavior is normal. Thought content normal.         ED Course   Procedures  Labs Reviewed - No data to display       Imaging Results    None          Medications   promethazine (PHENERGAN) 12.5 mg in dextrose 5 % (D5W) 50 mL IVPB (0 mg Intravenous Stopped 9/1/23 1305)   ketorolac injection 15 mg (15 mg Intravenous Given 9/1/23 1245)   sodium chloride 0.9% bolus 1,000 mL 1,000 mL (1,000 mLs Intravenous New Bag 9/1/23 1306)     Medical  "Decision Making  23-year-old female with a past medical history of anxiety and mesenteric adenitis presenting to the emergency department today complaining of nausea, vomiting, diarrhea, right lower quadrant pain for several weeks.  Patient was seen here in the emergency department yesterday for the same complaints and discharged home with normal imaging.  Patient has an upcoming GI appointment scheduled on 09/08.  Patient states she is coming in today because she is able to get a few hours asleep before waking up to the pain.  Patient states her pain is not different from previous visits, she just can not sleep anymore from the pain.  Patient denies any fever, chest pain, shortness of breath, headache, dysuria.  Patient's chart and medical history reviewed.  Patient's vitals reviewed.  He is afebrile, no respiratory distress, nontoxic-appearing in the ED.  - appendicitis: unlikely with Muro score of 3   - mesenteric ischemia: unlikely with no Hx of a-fib or hypercoagulability, pain is chronic in presentation.  - cholecystitis: unlikely with cholecystectomy  - dissection/AAA: unlikely with stable v/s, equal pulses bilaterally UE/LE, no abdominal mass/pulsing mass.  Discussed with patient about her having blood labs drawn yesterday and recent CT imaging done earlier in August. Pt states the pain is the same and has not changed for her its just waking her up in the middle of the night and she cannot take it anymore until her appointment with GI on 9/8. She states she does not want any imaging or labs drawn, and states "if you can give me just 3 hours of sleep without pain I would be happy" patient denies any imaging or labs. Patient was given Phenergan for nausea with complete relief. Patient given Toradol for pain and brought pain from 10/10 pain to 4/10 pain and tolerable. With patient pain under control, GI appointment coming up on 9/8, stable v/s and afebrile. Patient will be d/c home with f/u with her up " coming appointment and to contact her PCP. Pt will be sent home with a 6 day script for percocet for pain.  I discussed with the patient/family the diagnosis, treatment plan, indications for return to the emergency department, and for expected follow-up. The patient/family verbalized an understanding. The patient/family is asked if there are any questions or concerns. We discuss the case, until all issues are addressed to the patient/family's satisfaction. Patient/family understands and is agreeable to the plan.   GURWINDER VANEGAS    DISCLAIMER: This note was prepared with [employer] voice recognition transcription software. Garbled syntax, mangled pronouns, and other bizarre constructions may be attributed to that software system.        Muro Score for Acute Appendicitis from [employer]  on 9/1/2023  ** All calculations should be rechecked by clinician prior to use **    RESULT SUMMARY:  3 points  Unlikely appendicitis by the Muro Score.      INPUTS:  Right lower quadrant tenderness -> 2 = Yes  Elevated temperature (37.3°C or 99.1°F) -> 0 = No  Rebound tenderness -> 0 = No  Migration of pain to the right lower quadrant -> 0 = No  Anorexia -> 0 = No  Nausea or vomiting -> 1 = Yes  Leukocytosis >10,000 -> 0 = No  Leukocyte left shift -> 0 = No      Risk  Prescription drug management.                               Clinical Impression:   Final diagnoses:  [I88.0] Mesenteric adenitis (Primary)  [R10.9] Abdominal pain, unspecified abdominal location  [R11.0] Nausea        ED Disposition Condition    Discharge Stable          ED Prescriptions       Medication Sig Dispense Start Date End Date Auth. Provider    oxyCODONE-acetaminophen (PERCOCET) 5-325 mg per tablet Take 1 tablet by mouth every 6 (six) hours as needed for Pain. 6 tablet 9/1/2023 -- Gurwinder aVnegas PA-C          Follow-up Information       Follow up With Specialties Details Why Contact Info    Elbert Oliveira MD Family Medicine Schedule an appointment as  soon as possible for a visit in 1 week  [address] [facility] [address]  [phone]      your GI physician  Call                Gurwinder Barriga PA-C  09/01/23 9362

## 2023-09-01 NOTE — ED TRIAGE NOTES
Pt. Reports she woke up this morning nicole gen body aches and nausea. Pt. Report she took a percocet which she had left over from a prior visit.

## 2023-09-01 NOTE — TELEPHONE ENCOUNTER
Reason for Disposition   General information question, no triage required and triager able to answer question    Protocols used: Information Only Call - No Triage-A-[place]  Pt was just triaged by another Triage nurse and instructed to go to the ED for abdominal pain. Pt called back. Advised caller the Triage advice/disposition cannot change and to follow the instructions she received. Pt asked what does she tell the ED. Advised her to tell them because of her symptoms she was advised to go to the ED. She verbalized understanding.

## 2023-09-08 ENCOUNTER — OFFICE VISIT (OUTPATIENT)
Dept: OBSTETRICS AND GYNECOLOGY | Facility: CLINIC | Age: 23
End: 2023-09-08
Payer: MEDICAID

## 2023-09-08 ENCOUNTER — OFFICE VISIT (OUTPATIENT)
Dept: GASTROENTEROLOGY | Facility: CLINIC | Age: 23
End: 2023-09-08
Payer: MEDICAID

## 2023-09-08 VITALS
HEART RATE: 125 BPM | BODY MASS INDEX: 43.28 KG/M2 | OXYGEN SATURATION: 99 % | WEIGHT: 244.25 LBS | SYSTOLIC BLOOD PRESSURE: 102 MMHG | HEIGHT: 63 IN | DIASTOLIC BLOOD PRESSURE: 80 MMHG

## 2023-09-08 VITALS
WEIGHT: 243.06 LBS | SYSTOLIC BLOOD PRESSURE: 110 MMHG | DIASTOLIC BLOOD PRESSURE: 60 MMHG | BODY MASS INDEX: 43.06 KG/M2

## 2023-09-08 DIAGNOSIS — I88.0 MESENTERIC ADENITIS: Primary | ICD-10-CM

## 2023-09-08 DIAGNOSIS — M54.31 RIGHT SCIATIC NERVE PAIN: ICD-10-CM

## 2023-09-08 DIAGNOSIS — E66.01 CLASS 3 SEVERE OBESITY DUE TO EXCESS CALORIES WITH BODY MASS INDEX (BMI) OF 40.0 TO 44.9 IN ADULT, UNSPECIFIED WHETHER SERIOUS COMORBIDITY PRESENT: ICD-10-CM

## 2023-09-08 DIAGNOSIS — M54.16 LUMBAR RADICULOPATHY, CHRONIC: Primary | ICD-10-CM

## 2023-09-08 DIAGNOSIS — I88.0 MESENTERIC LYMPHADENITIS: ICD-10-CM

## 2023-09-08 PROCEDURE — 3079F DIAST BP 80-89 MM HG: CPT | Mod: CPTII,,, | Performed by: NURSE PRACTITIONER

## 2023-09-08 PROCEDURE — 99214 OFFICE O/P EST MOD 30 MIN: CPT | Mod: PBBFAC,27 | Performed by: OBSTETRICS & GYNECOLOGY

## 2023-09-08 PROCEDURE — 99214 OFFICE O/P EST MOD 30 MIN: CPT | Mod: S$PBB,,, | Performed by: NURSE PRACTITIONER

## 2023-09-08 PROCEDURE — 1160F PR REVIEW ALL MEDS BY PRESCRIBER/CLIN PHARMACIST DOCUMENTED: ICD-10-PCS | Mod: CPTII,,, | Performed by: NURSE PRACTITIONER

## 2023-09-08 PROCEDURE — 99999 PR PBB SHADOW E&M-EST. PATIENT-LVL V: CPT | Mod: PBBFAC,,, | Performed by: NURSE PRACTITIONER

## 2023-09-08 PROCEDURE — 1160F RVW MEDS BY RX/DR IN RCRD: CPT | Mod: CPTII,,, | Performed by: NURSE PRACTITIONER

## 2023-09-08 PROCEDURE — 99999 PR PBB SHADOW E&M-EST. PATIENT-LVL IV: ICD-10-PCS | Mod: PBBFAC,,, | Performed by: OBSTETRICS & GYNECOLOGY

## 2023-09-08 PROCEDURE — 3008F PR BODY MASS INDEX (BMI) DOCUMENTED: ICD-10-PCS | Mod: CPTII,,, | Performed by: OBSTETRICS & GYNECOLOGY

## 2023-09-08 PROCEDURE — 3008F BODY MASS INDEX DOCD: CPT | Mod: CPTII,,, | Performed by: OBSTETRICS & GYNECOLOGY

## 2023-09-08 PROCEDURE — 3044F HG A1C LEVEL LT 7.0%: CPT | Mod: CPTII,,, | Performed by: OBSTETRICS & GYNECOLOGY

## 2023-09-08 PROCEDURE — 3079F PR MOST RECENT DIASTOLIC BLOOD PRESSURE 80-89 MM HG: ICD-10-PCS | Mod: CPTII,,, | Performed by: NURSE PRACTITIONER

## 2023-09-08 PROCEDURE — 3074F SYST BP LT 130 MM HG: CPT | Mod: CPTII,,, | Performed by: OBSTETRICS & GYNECOLOGY

## 2023-09-08 PROCEDURE — 3008F BODY MASS INDEX DOCD: CPT | Mod: CPTII,,, | Performed by: NURSE PRACTITIONER

## 2023-09-08 PROCEDURE — 3008F PR BODY MASS INDEX (BMI) DOCUMENTED: ICD-10-PCS | Mod: CPTII,,, | Performed by: NURSE PRACTITIONER

## 2023-09-08 PROCEDURE — 3078F PR MOST RECENT DIASTOLIC BLOOD PRESSURE < 80 MM HG: ICD-10-PCS | Mod: CPTII,,, | Performed by: OBSTETRICS & GYNECOLOGY

## 2023-09-08 PROCEDURE — 1159F MED LIST DOCD IN RCRD: CPT | Mod: CPTII,,, | Performed by: NURSE PRACTITIONER

## 2023-09-08 PROCEDURE — 3044F PR MOST RECENT HEMOGLOBIN A1C LEVEL <7.0%: ICD-10-PCS | Mod: CPTII,,, | Performed by: NURSE PRACTITIONER

## 2023-09-08 PROCEDURE — 3078F DIAST BP <80 MM HG: CPT | Mod: CPTII,,, | Performed by: OBSTETRICS & GYNECOLOGY

## 2023-09-08 PROCEDURE — 99999 PR PBB SHADOW E&M-EST. PATIENT-LVL V: ICD-10-PCS | Mod: PBBFAC,,, | Performed by: NURSE PRACTITIONER

## 2023-09-08 PROCEDURE — 3044F PR MOST RECENT HEMOGLOBIN A1C LEVEL <7.0%: ICD-10-PCS | Mod: CPTII,,, | Performed by: OBSTETRICS & GYNECOLOGY

## 2023-09-08 PROCEDURE — 3044F HG A1C LEVEL LT 7.0%: CPT | Mod: CPTII,,, | Performed by: NURSE PRACTITIONER

## 2023-09-08 PROCEDURE — 3074F SYST BP LT 130 MM HG: CPT | Mod: CPTII,,, | Performed by: NURSE PRACTITIONER

## 2023-09-08 PROCEDURE — 99214 PR OFFICE/OUTPT VISIT, EST, LEVL IV, 30-39 MIN: ICD-10-PCS | Mod: S$PBB,,, | Performed by: NURSE PRACTITIONER

## 2023-09-08 PROCEDURE — 1159F PR MEDICATION LIST DOCUMENTED IN MEDICAL RECORD: ICD-10-PCS | Mod: CPTII,,, | Performed by: NURSE PRACTITIONER

## 2023-09-08 PROCEDURE — 3074F PR MOST RECENT SYSTOLIC BLOOD PRESSURE < 130 MM HG: ICD-10-PCS | Mod: CPTII,,, | Performed by: NURSE PRACTITIONER

## 2023-09-08 PROCEDURE — 99215 OFFICE O/P EST HI 40 MIN: CPT | Mod: PBBFAC,PO | Performed by: NURSE PRACTITIONER

## 2023-09-08 PROCEDURE — 99213 PR OFFICE/OUTPT VISIT, EST, LEVL III, 20-29 MIN: ICD-10-PCS | Mod: S$PBB,,, | Performed by: OBSTETRICS & GYNECOLOGY

## 2023-09-08 PROCEDURE — 99213 OFFICE O/P EST LOW 20 MIN: CPT | Mod: S$PBB,,, | Performed by: OBSTETRICS & GYNECOLOGY

## 2023-09-08 PROCEDURE — 3074F PR MOST RECENT SYSTOLIC BLOOD PRESSURE < 130 MM HG: ICD-10-PCS | Mod: CPTII,,, | Performed by: OBSTETRICS & GYNECOLOGY

## 2023-09-08 PROCEDURE — 99999 PR PBB SHADOW E&M-EST. PATIENT-LVL IV: CPT | Mod: PBBFAC,,, | Performed by: OBSTETRICS & GYNECOLOGY

## 2023-09-08 RX ORDER — PROMETHAZINE HYDROCHLORIDE 25 MG/1
TABLET ORAL
Qty: 60 TABLET | Refills: 1 | Status: SHIPPED | OUTPATIENT
Start: 2023-09-08

## 2023-09-08 RX ORDER — DICLOFENAC SODIUM 75 MG/1
75 TABLET, DELAYED RELEASE ORAL 2 TIMES DAILY
Qty: 60 TABLET | Refills: 0 | Status: SHIPPED | OUTPATIENT
Start: 2023-09-08 | End: 2023-09-08

## 2023-09-08 RX ORDER — NAPROXEN 500 MG/1
500 TABLET ORAL 2 TIMES DAILY WITH MEALS
Qty: 28 TABLET | Refills: 0 | Status: SHIPPED | OUTPATIENT
Start: 2023-09-08 | End: 2023-09-22

## 2023-09-08 RX ORDER — OXYCODONE AND ACETAMINOPHEN 5; 325 MG/1; MG/1
1 TABLET ORAL EVERY 6 HOURS PRN
Qty: 20 TABLET | Refills: 0 | Status: SHIPPED | OUTPATIENT
Start: 2023-09-08 | End: 2023-09-14 | Stop reason: SDUPTHER

## 2023-09-08 NOTE — PROGRESS NOTES
Subjective:       Patient ID: Davida Parsons is a 23 y.o. female.    Chief Complaint: Abdominal Pain (Lower)    22 y/o female with pmhx of ovarian cyst, cholecystectomy, anxiety and depression presents clinic for hospital follow up. She has been to the ED on multiple occasions within the last month for abdominal pain,n/v,and diarrhea. CT abd significant for mesenteric adenitis. Today she reports lower abdominal pain. Pain radiates to right hip and pelvis. No n/v or diarrhea in 2 weeks. She is taking dicyclomine prn with minimal symptoms relief. Denies heartburn, reflux, early satiety, dysphagia, unintentional weight loss, fever, or chills. No FH stomach or colon cancer, celiac, or IBD.        Past Medical History:   Diagnosis Date    Digestive disorder     gerd    Mental disorder     anxiety and depression    Ovarian cyst     Thyroid disease     Trauma     patient states she has both desceased parents and suffers from depression and anxiety       Past Surgical History:   Procedure Laterality Date    ESOPHAGOGASTRODUODENOSCOPY N/A 7/13/2023    Procedure: EGD (ESOPHAGOGASTRODUODENOSCOPY);  Surgeon: Juliane Bowers MD;  Location: [facility];  Service: Endoscopy;  Laterality: N/A;    LAPAROSCOPIC CHOLECYSTECTOMY N/A 7/4/2021    Procedure: CHOLECYSTECTOMY, LAPAROSCOPIC;  Surgeon: Mauricio An MD;  Location: [address];  Service: General;  Laterality: N/A;    TONSILLECTOMY         Family History   Problem Relation Age of Onset    Diabetes Father     Cancer Father         skin    Diabetes Maternal Grandmother     Hypertension Maternal Grandmother     Diabetes Paternal Grandfather     Amblyopia Neg Hx     Blindness Neg Hx     Cataracts Neg Hx     Glaucoma Neg Hx     Macular degeneration Neg Hx     Retinal detachment Neg Hx        Social History     Socioeconomic History    Marital status: Single   Tobacco Use    Smoking status: Every Day     Current packs/day: 0.50     Types: Cigarettes    Smokeless tobacco:  Current   Substance and Sexual Activity    Alcohol use: Yes     Comment: occasionally    Drug use: Yes     Types: Marijuana    Sexual activity: Yes     Birth control/protection: Condom, Implant     Social Determinants of Health     Financial Resource Strain: Low Risk  (3/30/2023)    Overall Financial Resource Strain (CARDIA)     Difficulty of Paying Living Expenses: Not hard at all   Food Insecurity: No Food Insecurity (3/30/2023)    Hunger Vital Sign     Worried About Running Out of Food in the Last Year: Never true     Ran Out of Food in the Last Year: Never true   Transportation Needs: No Transportation Needs (3/30/2023)    PRAPARE - Transportation     Lack of Transportation (Medical): No     Lack of Transportation (Non-Medical): No   Stress: Stress Concern Present (3/30/2023)    [employer] - Occupational Stress Questionnaire     Feeling of Stress : To some extent   Social Connections: Unknown (3/30/2023)    Social Connection and Isolation Panel [NHANES]     Frequency of Communication with Friends and Family: Three times a week     Frequency of Social Gatherings with Friends and Family: Once a week     Marital Status: Never    Housing Stability: Unknown (3/30/2023)    Housing Stability Vital Sign     Unable to Pay for Housing in the Last Year: No     Unstable Housing in the Last Year: No       Review of Systems   Constitutional:  Negative for unexpected weight change.   HENT:  Negative for trouble swallowing.    Respiratory:  Negative for shortness of breath.    Cardiovascular:  Negative for chest pain.   Gastrointestinal:  Positive for abdominal pain. Negative for blood in stool, constipation and diarrhea.   Psychiatric/Behavioral:  Negative for dysphoric mood.          Objective:     Vitals:    09/08/23 1539   BP: 102/80   BP Location: Right arm   Patient Position: Sitting   BP Method: Large (Manual)   Pulse: (!) 125   SpO2: 99%   Weight: 110.8 kg (244 lb 4.3 oz)   Height:  "5' 3" (1.6 m)          Physical Exam  Constitutional:       General: She is not in acute distress.     Appearance: She is obese.   HENT:      Head: Normocephalic.   Eyes:      Conjunctiva/sclera: Conjunctivae normal.   Pulmonary:      Effort: Pulmonary effort is normal. No respiratory distress.   Abdominal:      General: Abdomen is protuberant.   Musculoskeletal:         General: Normal range of motion.      Cervical back: Normal range of motion.   Skin:     General: Skin is warm and dry.   Neurological:      Mental Status: She is alert and oriented to person, place, and time.   Psychiatric:         Mood and Affect: Mood normal.         Behavior: Behavior normal.               Assessment:         ICD-10-CM ICD-9-CM   1. Mesenteric adenitis  I88.0 289.2   2. Mesenteric lymphadenitis  I88.0 289.2   3. Class 3 severe obesity due to excess calories with body mass index (BMI) of 40.0 to 44.9 in adult, unspecified whether serious comorbidity present  E66.01 278.01    Z68.41 V85.41       Plan:       Mesenteric adenitis; Mesenteric lymphadenitis  -     naproxen (NAPROSYN) 500 MG tablet; Take 1 tablet (500 mg total) by mouth 2 (two) times daily with meals. for 14 days  Dispense: 28 tablet; Refill: 0    Class 3 severe obesity due to excess calories with body mass index (BMI) of 40.0 to 44.9 in adult, unspecified whether serious comorbidity present        -     Weight loss advised. Dietary and exercise counseling         done.       Follow up in about 1 month (around 10/8/2023) for If symptoms worsen or fail to improve, medication management.     Patient's Medications   New Prescriptions    NAPROXEN (NAPROSYN) 500 MG TABLET    Take 1 tablet (500 mg total) by mouth 2 (two) times daily with meals. for 14 days   Previous Medications    ALBUTEROL (PROVENTIL/VENTOLIN HFA) 90 MCG/ACTUATION INHALER    INHALE 1-2 PUFFS BY MOUTH EVERY 6 HOURS AS NEEDED FOR WHEEZING OR SHORTNESS OF BREATH    CYCLOBENZAPRINE (FLEXERIL) 10 MG TABLET    " Take 10 mg by mouth every evening.    DICYCLOMINE (BENTYL) 20 MG TABLET    Take 1 tablet (20 mg total) by mouth 3 (three) times daily as needed (abdominal pain).    DICYCLOMINE (BENTYL) 20 MG TABLET    Take 1 tablet (20 mg total) by mouth 2 (two) times daily.    ESTRADIOL (ESTRACE) 2 MG TABLET    TAKE 1 TABLET BY MOUTH ONCE DAILY.    LEVOTHYROXINE (TIROSINT) 50 MCG CAP    TAKE 50 MCG BY MOUTH BEFORE BREAKFAST.    METHYLPREDNISOLONE (MEDROL DOSEPACK) 4 MG TABLET    Take as directed on package    OMEPRAZOLE (PRILOSEC) 40 MG CAPSULE    Take 1 capsule (40 mg total) by mouth once daily.    ONDANSETRON (ZOFRAN) 8 MG TABLET    TAKE 1 TABLET BY MOUTH EVERY DAY    ONDANSETRON (ZOFRAN-ODT) 4 MG TBDL    Take 1 tablet (4 mg total) by mouth every 8 (eight) hours as needed (nausea).    ONDANSETRON (ZOFRAN-ODT) 4 MG TBDL    Take 1 tablet (4 mg total) by mouth every 8 (eight) hours as needed (nausea).    OXYCODONE-ACETAMINOPHEN (PERCOCET) 5-325 MG PER TABLET    Take 1 tablet by mouth every 6 (six) hours as needed for Pain.    POLYETHYLENE GLYCOL (GLYCOLAX) 17 GRAM PWPK    Take 17 g by mouth once daily.    PROMETHAZINE (PHENERGAN) 25 MG TABLET    Take 1/2-1 pill po q 8 hr prn nausea/emesis    TRAMADOL (ULTRAM) 50 MG TABLET    Take 50 mg by mouth daily as needed.   Modified Medications    No medications on file   Discontinued Medications    DICLOFENAC (VOLTAREN) 75 MG EC TABLET    Take 1 tablet (75 mg total) by mouth 2 (two) times daily.

## 2023-09-13 ENCOUNTER — PATIENT MESSAGE (OUTPATIENT)
Dept: OBSTETRICS AND GYNECOLOGY | Facility: CLINIC | Age: 23
End: 2023-09-13
Payer: MEDICAID

## 2023-09-14 ENCOUNTER — TELEPHONE (OUTPATIENT)
Dept: OBSTETRICS AND GYNECOLOGY | Facility: HOSPITAL | Age: 23
End: 2023-09-14
Payer: MEDICAID

## 2023-09-14 DIAGNOSIS — M54.31 RIGHT SCIATIC NERVE PAIN: ICD-10-CM

## 2023-09-14 DIAGNOSIS — M54.16 LUMBAR RADICULOPATHY, CHRONIC: ICD-10-CM

## 2023-09-14 RX ORDER — OXYCODONE AND ACETAMINOPHEN 5; 325 MG/1; MG/1
1 TABLET ORAL EVERY 6 HOURS PRN
Qty: 20 TABLET | Refills: 0 | Status: SHIPPED | OUTPATIENT
Start: 2023-09-14 | End: 2023-09-16 | Stop reason: SDUPTHER

## 2023-09-16 ENCOUNTER — TELEPHONE (OUTPATIENT)
Dept: OBSTETRICS AND GYNECOLOGY | Facility: HOSPITAL | Age: 23
End: 2023-09-16
Payer: MEDICAID

## 2023-09-16 DIAGNOSIS — M54.16 LUMBAR RADICULOPATHY, CHRONIC: ICD-10-CM

## 2023-09-16 DIAGNOSIS — M54.31 RIGHT SCIATIC NERVE PAIN: ICD-10-CM

## 2023-09-16 RX ORDER — OXYCODONE AND ACETAMINOPHEN 5; 325 MG/1; MG/1
1 TABLET ORAL EVERY 6 HOURS PRN
Qty: 20 TABLET | Refills: 0 | Status: SHIPPED | OUTPATIENT
Start: 2023-09-16

## 2023-09-18 ENCOUNTER — TELEPHONE (OUTPATIENT)
Dept: ORTHOPEDICS | Facility: CLINIC | Age: 23
End: 2023-09-18
Payer: MEDICAID

## 2023-09-18 NOTE — PROGRESS NOTES
Cc:  ER f/u    HPI:  23 yro G0 who has nexplanon for contraception presents after being seen in ER on 9/1/23.  Pt told to f/u with both GI and Gyn.  Final diagnoses fro ER note:  [I88.0] Mesenteric adenitis (Primary); [R10.9] Abdominal pain, unspecified abdominal location; [R11.0] Nausea    Pt having nausea, vomiting and diarrhea.  Pt also having severe abd pain in RLQ as well as R low back pain with pain radiating down R leg.  Pt states this can feel like an electric shock.  Pt states pain is severe.    CT on 8/25/23:  The visualized lung bases appear clear.  There is mild fluid and air within the gastric lumen, the stomach is incompletely distended, nonspecific appearance.  The gallbladder is surgically absent.  Mild diminished attenuation of the liver may relate to mild diffuse fatty infiltrate.  There is no evidence for acute process of the liver, pancreas, spleen or adrenal glands.  Accessory splenic tissue noted.     There is no evidence for hydronephrosis, ureteral calculus, obstructive uropathy or perinephric inflammatory change bilaterally.  The abdominal aorta appears normal in caliber, demonstrates appropriate opacification.  The urinary bladder appears unremarkable for degree of distention.     There is a 1.8 cm follicle of the right ovary, there is no additional evidence for pelvic mass or cystic collection.     There are small central abdominal mesenteric lymph nodes and mildly more prominent right lower quadrant lymph nodes appearing similar to the prior study, this is nonspecific, may relate to a baseline appearance, may be reactive, can be seen with mesenteric panniculitis or a lymphoproliferative process, clinical and historical correlation is needed.     There is no evidence for small bowel obstructive process.  The appendix is identified, it does not appear inflamed.  There is no evidence for inflammatory or obstructive process of the colon.  There is mild prominence of the rectum with stool  without inflammatory change.  There is no evidence for free intraperitoneal air.     The visualized osseous structures appear intact.     Impression:     There are prominent right lower quadrant and small mesenteric lymph nodes again noted appearing stable, nonspecific, may relate to a baseline appearance, may be reactive, can be seen with mesenteric panniculitis or a lymphoproliferative process.     Mild prominence of the rectum with stool without inflammatory or obstructive pattern.     There is no additional evidence for acute inflammatory or obstructive process of the abdomen or pelvis.     Vitals:    09/08/23 1042   BP: 110/60   Weight: 110.2 kg (243 lb 0.9 oz)     Physical Exam:   Constitutional: She is oriented to person, place, and time. She appears well-developed and well-nourished. She appears distressed.    HENT:   Head: Normocephalic and atraumatic.     Neck: No thyromegaly present.     Pulmonary/Chest: Effort normal. No respiratory distress.        Abdominal: Soft. She exhibits no distension and no mass. There is abdominal tenderness (diffuse low abd and R gluteal and back). There is no rebound and no guarding.                 Neurological: She is alert and oriented to person, place, and time. No cranial nerve deficit. Coordination normal.    Skin: She is not diaphoretic.    Psychiatric: She has a normal mood and affect. Her behavior is normal. Judgment and thought content normal.     Assessment/Plan  1. Lumbar radiculopathy, chronic  CT Lumbar Spine With Contrast    Ambulatory referral/consult to Orthopedics    promethazine (PHENERGAN) 25 MG tablet    DISCONTINUED: diclofenac (VOLTAREN) 75 MG EC tablet    DISCONTINUED: oxyCODONE-acetaminophen (PERCOCET) 5-325 mg per tablet      2. Right sciatic nerve pain  CT Lumbar Spine With Contrast    Ambulatory referral/consult to Orthopedics    promethazine (PHENERGAN) 25 MG tablet    DISCONTINUED: diclofenac (VOLTAREN) 75 MG EC tablet    DISCONTINUED:  oxyCODONE-acetaminophen (PERCOCET) 5-325 mg per tablet

## 2023-09-18 NOTE — TELEPHONE ENCOUNTER
RETURNED THE PT'S CALL AND GOT HER SCHEDULED IN [place] AND ADDED HER TO THE WAITING LIST FOR A SOONER APPT. PT V/U

## 2023-09-18 NOTE — TELEPHONE ENCOUNTER
----- Message from Pepito Hernandez sent at 9/18/2023  9:54 AM CDT -----  Regarding: appt soon  Contact: PT @ [phone]  PT is calling again asking to speak with someone at any location to schedule an appt. Pt states that it has been 2 weeks since she has been out of bed; due to severe hip pain she is experiencing. Pt states that she has been trying to get scheduled for an appt.     Pt is experiencing sciatic nerve pain and is needing help with scheduling an appt. Pt is is asking for a return call back today. Pt states that she is willing to go to any location. Please call. Thanks.                            We spoke 9/18/23  it sounds like sciatica then  sound like hip pain , I am not sure --  she is having a CT Scan on 9/20....  I did not have any available appointment slots that accept her insurance ,  I gave her the medicaid escalation line & wished her good luck

## 2023-09-18 NOTE — ED PROVIDER NOTES
"Encounter Date: 3/26/2020    SCRIBE #1 NOTE: I, Glenn Jerez, am scribing for, and in the presence of,  Ez Aceves PA-C. I have scribed the following portions of the note - Other sections scribed: HPI, ROS, PE.       History     Chief Complaint   Patient presents with    Dizziness     Pt reports lightheadedness, nausea "I took zofran, prozac and 2 ibuprofens around 7:00."     CC: Dizziness    HPI: This is an 19 y.o. F who has a PMHx of Anxiety and Depression who presents to the ED complaining of gradual onset of dizziness that began after taking a shower 2 hours PTA. Pt describes the dizziness as "feeling like going to pass out" sensation. Dizziness is worse with laying down. Pt states that she took one Prozac 10mg, one Zofran 4mg, and two Ibuprofen 200mg today for symptoms without improvement. She states that she last used ectasy 3/12/2020. This is her 3rd visit to the ED over the past 2 week for similar symptoms. She denies using ectasy today, or any other recreational drug use today. Pt denies CP, SOB, abdominal pain. No calf pain/swelling. No personal or family Hx of DVT/PE. No hormone use or long distance travel. Denies snycope and trauma.     The history is provided by the patient. No  was used.     Review of patient's allergies indicates:   Allergen Reactions    Sulfa (sulfonamide antibiotics)      Past Medical History:   Diagnosis Date    Mental disorder     anxiety and depression    Ovarian cyst     Trauma     patient states she has both desceased parents and suffers from depression and anxiety     Past Surgical History:   Procedure Laterality Date    TONSILLECTOMY       Family History   Problem Relation Age of Onset    Cancer Father         skin    Hypertension Maternal Grandmother     Diabetes Paternal Grandfather     Amblyopia Neg Hx     Blindness Neg Hx     Cataracts Neg Hx     Glaucoma Neg Hx     Macular degeneration Neg Hx     Retinal detachment Neg Hx  " Urged patient to keep MFM appointments       Social History     Tobacco Use    Smoking status: Current Every Day Smoker     Packs/day: 0.50     Types: Cigarettes    Smokeless tobacco: Never Used   Substance Use Topics    Alcohol use: Yes     Alcohol/week: 0.0 standard drinks     Comment: occationally    Drug use: Yes     Types: Marijuana     Comment: Ectasy     Review of Systems   Constitutional: Negative for chills and fever.   HENT: Negative for sore throat.    Respiratory: Negative for cough and shortness of breath.    Cardiovascular: Negative for chest pain.   Gastrointestinal: Negative for abdominal pain, diarrhea, nausea and vomiting.   Genitourinary: Negative for dysuria.   Musculoskeletal: Negative for back pain.   Skin: Negative for rash.   Neurological: Positive for dizziness. Negative for weakness.   Hematological: Does not bruise/bleed easily.       Physical Exam     Initial Vitals [03/26/20 2026]   BP Pulse Resp Temp SpO2   (!) 143/99 (!) 122 18 98.2 °F (36.8 °C) 98 %      MAP       --         Physical Exam    Nursing note and vitals reviewed.  Constitutional: She appears well-developed and well-nourished. She is not diaphoretic. No distress.   HENT:   Head: Atraumatic.   Nose: Nose normal.   Mouth/Throat: Oropharynx is clear and moist.   Eyes: Conjunctivae and EOM are normal. Pupils are equal, round, and reactive to light. Right eye exhibits no discharge. Left eye exhibits no discharge.   Neck: Normal range of motion. No JVD present.   Cardiovascular: Regular rhythm. Tachycardia present.  Exam reveals no friction rub.    No murmur heard.  Pulmonary/Chest: Breath sounds normal. No stridor. No respiratory distress. She has no wheezes. She has no rhonchi. She has no rales.   Abdominal: Soft. Bowel sounds are normal. She exhibits no distension. There is no tenderness. There is no guarding.   Musculoskeletal: Normal range of motion.   No lower extremity swelling or tenderness   Neurological: She is alert.   Skin: Skin is warm and dry.    Psychiatric: Her mood appears anxious. Her speech is rapid and/or pressured. She expresses no homicidal and no suicidal ideation.         ED Course   Procedures  Labs Reviewed   POCT URINE PREGNANCY   POCT GLUCOSE   POCT GLUCOSE MONITORING CONTINUOUS     EKG Readings: (Independently Interpreted)   Initial Reading: No STEMI. Rhythm: Sinus Tachycardia. Heart Rate: 121. Axis: Normal.       Imaging Results          CT Head Without Contrast (Final result)  Result time 03/26/20 21:31:56    Final result by Santiago Stewart MD (03/26/20 21:31:56)                 Impression:      No acute intracranial abnormalities identified.      Electronically signed by: Santiago Stewart MD  Date:    03/26/2020  Time:    21:31             Narrative:    EXAMINATION:  CT HEAD WITHOUT CONTRAST    CLINICAL HISTORY:  dizzy;    TECHNIQUE:  Low dose axial images were obtained through the head.  Coronal and sagittal reformations were also performed. Contrast was not administered.    COMPARISON:  None.    FINDINGS:  The brain is normally formed and exhibits normal density throughout with no indication of acute/recent major vascular distribution cerebral infarction, intraparenchymal hemorrhage, or intra-axial space occupying lesion. The ventricular system is normal in size.  Cavum septum pellucidum is incidentally noted.  No effacement of the skull-base cisterns. No abnormal extra-axial fluid collections or blood products. Visualized paranasal sinuses and mastoid air cells are clear. The calvarium shows no significant abnormality.                                 Medical Decision Making:   History:   Old Medical Records: I decided to obtain old medical records.  Independently Interpreted Test(s):   I have ordered and independently interpreted EKG Reading(s) - see prior notes  Clinical Tests:   Lab Tests: Ordered and Reviewed  Radiological Study: Ordered and Reviewed  Medical Tests: Ordered and Reviewed  ED Management:  Will obtains screening labs  and imaging while treating symptoms and monitoring patient.     Patient reports significant improvement of symptoms with Valium and Phenergan.  She remains well-appearing and is now calm.  Vitals normalized and stable.    I am unsure of the etiology of this patient's symptoms, however, I do not believe they are of emergent etiology at this time. May be related to anxiety or recent MDMA drug use, however, this is not clear and needs further investigation with PCP and neurology as an outpatient. She has a Hx of anxiety. Given 3rd presentation to ED for similar symptoms, CT of head is obtained that shows no acute hemorrhage or mass.  EKG normal.  Glucose normal.  Incidentally found to be orthostatic but reports being able to tolerate p.o. without complication; this may be also contributing to her symptoms.  Recent labs obtained for similar symptoms are all unremarkable.  Specifically no evidence for severe anemia, thyrotoxicosis, or elevated D dimer suggestive of PE.  I doubt ACS.    Advising follow-up with PCP and Neurology.  Patient reports having a appointment with PCP over the next few days.  I placed an ambulatory referral for Neurology.  Strict return precautions discussed with patient who is agreeable to the plan.  Patient is noted to be very well-appearing, calm, and drinking Polar Pop fountain drink on her way out.                                 Clinical Impression:       ICD-10-CM ICD-9-CM   1. Dizziness R42 780.4   2. Nausea R11.0 787.02   3. Orthostatic dizziness R42 780.4   4. History of anxiety Z86.59 V11.8             ED Disposition Condition    Discharge Stable        ED Prescriptions     Medication Sig Dispense Start Date End Date Auth. Provider    promethazine (PHENERGAN) 25 MG tablet Take 1 tablet (25 mg total) by mouth every 6 (six) hours as needed for Nausea. 15 tablet 3/26/2020  Ez Aceves PA-C        Follow-up Information     Follow up With Specialties Details Why Contact Info    Elbert VELÁSQUEZ  MD [facility] Schedule an appointment as soon as possible for a visit in 1 day For re-evaluation [address] [facility] [address]  [phone]      [facility] Surgical Oncology, Orthopedic Surgery, Genetics, Physical Medicine and Rehabilitation, Occupational Therapy, Radiology Go in 1 day For further evaluation, For more definitive management of your symptoms [address] [facility] [address]  [phone]      [facility] Emergency Medicine Go to  If symptoms worsen [address] [place] [address] [phone]                        I, Ez Aceves PA-C, personally performed the services described in this documentation.  All medical record entries made by the scribe were at my direction and in my presence.  I have reviewed the chart and agree that the record reflects my personal performance and is accurate and complete.              Ez Aceves PA-C  03/26/20 2165

## 2023-09-18 NOTE — TELEPHONE ENCOUNTER
----- Message from Pepito Hernandez sent at 9/18/2023  9:54 AM CDT -----  Regarding: appt soon  Contact: PT @ [phone]  PT is calling again asking to speak with someone at any location to schedule an appt. Pt states that it has been 2 weeks since she has been out of bed; due to severe hip pain she is experiencing. Pt states that she has been trying to get scheduled for an appt.     Pt is experiencing sciatic nerve pain and is needing help with scheduling an appt. Pt is is asking for a return call back today. Pt states that she is willing to go to any location. Please call. Thanks.

## 2023-09-20 ENCOUNTER — HOSPITAL ENCOUNTER (OUTPATIENT)
Dept: RADIOLOGY | Facility: HOSPITAL | Age: 23
Discharge: HOME | End: 2023-09-20
Attending: OBSTETRICS & GYNECOLOGY
Payer: MEDICAID

## 2023-09-20 DIAGNOSIS — M54.16 LUMBAR RADICULOPATHY, CHRONIC: ICD-10-CM

## 2023-09-20 DIAGNOSIS — M54.31 RIGHT SCIATIC NERVE PAIN: ICD-10-CM

## 2023-09-20 PROCEDURE — 72132 CT LUMBAR SPINE W/DYE: CPT | Mod: 26,,, | Performed by: RADIOLOGY

## 2023-09-20 PROCEDURE — 72132 CT LUMBAR SPINE W/DYE: CPT | Mod: TC

## 2023-09-20 PROCEDURE — 25500020 PHARM REV CODE 255: Performed by: OBSTETRICS & GYNECOLOGY

## 2023-09-20 PROCEDURE — 72132 CT LUMBAR SPINE WITH CONTRAST: ICD-10-PCS | Mod: 26,,, | Performed by: RADIOLOGY

## 2023-09-20 RX ADMIN — IOHEXOL 85 ML: 350 INJECTION, SOLUTION INTRAVENOUS at 02:09

## 2023-09-26 ENCOUNTER — PATIENT MESSAGE (OUTPATIENT)
Dept: OBSTETRICS AND GYNECOLOGY | Facility: CLINIC | Age: 23
End: 2023-09-26
Payer: MEDICAID

## 2023-10-03 ENCOUNTER — OFFICE VISIT (OUTPATIENT)
Dept: OBSTETRICS AND GYNECOLOGY | Facility: CLINIC | Age: 23
End: 2023-10-03
Payer: MEDICAID

## 2023-10-03 DIAGNOSIS — R10.2 PELVIC PAIN: Primary | ICD-10-CM

## 2023-10-03 DIAGNOSIS — R30.9 PAINFUL URINATION: ICD-10-CM

## 2023-10-03 PROCEDURE — 99499 UNLISTED E&M SERVICE: CPT | Mod: S$PBB,,, | Performed by: OBSTETRICS & GYNECOLOGY

## 2023-10-03 PROCEDURE — 99999 PR PBB SHADOW E&M-EST. PATIENT-LVL I: ICD-10-PCS | Mod: PBBFAC,,, | Performed by: OBSTETRICS & GYNECOLOGY

## 2023-10-03 PROCEDURE — 99999 PR PBB SHADOW E&M-EST. PATIENT-LVL I: CPT | Mod: PBBFAC,,, | Performed by: OBSTETRICS & GYNECOLOGY

## 2023-10-03 PROCEDURE — 99499 NO LOS: ICD-10-PCS | Mod: S$PBB,,, | Performed by: OBSTETRICS & GYNECOLOGY

## 2023-10-03 PROCEDURE — 99211 OFF/OP EST MAY X REQ PHY/QHP: CPT | Mod: PBBFAC | Performed by: OBSTETRICS & GYNECOLOGY

## 2023-11-17 ENCOUNTER — OFFICE VISIT (OUTPATIENT)
Dept: ORTHOPEDICS | Facility: CLINIC | Age: 23
End: 2023-11-17
Payer: MEDICAID

## 2023-11-17 ENCOUNTER — HOSPITAL ENCOUNTER (OUTPATIENT)
Dept: RADIOLOGY | Facility: HOSPITAL | Age: 23
Discharge: HOME | End: 2023-11-17
Attending: ORTHOPAEDIC SURGERY
Payer: MEDICAID

## 2023-11-17 VITALS — WEIGHT: 244.25 LBS | HEIGHT: 63 IN | BODY MASS INDEX: 43.28 KG/M2

## 2023-11-17 DIAGNOSIS — M25.551 PAIN OF RIGHT HIP: ICD-10-CM

## 2023-11-17 DIAGNOSIS — M25.551 PAIN OF RIGHT HIP: Primary | ICD-10-CM

## 2023-11-17 DIAGNOSIS — M54.16 LUMBAR RADICULOPATHY, CHRONIC: ICD-10-CM

## 2023-11-17 DIAGNOSIS — M54.31 RIGHT SCIATIC NERVE PAIN: ICD-10-CM

## 2023-11-17 PROCEDURE — 99204 OFFICE O/P NEW MOD 45 MIN: CPT | Mod: S$PBB,,, | Performed by: ORTHOPAEDIC SURGERY

## 2023-11-17 PROCEDURE — 99999 PR PBB SHADOW E&M-EST. PATIENT-LVL IV: ICD-10-PCS | Mod: PBBFAC,,, | Performed by: ORTHOPAEDIC SURGERY

## 2023-11-17 PROCEDURE — 3008F PR BODY MASS INDEX (BMI) DOCUMENTED: ICD-10-PCS | Mod: CPTII,,, | Performed by: ORTHOPAEDIC SURGERY

## 2023-11-17 PROCEDURE — 3044F HG A1C LEVEL LT 7.0%: CPT | Mod: CPTII,,, | Performed by: ORTHOPAEDIC SURGERY

## 2023-11-17 PROCEDURE — 3008F BODY MASS INDEX DOCD: CPT | Mod: CPTII,,, | Performed by: ORTHOPAEDIC SURGERY

## 2023-11-17 PROCEDURE — 73502 X-RAY EXAM HIP UNI 2-3 VIEWS: CPT | Mod: 26,RT,, | Performed by: RADIOLOGY

## 2023-11-17 PROCEDURE — 99999 PR PBB SHADOW E&M-EST. PATIENT-LVL IV: CPT | Mod: PBBFAC,,, | Performed by: ORTHOPAEDIC SURGERY

## 2023-11-17 PROCEDURE — 1159F PR MEDICATION LIST DOCUMENTED IN MEDICAL RECORD: ICD-10-PCS | Mod: CPTII,,, | Performed by: ORTHOPAEDIC SURGERY

## 2023-11-17 PROCEDURE — 73502 XR HIP WITH PELVIS WHEN PERFORMED, 2 OR 3  VIEWS RIGHT: ICD-10-PCS | Mod: 26,RT,, | Performed by: RADIOLOGY

## 2023-11-17 PROCEDURE — 99214 OFFICE O/P EST MOD 30 MIN: CPT | Mod: PBBFAC,PO | Performed by: ORTHOPAEDIC SURGERY

## 2023-11-17 PROCEDURE — 73502 X-RAY EXAM HIP UNI 2-3 VIEWS: CPT | Mod: TC,PO,RT

## 2023-11-17 PROCEDURE — 99204 PR OFFICE/OUTPT VISIT, NEW, LEVL IV, 45-59 MIN: ICD-10-PCS | Mod: S$PBB,,, | Performed by: ORTHOPAEDIC SURGERY

## 2023-11-17 PROCEDURE — 3044F PR MOST RECENT HEMOGLOBIN A1C LEVEL <7.0%: ICD-10-PCS | Mod: CPTII,,, | Performed by: ORTHOPAEDIC SURGERY

## 2023-11-17 PROCEDURE — 1159F MED LIST DOCD IN RCRD: CPT | Mod: CPTII,,, | Performed by: ORTHOPAEDIC SURGERY

## 2023-11-17 RX ORDER — DICLOFENAC SODIUM 50 MG/1
50 TABLET, DELAYED RELEASE ORAL 2 TIMES DAILY
Qty: 60 TABLET | Refills: 0 | Status: SHIPPED | OUTPATIENT
Start: 2023-11-17

## 2023-11-17 NOTE — PROGRESS NOTES
Subjective:      Patient ID: Davida Parsons is a 23 y.o. female.    Chief Complaint: No chief complaint on file.    HPI  23-year-old female with a 3 to four-month history of right-sided lower back pain.  She states that occasionally radiates into her hip down the back of her right leg.  There has been no trauma.  She states she is been seen in the emergency department as well as an OB gyn physician.  She was given a few pain pills which helped to some degree.  She denies any bowel or bladder symptoms numbness or tingling.  ROS      Objective:    Ortho Exam     Constitutional:   Patient is alert  and oriented in no acute distress  HEENT:  normocephalic atraumatic; PERRL EOMI  Neck:  Supple without adenopathy  Cardiovascular:  Normal rate and rhythm  Pulmonary:  Normal respiratory effort normal chest wall expansion  Abdominal:  Nonprotuberant nondistended  Musculoskeletal:   patient has no abnormality to inspection or palpation.  There is no scoliosis noted. Minimal tenderness over the low back.  Normal nonantalgic gait. Full and nonpainful bilateral hip range of motion. Minimal limitation of forward flexion. No Josh signs noted.  Motor strength is 5/5 all muscle groups tested.  Deep tendon reflexes were 2+ and symmetric. Toes downgoing with Babinski testing with no clonus  Neurological:  No focal defect; cranial nerves 2-12 grossly intact  Psychiatric/behavioral:  Mood and behavior normal    CT Lumbar Spine With Contrast  Narrative: EXAMINATION:  CT LUMBAR SPINE WITH CONTRAST    CLINICAL HISTORY:  Lumbar radiculopathy, symptoms persist with conservative treatment;  Radiculopathy, lumbar region    TECHNIQUE:  Low-dose axial, sagittal and coronal reformations are obtained through the lumbar spine.  Contrast was not administered.    COMPARISON:  None.    FINDINGS:  Alignment: Normal.    Vertebrae: No fracture. No lytic or blastic lesion.    Discs: Normal height.    Sacroiliac joints: Normal.    Degenerative  findings:    T12-L1: No spinal canal stenosis or neural foraminal narrowing.    L1-L2: No spinal canal stenosis or neural foraminal narrowing.    L2-L3: No spinal canal stenosis or neural foraminal narrowing.    L3-L4: No spinal canal stenosis or neural foraminal narrowing.    L4-L5: No spinal canal stenosis or neural foraminal narrowing.    L5-S1: No spinal canal stenosis or neural foraminal narrowing.    Paraspinal muscles & soft tissues: Unremarkable.  Impression: Unremarkable examination.    Electronically signed by: Shawn Gage MD  Date:    09/20/2023  Time:    15:29       My Radiographs Findings:    I have personally reviewed radiographs and concur with above findings    Assessment:       Encounter Diagnoses   Name Primary?    Lumbar radiculopathy, chronic     Right sciatic nerve pain     Pain of right hip Yes         Plan:       I have discussed medical condition treatment options her at length.  I have suggested some physical therapy.  I will give her a trial of physical therapy.  I have discussed generalized activity restrictions ice alternating with heat other modalities as needed.  If symptoms fail to improve she would need to seek care from either her family physician and pain management or a back specialist.  Follow up can be as needed.        Past Medical History:   Diagnosis Date    Digestive disorder     gerd    Mental disorder     anxiety and depression    Ovarian cyst     Thyroid disease     Trauma     patient states she has both desceased parents and suffers from depression and anxiety     Past Surgical History:   Procedure Laterality Date    ESOPHAGOGASTRODUODENOSCOPY N/A 7/13/2023    Procedure: EGD (ESOPHAGOGASTRODUODENOSCOPY);  Surgeon: Juliane Bowers MD;  Location: [facility];  Service: Endoscopy;  Laterality: N/A;    LAPAROSCOPIC CHOLECYSTECTOMY N/A 7/4/2021    Procedure: CHOLECYSTECTOMY, LAPAROSCOPIC;  Surgeon: Mauricio An MD;  Location: [facility] OR [address] [place] [address];  Service: General;   Laterality: N/A;    TONSILLECTOMY           Current Outpatient Medications:     albuterol (PROVENTIL/VENTOLIN HFA) 90 mcg/actuation inhaler, INHALE 1-2 PUFFS BY MOUTH EVERY 6 HOURS AS NEEDED FOR WHEEZING OR SHORTNESS OF BREATH, Disp: 6.7 g, Rfl: 1    cyclobenzaprine (FLEXERIL) 10 MG tablet, Take 10 mg by mouth every evening., Disp: , Rfl:     diazePAM (VALIUM) 5 MG tablet, Take 1 tablet (5 mg total) by mouth every 24 hours as needed for Anxiety or Insomnia., Disp: 30 tablet, Rfl: 0    dicyclomine (BENTYL) 20 mg tablet, Take 1 tablet (20 mg total) by mouth 3 (three) times daily as needed (abdominal pain)., Disp: 60 tablet, Rfl: 2    estradioL (ESTRACE) 2 MG tablet, TAKE 1 TABLET BY MOUTH ONCE DAILY., Disp: 21 tablet, Rfl: 0    levothyroxine (TIROSINT) 50 mcg Cap, Take 50 mcg by mouth before breakfast., Disp: 30 capsule, Rfl: 2    methylPREDNISolone (MEDROL DOSEPACK) 4 mg tablet, Take as directed on package, Disp: 1 each, Rfl: 0    omeprazole (PRILOSEC) 40 MG capsule, Take 1 capsule (40 mg total) by mouth once daily., Disp: 30 capsule, Rfl: 2    ondansetron (ZOFRAN) 8 MG tablet, TAKE 1 TABLET BY MOUTH EVERY DAY, Disp: 30 tablet, Rfl: 0    ondansetron (ZOFRAN) 8 MG tablet, Take 1 tablet (8 mg total) by mouth 2 (two) times daily., Disp: 30 tablet, Rfl: 2    ondansetron (ZOFRAN-ODT) 4 MG TbDL, Take 1 tablet (4 mg total) by mouth every 8 (eight) hours as needed (nausea)., Disp: 12 tablet, Rfl: 0    ondansetron (ZOFRAN-ODT) 4 MG TbDL, Take 1 tablet (4 mg total) by mouth every 8 (eight) hours as needed (nausea)., Disp: 30 tablet, Rfl: 0    oxyCODONE-acetaminophen (PERCOCET) 5-325 mg per tablet, Take 1 tablet by mouth every 6 (six) hours as needed for Pain., Disp: 20 tablet, Rfl: 0    polyethylene glycol (GLYCOLAX) 17 gram PwPk, Take 17 g by mouth once daily., Disp: 14 each, Rfl: 0    promethazine (PHENERGAN) 25 MG tablet, Take 1/2-1 pill po q 8 hr prn nausea/emesis, Disp: 60 tablet, Rfl: 1    traMADoL (ULTRAM) 50 mg tablet,  Take 50 mg by mouth daily as needed., Disp: , Rfl:     Current Facility-Administered Medications:     etonogestreL subdermal device 68 mg, 68 mg, Implant, , Bryan Payan MD, 68 mg at 08/15/23 1040    Review of patient's allergies indicates:   Allergen Reactions    Latex, natural rubber Rash    Sulfa (sulfonamide antibiotics) Hives       Family History   Problem Relation Age of Onset    Diabetes Father     Cancer Father         skin    Diabetes Maternal Grandmother     Hypertension Maternal Grandmother     Diabetes Paternal Grandfather     Amblyopia Neg Hx     Blindness Neg Hx     Cataracts Neg Hx     Glaucoma Neg Hx     Macular degeneration Neg Hx     Retinal detachment Neg Hx      Social History     Occupational History    Not on file   Tobacco Use    Smoking status: Every Day     Current packs/day: 0.50     Types: Cigarettes    Smokeless tobacco: Current   Substance and Sexual Activity    Alcohol use: Yes     Comment: occasionally    Drug use: Yes     Types: Marijuana    Sexual activity: Yes     Birth control/protection: Condom, Implant

## 2023-11-30 ENCOUNTER — CLINICAL SUPPORT (OUTPATIENT)
Dept: REHABILITATION | Facility: HOSPITAL | Age: 23
End: 2023-11-30
Attending: ORTHOPAEDIC SURGERY
Payer: MEDICAID

## 2023-11-30 DIAGNOSIS — M54.31 RIGHT SCIATIC NERVE PAIN: ICD-10-CM

## 2023-11-30 DIAGNOSIS — R26.2 DIFFICULTY WALKING: ICD-10-CM

## 2023-11-30 PROCEDURE — 97163 PT EVAL HIGH COMPLEX 45 MIN: CPT

## 2023-11-30 PROCEDURE — 97110 THERAPEUTIC EXERCISES: CPT

## 2023-12-01 NOTE — PLAN OF CARE
[facility]   Physical Therapy Initial Evaluation      Name: Davida Parsons  Clinic Number: [id number]    Therapy Diagnosis:   Encounter Diagnoses   Name Primary?    Right sciatic nerve pain     Difficulty walking         Physician: Juan Carlos Smith,*    Physician Orders: PT Eval and Treat   Medical Diagnosis from Referral: M54.31 (ICD-10-CM) - Right sciatic nerve pain  Evaluation Date: 11/30/2023  Authorization Period Expiration: 11/16/24  Plan of Care Expiration: 4/1/24  Progress Note Due: 1/11/24  Date of Surgery: N/A  Visit # / Visits authorized: 1/ 1   FOTO: 1/ 3    Precautions: Standard     Time In: 11:20 AM   Time Out: 12:20 PM   Total Billable Time: 60 minutes    Subjective     Date of onset: August 2023     History of current condition - Davida reports: she has been experiencing severe low back pain since August of 2023. She reports a history of abdominal pain which has required multiple ER visits and has undergone a prior cholecystectomy. Patient reports her current low back pain is located at the middle and right side of the lumbar spine. She reports her pain is constant, sharp, and stabbing. Patient is no longer working due to severe pain. Current aggravators of her low back pain include walking >1 mile, getting out of bed, and getting dressed. Eases include the use of Flexeril. Davida would like to attend physical therapy to reduce her pain so she may return to working.     Falls: None    Imaging:     X-Ray     Narrative & Impression  EXAMINATION:  XR HIP WITH PELVIS WHEN PERFORMED, 2 OR 3  VIEWS RIGHT     CLINICAL HISTORY:  Pain in right hip     TECHNIQUE:  AP view of the pelvis and frog leg lateral view of the right hip were performed.     COMPARISON:  None     FINDINGS:  A fracture of the right hip is not seen.  The femur and acetabulum are well maintained.  The bones of the pelvis are intact as is the left hip.  The sacroiliac joints are sharp and symmetric.      Impression:     Negative radiographs of the right hip and pelvis.    CT scan:     EXAMINATION:  CT LUMBAR SPINE WITH CONTRAST     CLINICAL HISTORY:  Lumbar radiculopathy, symptoms persist with conservative treatment;  Radiculopathy, lumbar region     TECHNIQUE:  Low-dose axial, sagittal and coronal reformations are obtained through the lumbar spine.  Contrast was not administered.     COMPARISON:  None.     FINDINGS:  Alignment: Normal.     Vertebrae: No fracture. No lytic or blastic lesion.     Discs: Normal height.     Sacroiliac joints: Normal.     Degenerative findings:     T12-L1: No spinal canal stenosis or neural foraminal narrowing.     L1-L2: No spinal canal stenosis or neural foraminal narrowing.     L2-L3: No spinal canal stenosis or neural foraminal narrowing.     L3-L4: No spinal canal stenosis or neural foraminal narrowing.     L4-L5: No spinal canal stenosis or neural foraminal narrowing.     L5-S1: No spinal canal stenosis or neural foraminal narrowing.     Paraspinal muscles & soft tissues: Unremarkable.     Impression:     Unremarkable examination.      Prior Therapy: Patient has not received physical therapy for this condition before.   Social History: Patient lives with her fiance and 2 dogs without stairs to enter her property.   Occupation: Not working   Prior Level of Function: Independent   Current Level of Function: Unable to work, difficulty walking    Pain:  Current 0/10, worst 10/10, best 7/10   Location: Right low back pain     Description: Sharp, Stabbing   Aggravating Factors: Walking > 1 mile, getting out of bed, getting dressed   Easing Factors: Flexeril     Patients goals: reduce her pain so she may return to work.      Medical History:   Past Medical History:   Diagnosis Date    Digestive disorder     gerd    Mental disorder     anxiety and depression    Ovarian cyst     Thyroid disease     Trauma     patient states she has both desceased parents and suffers from depression and  anxiety       Surgical History:   Davida Parsons  has a past surgical history that includes Tonsillectomy; Laparoscopic cholecystectomy (N/A, 7/4/2021); and Esophagogastroduodenoscopy (N/A, 7/13/2023).    Medications:   Davida has a current medication list which includes the following prescription(s): albuterol, cyclobenzaprine, diazepam, diclofenac, dicyclomine, estradiol, levothyroxine, methylprednisolone, omeprazole, ondansetron, ondansetron, ondansetron, ondansetron, oxycodone-acetaminophen, polyethylene glycol, promethazine, and tramadol, and the following Facility-Administered Medications: etonogestrel.    Allergies:   Review of patient's allergies indicates:   Allergen Reactions    Latex, natural rubber Rash    Sulfa (sulfonamide antibiotics) Hives        Objective      Posture:  Patient presents in a flexed posture.       Functional Movements:   Gait: Slow, cautious gait       Standing Thoracolumbar Range of Motion:    % Observation Pain   Flexion 50 Pain upon returning from flexed position    Extension 25 Limited mobility    Right Rotation 25  Yes   Left Rotation 50         Hip Passive Range of Motion:    Right Left   Flexion 95 110   Extension NT NT   Internal Rotation 40 35   External Rotation 25 35         Lower Extremity Strength:   Right  Left    Hip Flexion:  3/5 5/5   Quads:  3+/5 5/5   Hamstrings:  3+/5 5/5   Ankle DF:  4/5 5/5   1st toe extension:  4/5 5/5   Hip Abduction:  3/5 4/5     Reflexes:   Patella: L: 2+, R: 1+   Achilles: L: 2+, R: 2+   Babinski: - bilateral   Clonus: - bilateral     Neural Tension Testing:   Slump:+ bilateral   SLR: + bilateral       Palpation: Patient reports pain with palpation at the L2-L5 spinous process, R PSIS, and R QL.         Intake Outcome Measure for FOTO  Survey    Therapist reviewed FOTO scores for Davida Parsons on 11/30/2023.   FOTO report - see Media section or FOTO account episode details.    Intake Score: See media section          Treatment     Total  Treatment time (time-based codes) separate from Evaluation: 29 minutes     Davida received the treatments listed below:      therapeutic exercises to develop strength, endurance, ROM, flexibility, posture, and core stabilization for 24 minutes including:    Sciatic nerve glides 2 x 20   Posterior Pelvic Tilts x 20   Dead Bugs Phase I 2 x 10 each leg       manual therapy techniques: Joint mobilizations, Manual traction, Myofacial release, Soft tissue Mobilization, and Friction Massage were applied to the: lumbar spine for 05 minutes, including:    Supine manual traction x 5'       neuromuscular re-education activities to improve: Balance, Coordination, Kinesthetic, Sense, Proprioception, and Posture for 00 minutes. The following activities were included:      therapeutic activities to improve functional performance for 00 minutes, including:      gait training to improve functional mobility and safety for 00 minutes, including:        Patient Education and Home Exercises     Education provided:   - Patient was educated upon th performance of her HEP, activity modification, and prognosis.     Written Home Exercises Provided: yes. Exercises were reviewed and Davida was able to demonstrate them prior to the end of the session.  Davida demonstrated good  understanding of the education provided. See EMR under Patient Instructions for exercises provided during therapy sessions.    Assessment     Davida is a 23 y.o. female referred to outpatient Physical Therapy with a medical diagnosis of right sciatic nerve pain. Patient presents with limited lumbar spine range of motion, reduced right hip range of motion, decreased strength of the right lower extremity, and radiating pain down bilateral lower extremities. In addition, she displays limited tolerance to weight bearing activities. Davida will benefit from physical therapy to reduce her radiating pain while improving her right lower extremity strength and ability to perform  functional movements such as walking, squatting, and bending.     Patient prognosis is Excellent.   Patient will benefit from skilled outpatient Physical Therapy to address the deficits stated above and in the chart below, provide patient /family education, and to maximize patientt's level of independence.     Plan of care discussed with patient: Yes  Patient's spiritual, cultural and educational needs considered and patient is agreeable to the plan of care and goals as stated below:     Anticipated Barriers for therapy:     Medical Necessity is demonstrated by the following  History  Co-morbidities and personal factors that may impact the plan of care [] LOW: no personal factors / co-morbidities  [] MODERATE: 1-2 personal factors / co-morbidities  [x] HIGH: 3+ personal factors / co-morbidities    Moderate / High Support Documentation:   Co-morbidities affecting plan of care: Mesenteric adenitis, N/V, pelvic pain    Personal Factors:   social background     Examination  Body Structures and Functions, activity limitations and participation restrictions that may impact the plan of care [] LOW: addressing 1-2 elements  [x] MODERATE: 3+ elements  [] HIGH: 4+ elements (please support below)    Moderate / High Support Documentation: low back pain, hip pain, N/T down bilateral lower extremities      Clinical Presentation [] LOW: stable  [] MODERATE: Evolving  [x] HIGH: Unstable     Decision Making/ Complexity Score: high       Goals:  Short Term Goals: 0-6 weeks   Patient will be compliant with her HEP 75% of the prescribed amount.   Patient will report a centralization of radiating symptoms proximal to bilateral knees in 6 weeks.   Patient will improve her right hip range of motion equal to the left side in 6 weeks.   Patient will improve her right LE strength to at least 4/5 in 6 weeks.     Long Term Goals: 6-12 weeks   Patient will be compliant with her % of the prescribed amount.   Patient will report no  radiating pain down her lower extremities in 8-12 weeks.   Patient will be able to bend, squat, and walk for > 2 miles without low back pain in 10-12 weeks.   Patient will improve her FOTO score to >54 in 10-12 weeks.     Plan     Plan of care Certification: 11/30/2023 to 4/1/24.    Outpatient Physical Therapy 1 times weekly for 12 weeks to include the following interventions: Aquatic Therapy, Electrical Stimulation  , Gait Training, Manual Therapy, Moist Heat/ Ice, Neuromuscular Re-ed, Patient Education, Self Care, Therapeutic Activities, and Therapeutic Exercise.     Asif Cadet PT        Physician's Signature: _________________________________________ Date: ________________

## 2023-12-04 ENCOUNTER — CLINICAL SUPPORT (OUTPATIENT)
Dept: REHABILITATION | Facility: HOSPITAL | Age: 23
End: 2023-12-04
Attending: FAMILY MEDICINE
Payer: MEDICAID

## 2023-12-04 DIAGNOSIS — M54.31 RIGHT SCIATIC NERVE PAIN: Primary | ICD-10-CM

## 2023-12-04 DIAGNOSIS — R26.2 DIFFICULTY WALKING: ICD-10-CM

## 2023-12-04 PROCEDURE — 97110 THERAPEUTIC EXERCISES: CPT

## 2023-12-04 NOTE — PROGRESS NOTES
"  [facility]   Physical Therapy Treatment Note     Name: Davida Parsons  Clinic Number: [id number]    Therapy Diagnosis:   Encounter Diagnoses   Name Primary?    Right sciatic nerve pain Yes    Difficulty walking      Physician: Juan Carlos Smith,*    Visit Date: 12/4/2023    Physician Orders: PT Eval and Treat   Medical Diagnosis from Referral: M54.31 (ICD-10-CM) - Right sciatic nerve pain  Evaluation Date: 11/30/2023  Authorization Period Expiration: 12/31/23  Plan of Care Expiration: 4/1/24  Progress Note Due: 1/11/24  Date of Surgery: N/A  Visit # / Visits authorized: 1/ 20 + eval   FOTO: 1/ 3     Precautions: Standard      Time In: 1:35 PM   Time Out: 2:37 PM   Total Billable Time: 62 minutes      SUBJECTIVE     Pt reports: her low back symptoms were relived following pulling of the legs at the ankles last treatment session. .    She was compliant with home exercise program.  Response to previous treatment: Good  Functional change: Too early     Pain: 5-6/10  Location: [facility]       OBJECTIVE     12/4/23  Lumbar Spine ROM   Flexion: distal shin   Extension: 50% with pain   Right Side Bending: superior patella  Left Side Bending: superior patella (tight)   Right Rotation: 75%  Left Rotation: 75%    Treatment     *All units billed as there-ex per Medicaid guidelines*    Davida received the treatments listed below:      Therapeutic exercises to develop strength, endurance, ROM, flexibility, posture, and core stabilization for 40 minutes including:    Bike x 5' to promote improved cardiovascular fitness and promote movement to reduce low back pain   Supine sciatic nerve glides x 20   PPT with bridges x 20   Dead Bugs phase I 2 x 10 with 3" hold   Knee Extension 3 x 10 35#   Supine hooklying x 5 minutes       Manual therapy techniques: Joint mobilizations, Manual traction, Myofacial release, Soft tissue Mobilization, and Friction Massage were applied to the: lumbar " spine for 21 minutes, including:    P-A mobilizations of the lumbar spinous processes   Lumbar manual traction with gait belt assistance  SL neutral gapping technique     Therapeutic activities to improve functional performance for 00 minutes, including:      Neuromuscular re-education activities to improve: Balance, Coordination, Kinesthetic, Sense, Proprioception, and Posture for 00 minutes. The following activities were included:           Patient Education and Home Exercises     Home Exercises Provided and Patient Education Provided     Education provided:   - Patient was educated upon the performance of her HEP, activity modification, and prognosis     Written Home Exercises Provided: yes. Exercises were reviewed and Davida was able to demonstrate them prior to the end of the session.  Davida demonstrated good  understanding of the education provided. See EMR under Patient Instructions for exercises provided during therapy sessions    ASSESSMENT     Davida is responding well to flexion based exercises as extension tends to make her pain worse. Manual traction with the leg extended resulted in a reduction in pain following the intervention. Patient continued with postural activities to promote activation of the core musculature and normal length of the sciatic nerve.     Davida Is progressing well towards her goals.     Pt prognosis is Excellent.     Pt will continue to benefit from skilled outpatient physical therapy to address the deficits listed in the problem list box on initial evaluation, provide pt/family education and to maximize pt's level of independence in the home and community environment.     Pt's spiritual, cultural and educational needs considered and pt agreeable to plan of care and goals.     Anticipated barriers to physical therapy:     Medical Necessity is demonstrated by the following  History  Co-morbidities and personal factors that may impact the plan of care [] LOW: no personal factors /  co-morbidities  [] MODERATE: 1-2 personal factors / co-morbidities  [x] HIGH: 3+ personal factors / co-morbidities     Moderate / High Support Documentation:   Co-morbidities affecting plan of care: Mesenteric adenitis, N/V, pelvic pain     Personal Factors:   social background      Examination  Body Structures and Functions, activity limitations and participation restrictions that may impact the plan of care [] LOW: addressing 1-2 elements  [x] MODERATE: 3+ elements  [] HIGH: 4+ elements (please support below)     Moderate / High Support Documentation: low back pain, hip pain, N/T down bilateral lower extremities       Clinical Presentation [] LOW: stable  [] MODERATE: Evolving  [x] HIGH: Unstable      Decision Making/ Complexity Score: high            Goals:   Short Term Goals: 0-6 weeks   Patient will be compliant with her HEP 75% of the prescribed amount.   Patient will report a centralization of radiating symptoms proximal to bilateral knees in 6 weeks.   Patient will improve her right hip range of motion equal to the left side in 6 weeks.   Patient will improve her right LE strength to at least 4/5 in 6 weeks.      Long Term Goals: 6-12 weeks   Patient will be compliant with her % of the prescribed amount.   Patient will report no radiating pain down her lower extremities in 8-12 weeks.   Patient will be able to bend, squat, and walk for > 2 miles without low back pain in 10-12 weeks.   Patient will improve her FOTO score to >54 in 10-12 weeks    PLAN     Continue with flexion based exercises while seeking to centralize the patient's radiating symptoms.     Asif Cadet, PT, DPT

## 2023-12-26 ENCOUNTER — CLINICAL SUPPORT (OUTPATIENT)
Dept: REHABILITATION | Facility: HOSPITAL | Age: 23
End: 2023-12-26
Attending: ORTHOPAEDIC SURGERY
Payer: MEDICAID

## 2023-12-26 DIAGNOSIS — M54.31 RIGHT SCIATIC NERVE PAIN: Primary | ICD-10-CM

## 2023-12-26 DIAGNOSIS — R26.2 DIFFICULTY WALKING: ICD-10-CM

## 2023-12-26 PROCEDURE — 97110 THERAPEUTIC EXERCISES: CPT

## 2023-12-26 NOTE — PROGRESS NOTES
"  [facility]   Physical Therapy Treatment Note     Name: Davida Parsons  Clinic Number: [id number]    Therapy Diagnosis:   Encounter Diagnoses   Name Primary?    Right sciatic nerve pain Yes    Difficulty walking      Physician: Juan Carlos Smith,*    Visit Date: 12/26/2023    Physician Orders: PT Eval and Treat   Medical Diagnosis from Referral: M54.31 (ICD-10-CM) - Right sciatic nerve pain  Evaluation Date: 11/30/2023  Authorization Period Expiration: 12/29/23    Plan of Care Expiration: 4/1/24  Progress Note Due: 1/11/24  Date of Surgery: N/A  Visit # / Visits authorized: 2/ 4 + eval   FOTO: 1/ 3     Precautions: Standard      Time In: 9:00 AM   Time Out: 10:00 AM     Total Billable Time: 60 minutes      SUBJECTIVE     Pt reports: her low back has been feeling stiff, stating her radiating pain has continued down her right lower extremity. She states she has not been able to come to therapy because her car broke down.     She was compliant with home exercise program.  Response to previous treatment: Good  Functional change: Too early     Pain: NR/10  Location: [facility]       OBJECTIVE     12/4/23  Lumbar Spine ROM   Flexion: distal shin   Extension: 50% with pain   Right Side Bending: superior patella  Left Side Bending: superior patella (tight)   Right Rotation: 75%  Left Rotation: 75%    Treatment     *All units billed as there-ex per Medicaid guidelines*    Davida received the treatments listed below:      Therapeutic exercises to develop strength, endurance, ROM, flexibility, posture, and core stabilization for 45 minutes including:    Seated sciatic nerve glides 2 x 15   Bridges with PPT 2 x 10   Child's Pose Stretch 3 x 30"   Quadruped knee lifts 2 x 8   Dead bug marches 2 x 10   Leg Press SL 1.5 B 3 x 10   Bike x 7' to promote improved cardiovascular fitness and promote movement to reduce low back pain       Manual therapy techniques: Joint mobilizations, " Manual traction, Myofacial release, Soft tissue Mobilization, and Friction Massage were applied to the: lumbar spine for 15 minutes, including:    Lumbar manual traction   Lumbar spine manipulation supine/prone     Therapeutic activities to improve functional performance for 00 minutes, including:      Neuromuscular re-education activities to improve: Balance, Coordination, Kinesthetic, Sense, Proprioception, and Posture for 00 minutes. The following activities were included:           Patient Education and Home Exercises     Home Exercises Provided and Patient Education Provided     Education provided:   - Patient was educated upon the performance of her HEP, activity modification, and prognosis     Written Home Exercises Provided: yes. Exercises were reviewed and Davida was able to demonstrate them prior to the end of the session.  Davida demonstrated good  understanding of the education provided. See EMR under Patient Instructions for exercises provided during therapy sessions    ASSESSMENT     Lumbar spine manipulation was introduced as manual lumbar traction continued. Due to chronic low back pain (>1 year), patient was educated on importance of daily exercise. Flexion based exercises included dead bug marches and child's pose stretch. Right lower extremity weakness persists compared to the left lower extremity.      Davida Is progressing well towards her goals.     Pt prognosis is Excellent.     Pt will continue to benefit from skilled outpatient physical therapy to address the deficits listed in the problem list box on initial evaluation, provide pt/family education and to maximize pt's level of independence in the home and community environment.     Pt's spiritual, cultural and educational needs considered and pt agreeable to plan of care and goals.     Anticipated barriers to physical therapy: Transportation      Goals:   Short Term Goals: 0-6 weeks   Patient will be compliant with her HEP 75% of the  prescribed amount.   Patient will report a centralization of radiating symptoms proximal to bilateral knees in 6 weeks.   Patient will improve her right hip range of motion equal to the left side in 6 weeks.   Patient will improve her right LE strength to at least 4/5 in 6 weeks.      Long Term Goals: 6-12 weeks   Patient will be compliant with her % of the prescribed amount.   Patient will report no radiating pain down her lower extremities in 8-12 weeks.   Patient will be able to bend, squat, and walk for > 2 miles without low back pain in 10-12 weeks.   Patient will improve her FOTO score to >54 in 10-12 weeks    PLAN     Continue with flexion based exercises while seeking to centralize the patient's radiating symptoms.     Asif Cadet, PT, DPT

## 2024-01-02 ENCOUNTER — CLINICAL SUPPORT (OUTPATIENT)
Dept: REHABILITATION | Facility: HOSPITAL | Age: 24
End: 2024-01-02
Attending: FAMILY MEDICINE
Payer: MEDICAID

## 2024-01-02 DIAGNOSIS — R26.2 DIFFICULTY WALKING: ICD-10-CM

## 2024-01-02 DIAGNOSIS — M54.31 RIGHT SCIATIC NERVE PAIN: Primary | ICD-10-CM

## 2024-01-02 PROCEDURE — 97110 THERAPEUTIC EXERCISES: CPT

## 2024-01-02 NOTE — PROGRESS NOTES
"  [facility]   Physical Therapy Treatment Note     Name: Davida Parsons  Clinic Number: [id number]    Therapy Diagnosis:   Encounter Diagnoses   Name Primary?    Right sciatic nerve pain Yes    Difficulty walking      Physician: Juan Carlos Smith,*    Visit Date: 1/2/2024    Physician Orders: PT Eval and Treat   Medical Diagnosis from Referral: M54.31 (ICD-10-CM) - Right sciatic nerve pain  Evaluation Date: 11/30/2023  Authorization Period Expiration: 12/29/23    Plan of Care Expiration: 4/1/24  Progress Note Due: 1/11/24  Date of Surgery: N/A  Visit # / Visits authorized: 3/24 + eval   FOTO: 1/ 3     Precautions: Standard      Time In: 11:10 AM   Time Out: 12:05 PM     Total Billable Time: 30 minutes      SUBJECTIVE     Pt reports: lumbar traction has been improving her low back pain. She states numbness/tingling still radiates down to her dorsal right foot.       She was compliant with home exercise program.  Response to previous treatment: Good  Functional change: Too early     Pain: 6/10  [facility]: [facility]       OBJECTIVE     12/4/23  Lumbar Spine ROM   Flexion: distal shin   Extension: 50% with pain   Right Side Bending: superior patella  Left Side Bending: superior patella (tight)   Right Rotation: 75%  Left Rotation: 75%    Treatment     *All units billed as there-ex per Medicaid guidelines*    Davida received the treatments listed below:      Therapeutic exercises to develop strength, endurance, ROM, flexibility, posture, and core stabilization for 41 minutes including:    Seated sciatic nerve glides 2 x 25 with 1" hold   Bridges with PPT 2 x 10   Child's Pose Stretch 3 x 30"   Cat/cow x 30   Quadruped knee lifts 2 x 10   Leg Press SL 1.5 B 3 x 10   Bike x 5' at level 5 to promote improved cardiovascular fitness and promote movement to reduce low back pain     NP  Dead bug marches 2 x 10    Manual therapy techniques: Joint mobilizations, Manual traction, " Myofacial release, Soft tissue Mobilization, and Friction Massage were applied to the: lumbar spine for 14 minutes, including:    Lumbar manual traction   Lumbar spine manipulation supine/prone     Therapeutic activities to improve functional performance for 00 minutes, including:      Neuromuscular re-education activities to improve: Balance, Coordination, Kinesthetic, Sense, Proprioception, and Posture for 00 minutes. The following activities were included:           Patient Education and Home Exercises     Home Exercises Provided and Patient Education Provided     Education provided:   - Patient was educated upon the performance of her HEP, activity modification, and prognosis     Written Home Exercises Provided: yes. Exercises were reviewed and Davida was able to demonstrate them prior to the end of the session.  Davida demonstrated good  understanding of the education provided. See EMR under Patient Instructions for exercises provided during therapy sessions    ASSESSMENT     Davida continued with flexion based exercises to relieve her radiating symptoms down her right lower extremity. She continues to report a reduction in symptoms following  lumbar manual traction. Right lower extremity weakness remains due to pain and radiating symptoms. Introduce patient to knee extension machine next visit as she will continue with home exercises and a walking program.      Davida Is progressing well towards her goals.     Pt prognosis is Excellent.     Pt will continue to benefit from skilled outpatient physical therapy to address the deficits listed in the problem list box on initial evaluation, provide pt/family education and to maximize pt's level of independence in the home and community environment.     Pt's spiritual, cultural and educational needs considered and pt agreeable to plan of care and goals.     Anticipated barriers to physical therapy: Transportation      Goals:   Short Term Goals: 0-6 weeks   Patient  will be compliant with her HEP 75% of the prescribed amount.   Patient will report a centralization of radiating symptoms proximal to bilateral knees in 6 weeks.   Patient will improve her right hip range of motion equal to the left side in 6 weeks.   Patient will improve her right LE strength to at least 4/5 in 6 weeks.      Long Term Goals: 6-12 weeks   Patient will be compliant with her % of the prescribed amount.   Patient will report no radiating pain down her lower extremities in 8-12 weeks.   Patient will be able to bend, squat, and walk for > 2 miles without low back pain in 10-12 weeks.   Patient will improve her FOTO score to >54 in 10-12 weeks    PLAN     Continue with flexion based exercises while seeking to centralize the patient's radiating symptoms.     Asif Cadet, PT, DPT

## 2024-01-09 ENCOUNTER — CLINICAL SUPPORT (OUTPATIENT)
Dept: REHABILITATION | Facility: HOSPITAL | Age: 24
End: 2024-01-09
Attending: ORTHOPAEDIC SURGERY
Payer: MEDICAID

## 2024-01-09 DIAGNOSIS — R26.2 DIFFICULTY WALKING: ICD-10-CM

## 2024-01-09 DIAGNOSIS — M54.31 RIGHT SCIATIC NERVE PAIN: Primary | ICD-10-CM

## 2024-01-09 PROCEDURE — 97110 THERAPEUTIC EXERCISES: CPT

## 2024-01-09 NOTE — PROGRESS NOTES
"  [facility]   Physical Therapy Treatment Note     Name: Davida Parsons  Clinic Number: [id number]    Therapy Diagnosis:   Encounter Diagnoses   Name Primary?    Right sciatic nerve pain Yes    Difficulty walking      Physician: Juan Carlos Smith,*     Visit Date: 1/9/2024    Physician Orders: PT Eval and Treat   Medical Diagnosis from Referral: M54.31 (ICD-10-CM) - Right sciatic nerve pain  Evaluation Date: 11/30/2023  Authorization Period Expiration: 12/29/23    Plan of Care Expiration: 4/1/24  Progress Note Due: 2/11/24  Date of Surgery: N/A  Visit # / Visits authorized: 4/24 + eval   FOTO: 1/ 3     Precautions: Standard      Time In: 9:05 AM   Time Out: 10:00 AM     Total Billable Time: 55 minutes      SUBJECTIVE     Pt reports: she has taught her fiance how to do manual lumbar traction which relieves her right lower extremity leg pain. She reports her right lower extremity feels heavy and weak.     She was compliant with home exercise program.  Response to previous treatment: Good  Functional change: Too early     Pain: 7/10  [facility]       OBJECTIVE     1/8/23  Lumbar Spine ROM   Flexion: Finger tips to toes    Extension: 50% (prefers extension)   Right Side Bending: superior patella  Left Side Bending: mid patella (mild pain)   Right Rotation: 75%  Left Rotation: 75%    Hip ROM       R   L   Flexion:    107   104  Extension:   15   15  Internal Rotation:   35   34  External Rotation:   39   40        Treatment     *All units billed as there-ex per Medicaid guidelines*    Davida received the treatments listed below:      Therapeutic exercises to develop strength, endurance, ROM, flexibility, posture, and core stabilization for 32 minutes including:    Bike x 5' at level 5 to promote improved cardiovascular fitness and promote movement to reduce low back pain   Seated sciatic nerve glides 2 x 20 with 1" hold   Bridges with PPT 2 x 12   Child's Pose " "Stretch 3 x 30"   Knee extension SL 4 x 8 15#    NP  Dead bug marches 2 x 10  Cat/cow x 30   Quadruped knee lifts 2 x 10   Leg Press SL 1.5 B 3 x 10     Manual therapy techniques: Joint mobilizations, Manual traction, Myofacial release, Soft tissue Mobilization, and Friction Massage were applied to the: lumbar spine for 23 minutes, including:    Lumbar manual traction   Hip PROM, hip ER stretch   Hip/Lumbar spine ROM re-assessment     Therapeutic activities to improve functional performance for 00 minutes, including:      Neuromuscular re-education activities to improve: Balance, Coordination, Kinesthetic, Sense, Proprioception, and Posture for 00 minutes. The following activities were included:           Patient Education and Home Exercises     Home Exercises Provided and Patient Education Provided     Education provided:   - Patient was educated upon the performance of her HEP, activity modification, and prognosis     Written Home Exercises Provided: yes. Exercises were reviewed and Davida was able to demonstrate them prior to the end of the session.  Davida demonstrated good  understanding of the education provided. See EMR under Patient Instructions for exercises provided during therapy sessions    ASSESSMENT     Davida presents with improved hip range of motion compared to the initial evaluation. Mild pain persists with left side bending. Patient prefers extension over flexion as she may benefit from prone on elbows during next treatment session. Right lower extremity strength remains limited due to pain and radiating symptoms. Patient was educated upon importance of a walking program as she will continue with her home exercises.      Davida Is progressing well towards her goals.     Pt prognosis is Excellent.     Pt will continue to benefit from skilled outpatient physical therapy to address the deficits listed in the problem list box on initial evaluation, provide pt/family education and to maximize pt's " level of independence in the home and community environment.     Pt's spiritual, cultural and educational needs considered and pt agreeable to plan of care and goals.     Anticipated barriers to physical therapy: Transportation      Goals:   Short Term Goals: 0-6 weeks   Patient will be compliant with her HEP 75% of the prescribed amount. MET   Patient will report a centralization of radiating symptoms proximal to bilateral knees in 6 weeks. NOT MET  Patient will improve her right hip range of motion equal to the left side in 6 weeks. MET  Patient will improve her right LE strength to at least 4/5 in 6 weeks. NOT MET     Long Term Goals: 6-12 weeks   Patient will be compliant with her % of the prescribed amount.   Patient will report no radiating pain down her lower extremities in 8-12 weeks.   Patient will be able to bend, squat, and walk for > 2 miles without low back pain in 10-12 weeks.   Patient will improve her FOTO score to >54 in 10-12 weeks    PLAN     Centralize right lower extremity pain through nerve glides, traction, and possible lumbar manipulation.     Asif Cadet, PT, DPT

## 2024-04-15 ENCOUNTER — PATIENT MESSAGE (OUTPATIENT)
Dept: GASTROENTEROLOGY | Facility: CLINIC | Age: 24
End: 2024-04-15
Payer: MEDICAID

## 2025-05-18 ENCOUNTER — OFFICE VISIT (OUTPATIENT)
Dept: URGENT CARE | Facility: CLINIC | Age: 25
End: 2025-05-18
Payer: MEDICAID

## 2025-05-18 VITALS
TEMPERATURE: 99 F | BODY MASS INDEX: 42.68 KG/M2 | OXYGEN SATURATION: 96 % | DIASTOLIC BLOOD PRESSURE: 85 MMHG | SYSTOLIC BLOOD PRESSURE: 120 MMHG | RESPIRATION RATE: 20 BRPM | HEART RATE: 118 BPM | HEIGHT: 64 IN | WEIGHT: 250 LBS

## 2025-05-18 DIAGNOSIS — K52.9 GASTROENTERITIS: ICD-10-CM

## 2025-05-18 DIAGNOSIS — R11.2 NAUSEA AND VOMITING, UNSPECIFIED VOMITING TYPE: Primary | ICD-10-CM

## 2025-05-18 PROCEDURE — 99213 OFFICE O/P EST LOW 20 MIN: CPT | Mod: S$GLB,,, | Performed by: NURSE PRACTITIONER

## 2025-05-18 RX ORDER — PROMETHAZINE HYDROCHLORIDE 25 MG/1
25 TABLET ORAL EVERY 6 HOURS PRN
Qty: 20 TABLET | Refills: 0 | Status: SHIPPED | OUTPATIENT
Start: 2025-05-18

## 2025-05-18 RX ORDER — ONDANSETRON 4 MG/1
4 TABLET, ORALLY DISINTEGRATING ORAL EVERY 8 HOURS PRN
Qty: 20 TABLET | Refills: 0 | Status: SHIPPED | OUTPATIENT
Start: 2025-05-18

## 2025-05-18 NOTE — PROGRESS NOTES
"Subjective:      Patient ID: Davida Parsons is a 25 y.o. female.    Vitals:  height is 5' 4" (1.626 m) and weight is 113.4 kg (250 lb). Her oral temperature is 99.2 °F (37.3 °C). Her blood pressure is 120/85 and her pulse is 118 (abnormal). Her respiration is 20 and oxygen saturation is 96%.     Chief Complaint: Emesis    Pt present with symptoms of- Nausea and Vomiting X 1 week. Pt states she was at the ED department yesterday ( abdominal pain and vomiting) and they forgot to call in Zofran for her symptoms. Her PCP has prescribed zofran 8mg ODT for her in the past but her insurance will not cover it. She would like the 4mg ODT    Emesis   This is a new problem. The current episode started in the past 7 days. The problem has been gradually worsening. The emesis has an appearance of bile. Associated symptoms include diarrhea. Pertinent negatives include no headaches. Treatments tried: Rx- Meds Given at the ED. The treatment provided mild relief.       Gastrointestinal:  Positive for vomiting and diarrhea.   Neurological:  Negative for headaches.      Objective:     Physical Exam   Constitutional: She is oriented to person, place, and time. She appears well-developed.   HENT:   Head: Normocephalic and atraumatic.   Ears:   Right Ear: External ear normal.   Left Ear: External ear normal.   Nose: Nose normal.   Mouth/Throat: Mucous membranes are normal.   Eyes: Conjunctivae and lids are normal.   Neck: Trachea normal. Neck supple.   Cardiovascular: Normal rate, regular rhythm and normal heart sounds.   Pulmonary/Chest: Effort normal and breath sounds normal. No respiratory distress.   Abdominal: Normal appearance and bowel sounds are normal. She exhibits no distension and no mass. Soft. There is no abdominal tenderness. There is no rebound, no guarding, no left CVA tenderness and no right CVA tenderness. No hernia.   Musculoskeletal: Normal range of motion.         General: Normal range of motion.   Neurological: She is " alert and oriented to person, place, and time. She has normal strength.   Skin: Skin is warm, dry, intact, not diaphoretic and not pale.   Psychiatric: Her speech is normal and behavior is normal. Judgment and thought content normal.   Nursing note and vitals reviewed.      Assessment:     1. Nausea and vomiting, unspecified vomiting type    2. Gastroenteritis        Plan:       Nausea and vomiting, unspecified vomiting type  -     ondansetron (ZOFRAN-ODT) 4 MG TbDL; Take 1 tablet (4 mg total) by mouth every 8 (eight) hours as needed (nausea).  Dispense: 20 tablet; Refill: 0  -     promethazine (PHENERGAN) 25 MG tablet; Take 1 tablet (25 mg total) by mouth every 6 (six) hours as needed for Nausea.  Dispense: 20 tablet; Refill: 0    Gastroenteritis    Reviewed notes from ED visit yesterday. Will send her zofran 4mg ODT and phenergan 25mg. She will take phenergan when she is not working.    Patient Instructions   Zofran- every 8 hours as needed for nausea  Phenergan- every 6 hours as needed for nausea. May cause drowsiness    Recommend BRAT/[place] diet. A bland diet is made up of foods which are soft, not spicy, cooked, low in fat, and low in fiber. These foods are not likely to upset the GI tract.  BRAT Diet = Bananas, Rice, Apples, and Toast.    Please remember that you have received care at an urgent care today. Urgent cares are not emergency rooms and are not equipped to handle life threatening emergencies and cannot rule in or out certain medical conditions and you may be released before all of your medical problems are known or treated. Please arrange follow up with your primary care physician or speciality clinic  within 2-5 days if your signs and symptoms have not resolved or worsen. Patient can call our Referral Hotline at [phone] to make an appointment.  You feel very weak, like you cant stand up, and your skin is cool, clammy, or looks blue or gray.  You have severe abdominal pain.  You have chest  pain or trouble breathing.  You have signs of severe fluid loss, such as:  No urine for more than 8 hours.  You feel very lightheaded or like you are going to pass out.  You feel weak like you are going to fall.  You are not able to keep any fluids down.  You develop early signs of fluid loss again, such as:  Your urine is very dark colored.  Your mouth is dry.  You have muscle cramps.  You have a lack of energy.  You feel light-headed when you get up.

## 2025-05-18 NOTE — PATIENT INSTRUCTIONS
Zofran- every 8 hours as needed for nausea  Phenergan- every 6 hours as needed for nausea. May cause drowsiness    Recommend BRAT/[place] diet. A bland diet is made up of foods which are soft, not spicy, cooked, low in fat, and low in fiber. These foods are not likely to upset the GI tract.  BRAT Diet = Bananas, Rice, Apples, and Toast.    Please remember that you have received care at an urgent care today. Urgent cares are not emergency rooms and are not equipped to handle life threatening emergencies and cannot rule in or out certain medical conditions and you may be released before all of your medical problems are known or treated. Please arrange follow up with your primary care physician or speciality clinic  within 2-5 days if your signs and symptoms have not resolved or worsen. Patient can call our Referral Hotline at [phone] to make an appointment.  You feel very weak, like you cant stand up, and your skin is cool, clammy, or looks blue or gray.  You have severe abdominal pain.  You have chest pain or trouble breathing.  You have signs of severe fluid loss, such as:  No urine for more than 8 hours.  You feel very lightheaded or like you are going to pass out.  You feel weak like you are going to fall.  You are not able to keep any fluids down.  You develop early signs of fluid loss again, such as:  Your urine is very dark colored.  Your mouth is dry.  You have muscle cramps.  You have a lack of energy.  You feel light-headed when you get up.

## 2025-05-19 ENCOUNTER — OFFICE VISIT (OUTPATIENT)
Dept: OBSTETRICS AND GYNECOLOGY | Facility: CLINIC | Age: 25
End: 2025-05-19
Payer: MEDICAID

## 2025-05-19 VITALS
SYSTOLIC BLOOD PRESSURE: 120 MMHG | DIASTOLIC BLOOD PRESSURE: 80 MMHG | WEIGHT: 251.75 LBS | BODY MASS INDEX: 43.22 KG/M2

## 2025-05-19 DIAGNOSIS — N83.202 LEFT OVARIAN CYST: ICD-10-CM

## 2025-05-19 DIAGNOSIS — R10.2 PELVIC PAIN: Primary | ICD-10-CM

## 2025-05-19 DIAGNOSIS — Z30.09 ENCOUNTER FOR GENERAL COUNSELING AND ADVICE ON CONTRACEPTIVE MANAGEMENT: ICD-10-CM

## 2025-05-19 PROCEDURE — 99213 OFFICE O/P EST LOW 20 MIN: CPT | Mod: PBBFAC | Performed by: OBSTETRICS & GYNECOLOGY

## 2025-05-19 PROCEDURE — 99999 PR PBB SHADOW E&M-EST. PATIENT-LVL III: CPT | Mod: PBBFAC,,, | Performed by: OBSTETRICS & GYNECOLOGY

## 2025-05-19 PROCEDURE — 1159F MED LIST DOCD IN RCRD: CPT | Mod: CPTII,,, | Performed by: OBSTETRICS & GYNECOLOGY

## 2025-05-19 PROCEDURE — 3079F DIAST BP 80-89 MM HG: CPT | Mod: CPTII,,, | Performed by: OBSTETRICS & GYNECOLOGY

## 2025-05-19 PROCEDURE — 3008F BODY MASS INDEX DOCD: CPT | Mod: CPTII,,, | Performed by: OBSTETRICS & GYNECOLOGY

## 2025-05-19 PROCEDURE — 3074F SYST BP LT 130 MM HG: CPT | Mod: CPTII,,, | Performed by: OBSTETRICS & GYNECOLOGY

## 2025-05-19 PROCEDURE — 99212 OFFICE O/P EST SF 10 MIN: CPT | Mod: S$PBB,,, | Performed by: OBSTETRICS & GYNECOLOGY

## 2025-05-19 NOTE — PROGRESS NOTES
Subjective     Patient ID: Davida Parsons is a 25 y.o. female.    Chief Complaint:  Pelvic Pain      History of Present Illness  HPI  Has long standing history of pelvic pain.  Went to ED and CT scan showed left ovarian cyst.    GYN & OB History  No LMP recorded. Patient has had an implant.   Date of Last Pap: No result found    OB History   No obstetric history on file.       Review of Systems  Review of Systems   Constitutional: Negative.    HENT: Negative.     Eyes: Negative.    Respiratory: Negative.     Cardiovascular: Negative.    Gastrointestinal: Negative.    Endocrine: Negative.    Genitourinary:  Positive for pelvic pain.   Musculoskeletal: Negative.    Integumentary:  Negative.   Neurological: Negative.    Hematological: Negative.    Psychiatric/Behavioral: Negative.     Breast: negative.           Objective   Physical Exam:   Constitutional: She is oriented to person, place, and time. She appears well-developed.    HENT:   Head: Normocephalic and atraumatic.    Eyes: EOM are normal.     Cardiovascular:  Normal rate.             Pulmonary/Chest: Effort normal.        Abdominal: Soft. There is no abdominal tenderness.             Musculoskeletal: Normal range of motion.       Neurological: She is oriented to person, place, and time.    Skin: Skin is warm.    Psychiatric: She has a normal mood and affect.            Assessment and Plan     1. Pelvic pain    2. Encounter for general counseling and advice on contraceptive management             Plan:  1. Pelvic pain (Primary)  - Pelvic ultrasound ordered.  Will call patient with results.  - Patient instructed to call office if she has not heard anything 2 wks after ultrasound.  - US Pelvis Comp with Transvag NON-OB (xpd; Future    2. Encounter for general counseling and advice on contraceptive management  - Nexplanon ordered.  - Device Authorization Order

## 2025-05-21 ENCOUNTER — PROCEDURE VISIT (OUTPATIENT)
Dept: OBSTETRICS AND GYNECOLOGY | Facility: CLINIC | Age: 25
End: 2025-05-21
Payer: MEDICAID

## 2025-05-21 ENCOUNTER — PATIENT MESSAGE (OUTPATIENT)
Dept: OBSTETRICS AND GYNECOLOGY | Facility: CLINIC | Age: 25
End: 2025-05-21

## 2025-05-21 VITALS — SYSTOLIC BLOOD PRESSURE: 160 MMHG | WEIGHT: 251.13 LBS | BODY MASS INDEX: 43.1 KG/M2 | DIASTOLIC BLOOD PRESSURE: 70 MMHG

## 2025-05-21 DIAGNOSIS — Z30.46 ENCOUNTER FOR REMOVAL AND REINSERTION OF NEXPLANON: Primary | ICD-10-CM

## 2025-05-21 PROCEDURE — 11983 REMOVE/INSERT DRUG IMPLANT: CPT | Mod: PBBFAC | Performed by: OBSTETRICS & GYNECOLOGY

## 2025-05-21 PROCEDURE — 99999PBSHW PR PBB SHADOW TECHNICAL ONLY FILED TO HB: Mod: PBBFAC,,,

## 2025-05-21 RX ADMIN — ETONOGESTREL 68 MG: 68 IMPLANT SUBCUTANEOUS at 01:05

## 2025-05-21 NOTE — PROCEDURES
Removal and Insertion of Nexplanon    Date/Time: 5/21/2025 1:30 PM    Performed by: Michelle Ward MD  Authorized by: Michelle Ward MD    Consent:   Consent obtained:  Prior to procedure the appropriate consent was completed and verified  Consent given by:  Patient  Procedure risks and benefits discussed: yes    Patient questions answered: yes    Patient agrees, verbalizes understanding, and wants to proceed: yes    Educational handouts given: yes    Instructions and paperwork completed: yes    Pre-Procedure:   Pre-procedure timeout performed: no    Prepped with: chlorhexidine gluconate    Local anesthetic:  Lidocaine 1%  The site was cleaned and prepped in a sterile fashion: yes    Removal due to mechanical complications: no    Removal due to infection and inflammatory reaction: no    Removal due to expiration: yes    Removal Procedure:   Arm: left arm  Palpation confirms location: yes  Small stab incision was made in arm: yes  Implant grasped by: hemostat  Upon removal device was intact: yesno  Removed with no complications: yes    Insertion Procedure:   Small stab incision was made in arm: no    Left/right:  left arm   68 mg etonogestreL 68 mg  Preloaded Implanon trocar was placed subdermally: yes    Visualization of implant was obtained: yes    Nexplanon was inserted and trocar removed: yes    Visualization of notch in stilette and palpitation of device: yes    Palpation confirms placement by provider and patient: yes    Site was closed with steri-strips and pressure bandage applied: yes

## (undated) DEVICE — IRRIGATOR ENDOSCOPY DISP.

## (undated) DEVICE — SOL NS 1000CC

## (undated) DEVICE — DRAPE STERI INSTRUMENT 1018

## (undated) DEVICE — BAG TISS RETRV MONARCH 10MM

## (undated) DEVICE — BLADE SURG CARBON STEEL SZ11

## (undated) DEVICE — TROCAR ENDOPATH XCEL 12X100MM

## (undated) DEVICE — TRAY MINOR GEN SURG

## (undated) DEVICE — NDL HYPO REG 25G X 1 1/2

## (undated) DEVICE — TROCAR ENDOPATH XCEL 5MM 7.5CM

## (undated) DEVICE — SUT MCRYL PLUS 4-0 PS2 27IN

## (undated) DEVICE — NDL 18GA X1 1/2 REG BEVEL

## (undated) DEVICE — APPLIER CLIP ENDO LIGAMAX 5MM

## (undated) DEVICE — SCISSOR 5MMX35CM DIRECT DRIVE

## (undated) DEVICE — ELECTRODE REM PLYHSV RETURN 9

## (undated) DEVICE — ADHESIVE DERMABOND ADVANCED

## (undated) DEVICE — TUBING HF INSUFFLATION W/ FLTR

## (undated) DEVICE — TROCAR ENDOPATH XCEL 5X75MM

## (undated) DEVICE — DRAPE ABDOMINAL TIBURON 14X11

## (undated) DEVICE — SEE MEDLINE ITEM 152622

## (undated) DEVICE — DRAPE CORETEMP FLD WRM 56X62IN

## (undated) DEVICE — KIT ANTIFOG